# Patient Record
Sex: FEMALE | Race: WHITE | Employment: OTHER | URBAN - METROPOLITAN AREA
[De-identification: names, ages, dates, MRNs, and addresses within clinical notes are randomized per-mention and may not be internally consistent; named-entity substitution may affect disease eponyms.]

---

## 2019-02-25 ENCOUNTER — TRANSFERRED RECORDS (OUTPATIENT)
Dept: HEALTH INFORMATION MANAGEMENT | Facility: CLINIC | Age: 84
End: 2019-02-25

## 2019-03-06 ENCOUNTER — TRANSFERRED RECORDS (OUTPATIENT)
Dept: HEALTH INFORMATION MANAGEMENT | Facility: CLINIC | Age: 84
End: 2019-03-06

## 2019-08-23 ENCOUNTER — OFFICE VISIT (OUTPATIENT)
Dept: FAMILY MEDICINE | Facility: CLINIC | Age: 84
End: 2019-08-23
Payer: COMMERCIAL

## 2019-08-23 VITALS
TEMPERATURE: 98 F | DIASTOLIC BLOOD PRESSURE: 68 MMHG | RESPIRATION RATE: 16 BRPM | SYSTOLIC BLOOD PRESSURE: 106 MMHG | HEART RATE: 90 BPM | WEIGHT: 96 LBS | BODY MASS INDEX: 17.66 KG/M2 | HEIGHT: 62 IN

## 2019-08-23 DIAGNOSIS — I49.9 IRREGULAR HEART RATE: ICD-10-CM

## 2019-08-23 DIAGNOSIS — R94.31 ABNORMAL ELECTROCARDIOGRAM: ICD-10-CM

## 2019-08-23 DIAGNOSIS — E03.9 HYPOTHYROIDISM, UNSPECIFIED TYPE: Primary | ICD-10-CM

## 2019-08-23 DIAGNOSIS — E55.9 VITAMIN D DEFICIENCY: ICD-10-CM

## 2019-08-23 DIAGNOSIS — E44.0 MODERATE PROTEIN-CALORIE MALNUTRITION (H): ICD-10-CM

## 2019-08-23 PROBLEM — E46 PROTEIN-CALORIE MALNUTRITION (H): Status: ACTIVE | Noted: 2019-08-23

## 2019-08-23 LAB
ALBUMIN SERPL-MCNC: 3.6 G/DL (ref 3.4–5)
ALP SERPL-CCNC: 69 U/L (ref 40–150)
ALT SERPL W P-5'-P-CCNC: 37 U/L (ref 0–50)
ANION GAP SERPL CALCULATED.3IONS-SCNC: 4 MMOL/L (ref 3–14)
AST SERPL W P-5'-P-CCNC: 35 U/L (ref 0–45)
BILIRUB SERPL-MCNC: 0.4 MG/DL (ref 0.2–1.3)
BUN SERPL-MCNC: 22 MG/DL (ref 7–30)
CALCIUM SERPL-MCNC: 9.1 MG/DL (ref 8.5–10.1)
CHLORIDE SERPL-SCNC: 107 MMOL/L (ref 94–109)
CO2 SERPL-SCNC: 33 MMOL/L (ref 20–32)
CREAT SERPL-MCNC: 0.76 MG/DL (ref 0.52–1.04)
ERYTHROCYTE [DISTWIDTH] IN BLOOD BY AUTOMATED COUNT: 13.8 % (ref 10–15)
GFR SERPL CREATININE-BSD FRML MDRD: 72 ML/MIN/{1.73_M2}
GLUCOSE SERPL-MCNC: 86 MG/DL (ref 70–99)
HCT VFR BLD AUTO: 46.6 % (ref 35–47)
HGB BLD-MCNC: 15.7 G/DL (ref 11.7–15.7)
MCH RBC QN AUTO: 33.1 PG (ref 26.5–33)
MCHC RBC AUTO-ENTMCNC: 33.7 G/DL (ref 31.5–36.5)
MCV RBC AUTO: 98 FL (ref 78–100)
PLATELET # BLD AUTO: 162 10E9/L (ref 150–450)
POTASSIUM SERPL-SCNC: 4 MMOL/L (ref 3.4–5.3)
PROT SERPL-MCNC: 6.5 G/DL (ref 6.8–8.8)
RBC # BLD AUTO: 4.75 10E12/L (ref 3.8–5.2)
SODIUM SERPL-SCNC: 144 MMOL/L (ref 133–144)
T4 FREE SERPL-MCNC: 0.5 NG/DL (ref 0.76–1.46)
TSH SERPL DL<=0.005 MIU/L-ACNC: 4.68 MU/L (ref 0.4–4)
WBC # BLD AUTO: 5.5 10E9/L (ref 4–11)

## 2019-08-23 PROCEDURE — 80053 COMPREHEN METABOLIC PANEL: CPT | Performed by: PHYSICIAN ASSISTANT

## 2019-08-23 PROCEDURE — 99204 OFFICE O/P NEW MOD 45 MIN: CPT | Mod: 25 | Performed by: PHYSICIAN ASSISTANT

## 2019-08-23 PROCEDURE — 85027 COMPLETE CBC AUTOMATED: CPT | Performed by: PHYSICIAN ASSISTANT

## 2019-08-23 PROCEDURE — 36415 COLL VENOUS BLD VENIPUNCTURE: CPT | Performed by: PHYSICIAN ASSISTANT

## 2019-08-23 PROCEDURE — 84439 ASSAY OF FREE THYROXINE: CPT | Performed by: PHYSICIAN ASSISTANT

## 2019-08-23 PROCEDURE — 93000 ELECTROCARDIOGRAM COMPLETE: CPT | Performed by: PHYSICIAN ASSISTANT

## 2019-08-23 PROCEDURE — 84443 ASSAY THYROID STIM HORMONE: CPT | Performed by: PHYSICIAN ASSISTANT

## 2019-08-23 RX ORDER — TIMOLOL MALEATE 2.5 MG/ML
1 SOLUTION/ DROPS OPHTHALMIC 2 TIMES DAILY
COMMUNITY
End: 2019-11-28

## 2019-08-23 RX ORDER — LATANOPROST 50 UG/ML
1 SOLUTION/ DROPS OPHTHALMIC AT BEDTIME
COMMUNITY
End: 2021-07-01

## 2019-08-23 SDOH — HEALTH STABILITY: MENTAL HEALTH: HOW OFTEN DO YOU HAVE 6 OR MORE DRINKS ON ONE OCCASION?: NEVER

## 2019-08-23 SDOH — HEALTH STABILITY: MENTAL HEALTH: HOW OFTEN DO YOU HAVE A DRINK CONTAINING ALCOHOL?: 4 OR MORE TIMES A WEEK

## 2019-08-23 SDOH — HEALTH STABILITY: MENTAL HEALTH: HOW MANY STANDARD DRINKS CONTAINING ALCOHOL DO YOU HAVE ON A TYPICAL DAY?: 1 OR 2

## 2019-08-23 ASSESSMENT — ENCOUNTER SYMPTOMS
RESPIRATORY NEGATIVE: 1
NEUROLOGICAL NEGATIVE: 1
PSYCHIATRIC NEGATIVE: 1
ENDOCRINE NEGATIVE: 1
CONSTITUTIONAL NEGATIVE: 1
MUSCULOSKELETAL NEGATIVE: 1
GASTROINTESTINAL NEGATIVE: 1
EYES NEGATIVE: 1
CARDIOVASCULAR NEGATIVE: 1

## 2019-08-23 ASSESSMENT — MIFFLIN-ST. JEOR: SCORE: 838.7

## 2019-08-23 NOTE — PATIENT INSTRUCTIONS
1. We are going to check your thyroid function and decide if you need to take the thyroid medication.  Please follow up and let us know if you have been taking the levothyroxine.     2. I would like you to see the cardiologist for follow up on the irregular heart rate.  I suspect you may have atrial fibrillation, however you were not in atrial fibrillation when we completed the EKG.     3. I will let you know what your lab work shows when it is complete.     4. Come see me in 4 weeks for a recheck and we will check your weight at that visit.

## 2019-08-23 NOTE — LETTER
September 3, 2019      Frances Melvin [address] [address]        Dear ,    We are writing to inform you of your test results.    {results letter list:406109}    Resulted Orders   CBC with platelets   Result Value Ref Range    WBC 5.5 4.0 - 11.0 10e9/L    RBC Count 4.75 3.8 - 5.2 10e12/L    Hemoglobin 15.7 11.7 - 15.7 g/dL    Hematocrit 46.6 35.0 - 47.0 %    MCV 98 78 - 100 fl    MCH 33.1 (H) 26.5 - 33.0 pg    MCHC 33.7 31.5 - 36.5 g/dL    RDW 13.8 10.0 - 15.0 %    Platelet Count 162 150 - 450 10e9/L   Comprehensive metabolic panel (BMP + Alb, Alk Phos, ALT, AST, Total. Bili, TP)   Result Value Ref Range    Sodium 144 133 - 144 mmol/L    Potassium 4.0 3.4 - 5.3 mmol/L    Chloride 107 94 - 109 mmol/L    Carbon Dioxide 33 (H) 20 - 32 mmol/L    Anion Gap 4 3 - 14 mmol/L    Glucose 86 70 - 99 mg/dL    Urea Nitrogen 22 7 - 30 mg/dL    Creatinine 0.76 0.52 - 1.04 mg/dL    GFR Estimate 72 >60 mL/min/[1.73_m2]      Comment:      Non  GFR Calc  Starting 12/18/2018, serum creatinine based estimated GFR (eGFR) will be   calculated using the Chronic Kidney Disease Epidemiology Collaboration   (CKD-EPI) equation.      GFR Estimate If Black 84 >60 mL/min/[1.73_m2]      Comment:       GFR Calc  Starting 12/18/2018, serum creatinine based estimated GFR (eGFR) will be   calculated using the Chronic Kidney Disease Epidemiology Collaboration   (CKD-EPI) equation.      Calcium 9.1 8.5 - 10.1 mg/dL    Bilirubin Total 0.4 0.2 - 1.3 mg/dL    Albumin 3.6 3.4 - 5.0 g/dL    Protein Total 6.5 (L) 6.8 - 8.8 g/dL    Alkaline Phosphatase 69 40 - 150 U/L    ALT 37 0 - 50 U/L    AST 35 0 - 45 U/L   TSH with free T4 reflex   Result Value Ref Range    TSH 4.68 (H) 0.40 - 4.00 mU/L   T4 free   Result Value Ref Range    T4 Free 0.50 (L) 0.76 - 1.46 ng/dL       If you have any questions or concerns, please call the clinic at the number listed above.       Sincerely,        Catherine Urban  GRACIA Kohli

## 2019-08-23 NOTE — PROGRESS NOTES
"Subjective     Frances Alexander is a 84 year old female who presents to clinic today for the following health issues:    HPI   Establish Care      Duration: today    Description (location/character/radiation): needs form filled to enter assisted living    Intensity:  mild    Accompanying signs and symptoms: na    History (similar episodes/previous evaluation): None    Precipitating or alleviating factors: None    Therapies tried and outcome: None         Patient Active Problem List   Diagnosis     Protein-calorie malnutrition (H)     No past surgical history on file.    Social History     Tobacco Use     Smoking status: Never Smoker     Smokeless tobacco: Never Used   Substance Use Topics     Alcohol use: Yes     Alcohol/week: 0.6 oz     Types: 1 Glasses of wine per week     Frequency: 4 or more times a week     Drinks per session: 1 or 2     Binge frequency: Never     No family history on file.      Current Outpatient Medications   Medication Sig Dispense Refill     latanoprost (XALATAN) 0.005 % ophthalmic solution 1 drop daily       timolol maleate (TIMOPTIC) 0.25 % ophthalmic solution 1 drop 2 times daily       UNABLE TO FIND MEDICATION NAME: LThyroxine  0.1 mg daily       vitamin D3 (CHOLECALCIFEROL) 1000 units (25 mcg) tablet Take 1 tablet (1,000 Units) by mouth daily 90 tablet 3     No Known Allergies    Reviewed and updated as needed this visit by Provider         Review of Systems   Constitutional: Negative.    HENT: Negative.    Eyes: Negative.    Respiratory: Negative.    Cardiovascular: Negative.    Gastrointestinal: Negative.    Endocrine: Negative.    Genitourinary: Negative.    Musculoskeletal: Negative.    Skin: Negative.    Neurological: Negative.    Psychiatric/Behavioral: Negative.          Objective    /68 (Cuff Size: Adult Regular)   Pulse 90   Temp 98  F (36.7  C) (Tympanic)   Resp 16   Ht 1.575 m (5' 2\")   Wt 43.5 kg (96 lb)   BMI 17.56 kg/m    Physical Exam   Constitutional: She is " oriented to person, place, and time. She appears well-developed and well-nourished. No distress.   HENT:   Head: Normocephalic.   Right Ear: External ear normal.   Left Ear: External ear normal.   Nose: Nose normal.   Eyes: Conjunctivae and EOM are normal.   Neck: Normal range of motion.   Cardiovascular: S1 normal and S2 normal. An irregularly irregular rhythm present.   Pulmonary/Chest: Effort normal. She has wheezes.   Neurological: She is alert and oriented to person, place, and time.   Skin: She is not diaphoretic.   Psychiatric: She has a normal mood and affect. Judgment normal.       Diagnostic Test Results:  No results found for this or any previous visit (from the past 24 hour(s)).        Assessment & Plan   Problem List Items Addressed This Visit        Digestive    Protein-calorie malnutrition (H)    Relevant Orders    CBC with platelets      Other Visit Diagnoses     Hypothyroidism, unspecified type    -  Primary    Relevant Orders    TSH with free T4 reflex    Vitamin D deficiency        Relevant Medications    vitamin D3 (CHOLECALCIFEROL) 1000 units (25 mcg) tablet    Irregular heart rate        Relevant Orders    EKG 12-lead complete w/read - Clinics (Completed)    Abnormal electrocardiogram             Patient is unsure if she has been taking 0.1 mg levothyroxine daily, which has been prescribed in the past.   Today we will check her TSH reflex T4, and she will find out if the levothyroxine is in her medicine bag with her eye drops.   Weight is a concern - she is moving into assistant living and will have three meals a day there - we will recheck her weight in one month to make sure it is either increased or maintaining.   Atrial fibrillation suspected on physical exam, however was not confirmed with EKG - referral to cardiology for further evaluation.         Patient Instructions           Return in about 4 weeks (around 9/20/2019) for Weight Check, Medication Recheck.    Catherine Kohli,  GRACIA  [facility]

## 2019-08-27 ENCOUNTER — TELEPHONE (OUTPATIENT)
Dept: FAMILY MEDICINE | Facility: CLINIC | Age: 84
End: 2019-08-27

## 2019-08-27 DIAGNOSIS — H40.053 INCREASED PRESSURE IN THE EYE, BILATERAL: Primary | ICD-10-CM

## 2019-08-27 DIAGNOSIS — E03.9 HYPOTHYROIDISM, UNSPECIFIED TYPE: ICD-10-CM

## 2019-08-27 RX ORDER — LEVOTHYROXINE SODIUM 100 UG/1
100 TABLET ORAL DAILY
Qty: 90 TABLET | Refills: 1 | Status: SHIPPED | OUTPATIENT
Start: 2019-08-27 | End: 2019-10-09

## 2019-08-27 RX ORDER — BRIMONIDINE TARTRATE 2 MG/ML
1 SOLUTION/ DROPS OPHTHALMIC
Qty: 1 BOTTLE | Refills: 3 | Status: SHIPPED | OUTPATIENT
Start: 2019-08-27 | End: 2022-01-01

## 2019-08-27 NOTE — TELEPHONE ENCOUNTER
Eye drop ordered and levothyroxine ordered.   Patient should have thyroid recheck in 6-8 weeks.     Martín Kohli PA-C

## 2019-08-27 NOTE — TELEPHONE ENCOUNTER
Daughter in law Lalitha was called with provider's message. Patient has not been taking thyroid medication for 2-3 months for sure. She had a bottle of thyroid medication .1 mg prescribed by Dr. Francis in [place] hidden in [place] decorations. She could not find them so she stopped for a while.     She found another medication that is not on her medlist: brimonidine tartrate 0/2% op soln prescribed by Dr. Martinez, date filled March 21st 2019 and 3 refills until March 3rd 2020. The are working on getting pt an eye doctor. Lalitha thinks pt has glaucoma. Needs a doctor's Rx to use at [facility]. She is requesting an updated medlist with FINA Gonsalves signature on it faxed to: [phone]. Phone number: [phone].

## 2019-08-27 NOTE — TELEPHONE ENCOUNTER
Need to confirm:  1. Has she been taking levothyroxine?   2. What dose has she been taking?    Note: Patient with early signs of dementia, so we need to confirm with her son or daughter-in-law.     Martín Kohli PA-C

## 2019-08-27 NOTE — TELEPHONE ENCOUNTER
Our goal is to have forms completed within 72 hours, however some forms may require a visit or additional information.    What clinic location was the form placed at [facility] or [place]     Who is the form from?   Where did the form come from? Faxed to clinic   The form was placed in the inbox of FINA Roman      Please fax to [phone]  Phone number: [phone] nurse line    Additional comments: [facility] senior [facility] need provider sig on med list     Call take on 8/27/2019 at 2:50 PM by Rosita Harris

## 2019-08-30 DIAGNOSIS — E55.9 VITAMIN D DEFICIENCY: ICD-10-CM

## 2019-08-30 PROBLEM — I49.9 IRREGULAR HEART BEAT: Status: ACTIVE | Noted: 2019-08-30

## 2019-08-30 PROBLEM — E03.9 HYPOTHYROIDISM: Status: ACTIVE | Noted: 2019-08-30

## 2019-09-03 NOTE — PROGRESS NOTES
HPI and Plan:             CURRENT MEDICATIONS:  Current Outpatient Medications   Medication Sig Dispense Refill     brimonidine (ALPHAGAN) 0.2 % ophthalmic solution Place 1 drop into both eyes 2 times daily 1 Bottle 3     latanoprost (XALATAN) 0.005 % ophthalmic solution 1 drop daily       levothyroxine (SYNTHROID/LEVOTHROID) 100 MCG tablet Take 1 tablet (100 mcg) by mouth daily 90 tablet 1     timolol maleate (TIMOPTIC) 0.25 % ophthalmic solution 1 drop 2 times daily       vitamin D3 (CHOLECALCIFEROL) 1000 units (25 mcg) tablet Take 1 tablet (1,000 Units) by mouth daily 90 tablet 3       ALLERGIES   No Known Allergies    PAST MEDICAL HISTORY:  Past Medical History:   Diagnosis Date     Hypothyroidism 8/30/2019     Irregular heart beat 8/30/2019     Vitamin D deficiency 8/30/2019       PAST SURGICAL HISTORY:  History reviewed. No pertinent surgical history.    FAMILY HISTORY:  History reviewed. No pertinent family history.    SOCIAL HISTORY:  Social History     Socioeconomic History     Marital status:      Spouse name: None     Number of children: None     Years of education: None     Highest education level: None   Occupational History     None   Social Needs     Financial resource strain: None     Food insecurity:     Worry: None     Inability: None     Transportation needs:     Medical: None     Non-medical: None   Tobacco Use     Smoking status: Never Smoker     Smokeless tobacco: Never Used   Substance and Sexual Activity     Alcohol use: Yes     Alcohol/week: 0.6 oz     Types: 1 Glasses of wine per week     Frequency: 4 or more times a week     Drinks per session: 1 or 2     Binge frequency: Never     Drug use: Never     Sexual activity: Not Currently   Lifestyle     Physical activity:     Days per week: None     Minutes per session: None     Stress: None   Relationships     Social connections:     Talks on phone: None     Gets together: None     Attends Jew service: None     Active member of  "club or organization: None     Attends meetings of clubs or organizations: None     Relationship status: None     Intimate partner violence:     Fear of current or ex partner: None     Emotionally abused: None     Physically abused: None     Forced sexual activity: None   Other Topics Concern     None   Social History Narrative     None       Review of Systems:  Skin:          Eyes:  Positive for contacts    ENT:  Positive for hearing loss    Respiratory:  Negative       Cardiovascular:  Negative   does not have sx of irregular heart rythym  Gastroenterology: not assessed      Genitourinary:  not assessed      Musculoskeletal:  not assessed      Neurologic:  Negative      Psychiatric:  not assessed      Heme/Lymph/Imm:  Negative      Endocrine:  Positive for thyroid disorder      Physical Exam:  Vitals: /80   Pulse 104   Ht 1.575 m (5' 2\")   Wt 44.5 kg (98 lb)   BMI 17.92 kg/m      Constitutional:  cooperative, alert and oriented, well developed, well nourished, in no acute distress        Skin:  warm and dry to the touch, no apparent skin lesions or masses noted          Head:  normocephalic, no masses or lesions        Eyes:  pupils equal and round, conjunctivae and lids unremarkable, sclera white, no xanthalasma, EOMS intact, no nystagmus        Lymph:      ENT:           Neck:  carotid pulses are full and equal bilaterally, JVP normal, no carotid bruit        Respiratory:  normal breath sounds, clear to auscultation, normal A-P diameter, normal symmetry, normal respiratory excursion, no use of accessory muscles         Cardiac: regular rhythm, normal S1/S2, no S3 or S4, apical impulse not displaced, no murmurs, gallops or rubs                pulses full and equal, no bruits auscultated                                        GI:  abdomen soft, non-tender, BS normoactive, no mass, no HSM, no bruits        Extremities and Muscular Skeletal:  no deformities, clubbing, cyanosis, erythema observed          "     Neurological:           Psych:  Alert and Oriented x 3        CC  Catherine Kohli, GRACIA  [address] [place] [address]

## 2019-09-04 ENCOUNTER — HOSPITAL ENCOUNTER (OUTPATIENT)
Dept: CARDIOLOGY | Facility: CLINIC | Age: 84
Discharge: HOME | End: 2019-09-04
Attending: INTERNAL MEDICINE | Admitting: INTERNAL MEDICINE
Payer: COMMERCIAL

## 2019-09-04 ENCOUNTER — OFFICE VISIT (OUTPATIENT)
Dept: CARDIOLOGY | Facility: CLINIC | Age: 84
End: 2019-09-04
Attending: PHYSICIAN ASSISTANT
Payer: COMMERCIAL

## 2019-09-04 VITALS
WEIGHT: 98 LBS | HEIGHT: 62 IN | HEART RATE: 104 BPM | SYSTOLIC BLOOD PRESSURE: 137 MMHG | BODY MASS INDEX: 18.03 KG/M2 | DIASTOLIC BLOOD PRESSURE: 80 MMHG

## 2019-09-04 DIAGNOSIS — R00.2 PALPITATIONS: ICD-10-CM

## 2019-09-04 DIAGNOSIS — R00.2 PALPITATIONS: Primary | ICD-10-CM

## 2019-09-04 PROCEDURE — 99203 OFFICE O/P NEW LOW 30 MIN: CPT | Mod: 26 | Performed by: INTERNAL MEDICINE

## 2019-09-04 PROCEDURE — 93000 ELECTROCARDIOGRAM COMPLETE: CPT | Mod: 59 | Performed by: INTERNAL MEDICINE

## 2019-09-04 PROCEDURE — 93306 TTE W/DOPPLER COMPLETE: CPT

## 2019-09-04 PROCEDURE — 93306 TTE W/DOPPLER COMPLETE: CPT | Mod: 26 | Performed by: INTERNAL MEDICINE

## 2019-09-04 ASSESSMENT — MIFFLIN-ST. JEOR: SCORE: 847.78

## 2019-09-04 NOTE — LETTER
9/4/2019      Catherine Kohli PA-C  [address]  [facility] [address]      RE: Frances Alexander       Dear Colleague,    I had the pleasure of seeing Frances Alexander in the [facility].    Service Date: 09/04/2019      REASON FOR CONSULTATION:  Irregular heartbeat.        HISTORY OF PRESENT ILLNESS:  I had the pleasure of seeing Ms. Alexander in consultation at the [facility] today.  She is a very pleasant 84-year-old female with no known cardiac history who is referred today for concerns regarding possible atrial fibrillation upon evaluation by her primary care physician in late August of this year.      The patient herself is minimally symptomatic from a cardiac standpoint.  She does mention occasional palpitations and shortness of breath, but these have been longstanding issues that have not worsened recently.  She denies any chest discomfort, syncope or presyncope.  She states that she walks intermittently and has not noted any chest discomfort or shortness of breath while doing so.  She denies any syncope or presyncope.      As part of her evaluation, she underwent an EKG on 08/23/2019 which demonstrated normal sinus rhythm with left atrial enlargement.      Past medical history, family history, social history, allergies and medications are reviewed in my Epic note.      Physical exam is dictated below.      EKG today demonstrated normal sinus rhythm with nonspecific ST-T wave abnormalities.      IMPRESSION:   1.  Irregular heartbeat with concerns for atrial fibrillation.      Ms. Alexander presents for an evaluation regarding possible atrial fibrillation based on an irregular heart rhythm that was documented during physical exam.  Upon evaluation today, she does have occasional premature beats on auscultation but an EKG confirms normal sinus rhythm.      I think it would be reasonable to definitively exclude atrial fibrillation with a 7-day Zio Patch monitor,  as well as to obtain an echocardiogram given that she does complain of very episodic dyspnea.  Once these findings are available, we can decide if any further evaluation is necessary.      It was a pleasure seeing her in consultation today.         SHAHIDA CABRAL MD             D: 2019   T: 2019   MT: JEEVAN      Name:     JESS GRAY   MRN:      [id number]        Account:      [id number]   :      1935           Service Date: 2019      Document: [id number]         Outpatient Encounter Medications as of 2019   Medication Sig Dispense Refill     brimonidine (ALPHAGAN) 0.2 % ophthalmic solution Place 1 drop into both eyes 2 times daily 1 Bottle 3     latanoprost (XALATAN) 0.005 % ophthalmic solution 1 drop daily       levothyroxine (SYNTHROID/LEVOTHROID) 100 MCG tablet Take 1 tablet (100 mcg) by mouth daily 90 tablet 1     timolol maleate (TIMOPTIC) 0.25 % ophthalmic solution 1 drop 2 times daily       vitamin D3 (CHOLECALCIFEROL) 1000 units (25 mcg) tablet Take 1 tablet (1,000 Units) by mouth daily 90 tablet 3     No facility-administered encounter medications on file as of 2019.      Again, thank you for allowing me to participate in the care of your patient.      Sincerely,    Shahida Cabral MD     [facility]

## 2019-09-04 NOTE — PROGRESS NOTES
Service Date: 09/04/2019      REASON FOR CONSULTATION:  Irregular heartbeat.        HISTORY OF PRESENT ILLNESS:  I had the pleasure of seeing Ms. Alexander in consultation at the [facility] today.  She is a very pleasant 84-year-old female with no known cardiac history who is referred today for concerns regarding possible atrial fibrillation upon evaluation by her primary care physician in late August of this year.      The patient herself is minimally symptomatic from a cardiac standpoint.  She does mention occasional palpitations and shortness of breath, but these have been longstanding issues that have not worsened recently.  She denies any chest discomfort, syncope or presyncope.  She states that she walks intermittently and has not noted any chest discomfort or shortness of breath while doing so.  She denies any syncope or presyncope.      As part of her evaluation, she underwent an EKG on 08/23/2019 which demonstrated normal sinus rhythm with left atrial enlargement.      Past medical history, family history, social history, allergies and medications are reviewed in my Epic note.      Physical exam is dictated below.      EKG today demonstrated normal sinus rhythm with nonspecific ST-T wave abnormalities.      IMPRESSION:   1.  Irregular heartbeat with concerns for atrial fibrillation.      Ms. Alexander presents for an evaluation regarding possible atrial fibrillation based on an irregular heart rhythm that was documented during physical exam.  Upon evaluation today, she does have occasional premature beats on auscultation but an EKG confirms normal sinus rhythm.      I think it would be reasonable to definitively exclude atrial fibrillation with a 7-day Zio Patch monitor, as well as to obtain an echocardiogram given that she does complain of very episodic dyspnea.  Once these findings are available, we can decide if any further evaluation is necessary.      It was a pleasure seeing her in  consultation today.         RUFUS CABRAL MD             D: 2019   T: 2019   MT: JEEVAN      Name:     ISAAC   MRN:      [id number]        Account:      [id number]   :      1935           Service Date: 2019      Document: [id number]

## 2019-09-04 NOTE — LETTER
9/4/2019    Catherine Kohli PA-C  [address]  [facility] [address]    RE: Frances Benjamin       Dear Colleague,    I had the pleasure of seeing Frances Benjamin in the [facility].    HPI and Plan:             CURRENT MEDICATIONS:  Current Outpatient Medications   Medication Sig Dispense Refill     brimonidine (ALPHAGAN) 0.2 % ophthalmic solution Place 1 drop into both eyes 2 times daily 1 Bottle 3     latanoprost (XALATAN) 0.005 % ophthalmic solution 1 drop daily       levothyroxine (SYNTHROID/LEVOTHROID) 100 MCG tablet Take 1 tablet (100 mcg) by mouth daily 90 tablet 1     timolol maleate (TIMOPTIC) 0.25 % ophthalmic solution 1 drop 2 times daily       vitamin D3 (CHOLECALCIFEROL) 1000 units (25 mcg) tablet Take 1 tablet (1,000 Units) by mouth daily 90 tablet 3       ALLERGIES   No Known Allergies    PAST MEDICAL HISTORY:  Past Medical History:   Diagnosis Date     Hypothyroidism 8/30/2019     Irregular heart beat 8/30/2019     Vitamin D deficiency 8/30/2019       PAST SURGICAL HISTORY:  History reviewed. No pertinent surgical history.    FAMILY HISTORY:  History reviewed. No pertinent family history.    SOCIAL HISTORY:  Social History     Socioeconomic History     Marital status:      Spouse name: None     Number of children: None     Years of education: None     Highest education level: None   Occupational History     None   Social Needs     Financial resource strain: None     Food insecurity:     Worry: None     Inability: None     Transportation needs:     Medical: None     Non-medical: None   Tobacco Use     Smoking status: Never Smoker     Smokeless tobacco: Never Used   Substance and Sexual Activity     Alcohol use: Yes     Alcohol/week: 0.6 oz     Types: 1 Glasses of wine per week     Frequency: 4 or more times a week     Drinks per session: 1 or 2     Binge frequency: Never     Drug use: Never     Sexual activity: Not Currently   Lifestyle     Physical  "activity:     Days per week: None     Minutes per session: None     Stress: None   Relationships     Social connections:     Talks on phone: None     Gets together: None     Attends Muslim service: None     Active member of club or organization: None     Attends meetings of clubs or organizations: None     Relationship status: None     Intimate partner violence:     Fear of current or ex partner: None     Emotionally abused: None     Physically abused: None     Forced sexual activity: None   Other Topics Concern     None   Social History Narrative     None       Review of Systems:  Skin:          Eyes:  Positive for contacts    ENT:  Positive for hearing loss    Respiratory:  Negative       Cardiovascular:  Negative   does not have sx of irregular heart rythym  Gastroenterology: not assessed      Genitourinary:  not assessed      Musculoskeletal:  not assessed      Neurologic:  Negative      Psychiatric:  not assessed      Heme/Lymph/Imm:  Negative      Endocrine:  Positive for thyroid disorder      Physical Exam:  Vitals: /80   Pulse 104   Ht 1.575 m (5' 2\")   Wt 44.5 kg (98 lb)   BMI 17.92 kg/m       Constitutional:  cooperative, alert and oriented, well developed, well nourished, in no acute distress        Skin:  warm and dry to the touch, no apparent skin lesions or masses noted          Head:  normocephalic, no masses or lesions        Eyes:  pupils equal and round, conjunctivae and lids unremarkable, sclera white, no xanthalasma, EOMS intact, no nystagmus        Lymph:      ENT:           Neck:  carotid pulses are full and equal bilaterally, JVP normal, no carotid bruit        Respiratory:  normal breath sounds, clear to auscultation, normal A-P diameter, normal symmetry, normal respiratory excursion, no use of accessory muscles         Cardiac: regular rhythm, normal S1/S2, no S3 or S4, apical impulse not displaced, no murmurs, gallops or rubs                pulses full and equal, no bruits " auscultated                                        GI:  abdomen soft, non-tender, BS normoactive, no mass, no HSM, no bruits        Extremities and Muscular Skeletal:  no deformities, clubbing, cyanosis, erythema observed              Neurological:           Psych:  Alert and Oriented x 3        CC  Catherine Kohli PA-C  [address] [place] [address]                Thank you for allowing me to participate in the care of your patient.      Sincerely,     Shahida Friedman MD     [facility]    cc:   Catherine Kohli PA-C  [address] [place] [id number]

## 2019-09-05 ENCOUNTER — HOSPITAL ENCOUNTER (OUTPATIENT)
Dept: CARDIOLOGY | Facility: CLINIC | Age: 84
Discharge: HOME | End: 2019-09-05
Attending: INTERNAL MEDICINE | Admitting: INTERNAL MEDICINE
Payer: COMMERCIAL

## 2019-09-05 ENCOUNTER — DOCUMENTATION ONLY (OUTPATIENT)
Dept: CARDIOLOGY | Facility: CLINIC | Age: 84
End: 2019-09-05

## 2019-09-05 DIAGNOSIS — R00.2 PALPITATIONS: ICD-10-CM

## 2019-09-05 PROCEDURE — 0298T ZIO PATCH HOLTER ADULT PEDIATRIC GREATER THAN 48 HRS: CPT | Performed by: INTERNAL MEDICINE

## 2019-09-05 PROCEDURE — 0296T ZIO PATCH HOLTER ADULT PEDIATRIC GREATER THAN 48 HRS: CPT

## 2019-09-05 NOTE — PROGRESS NOTES
Shahida Friedman MD Anderson, Barbara E, RN 1 hour ago (9:41 AM)         No changes at this time thanks.          Quentin pending. Team 2 to call patient with results of echo & zio once all testing completed.

## 2019-09-05 NOTE — PROGRESS NOTES
Echo 9/4/19 noted. Ordered for follow up after NEW patient visit for c/o palpitations and possible atrial fibrillation.   ziopatch started same day - pending  Echo: 1. Normal left ventricular size with mildly reduced systolic function. Biplane LVEF 46%. Mild global hypokinesis of the left ventricle.  2. Septal motion suggests mild mechanical dyssynchrony but there is no apparent conduction abnormality on ECG.  3. Mild right ventricular size and systolic function.  4. Moderate bi-atrial enlargement.  5. Mild to moderate tricuspid regurgitation. The right ventricular systolic pressure is approximated at 25.0 mmHg plus the right atrial pressure.  There is no comparison study available.  /107  Will message Dr. Friedman to review

## 2019-09-09 ENCOUNTER — MYC MEDICAL ADVICE (OUTPATIENT)
Dept: FAMILY MEDICINE | Facility: CLINIC | Age: 84
End: 2019-09-09

## 2019-09-09 DIAGNOSIS — R19.02 ABDOMINAL MASS, LUQ (LEFT UPPER QUADRANT): Primary | ICD-10-CM

## 2019-09-17 ENCOUNTER — TRANSFERRED RECORDS (OUTPATIENT)
Dept: HEALTH INFORMATION MANAGEMENT | Facility: CLINIC | Age: 84
End: 2019-09-17

## 2019-09-19 ENCOUNTER — TELEPHONE (OUTPATIENT)
Dept: FAMILY MEDICINE | Facility: CLINIC | Age: 84
End: 2019-09-19

## 2019-09-20 ENCOUNTER — DOCUMENTATION ONLY (OUTPATIENT)
Dept: CARDIOLOGY | Facility: CLINIC | Age: 84
End: 2019-09-20

## 2019-09-20 ENCOUNTER — TELEPHONE (OUTPATIENT)
Dept: FAMILY MEDICINE | Facility: CLINIC | Age: 84
End: 2019-09-20

## 2019-09-20 DIAGNOSIS — I47.29 NSVT (NONSUSTAINED VENTRICULAR TACHYCARDIA) (H): Primary | ICD-10-CM

## 2019-09-20 DIAGNOSIS — I42.9 SECONDARY CARDIOMYOPATHY (H): ICD-10-CM

## 2019-09-20 NOTE — TELEPHONE ENCOUNTER
Our goal is to have forms completed within 72 hours, however some forms may require a visit or additional information.    What clinic location was the form placed at [facility] or [place].?     Who is the form from?   Where did the form come from? Faxed to clinic   The form was placed in the inbox of FINA Roman      Please fax to [phone]  Phone number: [phone]    Additional comments: karen HERCULES     Call take on 9/20/2019 at 10:18 AM by Rosita Harris

## 2019-09-20 NOTE — PROGRESS NOTES
xenia treviño 9/20/19 noted. Ordered for c/o palpitations.  xenia recorded NSR with 1 5-beat run of Vtach and 813 episodes of SVT up to 174 BPM and longest event at 16.9 sec.  Echo showed EF 46% and mild-mod TR.  Will message Dr. Friedman to review    1500 message from Dr. Friedman:  Given the mild left ventricular dysfunction and NSVT I think its reasonable to exclude ischemia with a stress nuclear study. Would prefer exercise but we can also do lexsican if necessary. Thanks.      Called patient's son, Frank, with POA. Son stage patient can be cooperative with her dementia is also likely to become stubborn and refuse directions. He notes patient has adopted a shuffling gait recently and is concerned about a falls risk if she tries the treadmill. Offered nuclear lexiscan as alternative. Son asks that we contact his wife, Elvira, as she does the appointment transport at this time.  Message to scheduling.

## 2019-09-20 NOTE — TELEPHONE ENCOUNTER
Given the mild left ventricular dysfunction and NSVT I think its reasonable to exclude ischemia with a stress nuclear study. Would prefer exercise but we can also do lexsican if necessary. Thanks.

## 2019-09-25 ENCOUNTER — DOCUMENTATION ONLY (OUTPATIENT)
Dept: OTHER | Facility: CLINIC | Age: 84
End: 2019-09-25

## 2019-09-27 ENCOUNTER — HOSPITAL ENCOUNTER (OUTPATIENT)
Dept: CARDIOLOGY | Facility: CLINIC | Age: 84
Discharge: HOME | End: 2019-09-27
Attending: INTERNAL MEDICINE | Admitting: INTERNAL MEDICINE
Payer: COMMERCIAL

## 2019-09-27 ENCOUNTER — TELEPHONE (OUTPATIENT)
Dept: CARDIOLOGY | Facility: CLINIC | Age: 84
End: 2019-09-27

## 2019-09-27 DIAGNOSIS — I47.29 NSVT (NONSUSTAINED VENTRICULAR TACHYCARDIA) (H): ICD-10-CM

## 2019-09-27 DIAGNOSIS — E03.9 HYPOTHYROIDISM, UNSPECIFIED TYPE: Primary | ICD-10-CM

## 2019-09-27 DIAGNOSIS — I42.9 SECONDARY CARDIOMYOPATHY (H): ICD-10-CM

## 2019-09-27 PROCEDURE — 34300033 ZZH RX 343: Performed by: INTERNAL MEDICINE

## 2019-09-27 PROCEDURE — 78452 HT MUSCLE IMAGE SPECT MULT: CPT

## 2019-09-27 PROCEDURE — A9502 TC99M TETROFOSMIN: HCPCS | Performed by: INTERNAL MEDICINE

## 2019-09-27 PROCEDURE — 78452 HT MUSCLE IMAGE SPECT MULT: CPT | Mod: 26 | Performed by: INTERNAL MEDICINE

## 2019-09-27 PROCEDURE — 25000128 H RX IP 250 OP 636: Performed by: INTERNAL MEDICINE

## 2019-09-27 PROCEDURE — 93018 CV STRESS TEST I&R ONLY: CPT | Performed by: INTERNAL MEDICINE

## 2019-09-27 PROCEDURE — 93016 CV STRESS TEST SUPVJ ONLY: CPT | Performed by: INTERNAL MEDICINE

## 2019-09-27 RX ORDER — AMINOPHYLLINE 25 MG/ML
50-100 INJECTION, SOLUTION INTRAVENOUS
Status: DISCONTINUED | OUTPATIENT
Start: 2019-09-27 | End: 2019-09-28 | Stop reason: HOSPADM

## 2019-09-27 RX ORDER — ALBUTEROL SULFATE 90 UG/1
2 AEROSOL, METERED RESPIRATORY (INHALATION) EVERY 5 MIN PRN
Status: DISCONTINUED | OUTPATIENT
Start: 2019-09-27 | End: 2019-09-28 | Stop reason: HOSPADM

## 2019-09-27 RX ORDER — REGADENOSON 0.08 MG/ML
0.4 INJECTION, SOLUTION INTRAVENOUS ONCE
Status: COMPLETED | OUTPATIENT
Start: 2019-09-27 | End: 2019-09-27

## 2019-09-27 RX ORDER — ACYCLOVIR 200 MG/1
0-1 CAPSULE ORAL
Status: DISCONTINUED | OUTPATIENT
Start: 2019-09-27 | End: 2019-09-28 | Stop reason: HOSPADM

## 2019-09-27 RX ADMIN — TETROFOSMIN 9.4 MCI.: 1.38 INJECTION, POWDER, LYOPHILIZED, FOR SOLUTION INTRAVENOUS at 10:08

## 2019-09-27 RX ADMIN — REGADENOSON 0.4 MG: 0.08 INJECTION, SOLUTION INTRAVENOUS at 10:05

## 2019-09-27 RX ADMIN — TETROFOSMIN 3 MCI.: 1.38 INJECTION, POWDER, LYOPHILIZED, FOR SOLUTION INTRAVENOUS at 08:37

## 2019-09-27 NOTE — TELEPHONE ENCOUNTER
Nuclear stress test done 9-27-19 - 1.  Myocardial perfusion imaging using single isotope technique  demonstrated normal tracer uptake at rest and stress without evidence  for ischemia or infarction.   2.  Gated images demonstrated mild global hypokinesis.  The left  ventricular systolic function is mildly reduced ejection fraction of  46%.    Pe Dr. Friedman's r note 9-20-19 - Given the mild left ventricular dysfunction and NSVT I think its reasonable to exclude ischemia with a stress nuclear study.    ZIO Patch and echo done already.     Will message Dr. Friedman.

## 2019-09-30 ENCOUNTER — TELEPHONE (OUTPATIENT)
Dept: CARDIOLOGY | Facility: CLINIC | Age: 84
End: 2019-09-30

## 2019-09-30 NOTE — TELEPHONE ENCOUNTER
Spoke with assisted living facility with Dr. Friedman's recommendation -   Given that her left ventricular systolic function is mildly reduced, I would recommend she start toprol XL 12.5 daily and get a repeat echo and DAISHA visit in 3  Months. I would just let her know  That this will hopefully suppress her premature beats and also potentially help her cardiac function. Thanks.               Assisted living requested signed order be faxed to [phone].

## 2019-10-07 ENCOUNTER — TELEPHONE (OUTPATIENT)
Dept: FAMILY MEDICINE | Facility: CLINIC | Age: 84
End: 2019-10-07

## 2019-10-07 NOTE — TELEPHONE ENCOUNTER
Our goal is to have forms completed within 72 hours, however some forms may require a visit or additional information.    What clinic location was the form placed at [facility] or [place].?     Who is the form from?   Where did the form come from? Faxed to clinic   The form was placed in the inbox of [employer] [facility]      Please fax to [phone]  Phone number: [phone]    Additional comments: pieterer incident of possible double dose of eye drops in last 7 days     Call take on 10/7/2019 at 10:21 AM by Rosita Harris

## 2019-10-08 DIAGNOSIS — E03.9 HYPOTHYROIDISM, UNSPECIFIED TYPE: ICD-10-CM

## 2019-10-08 PROCEDURE — 36415 COLL VENOUS BLD VENIPUNCTURE: CPT | Performed by: PHYSICIAN ASSISTANT

## 2019-10-08 PROCEDURE — 84439 ASSAY OF FREE THYROXINE: CPT | Performed by: PHYSICIAN ASSISTANT

## 2019-10-08 PROCEDURE — 84443 ASSAY THYROID STIM HORMONE: CPT | Performed by: PHYSICIAN ASSISTANT

## 2019-10-09 ENCOUNTER — MYC REFILL (OUTPATIENT)
Dept: FAMILY MEDICINE | Facility: CLINIC | Age: 84
End: 2019-10-09

## 2019-10-09 DIAGNOSIS — E03.9 HYPOTHYROIDISM, UNSPECIFIED TYPE: ICD-10-CM

## 2019-10-09 LAB
T4 FREE SERPL-MCNC: 1.86 NG/DL (ref 0.76–1.46)
TSH SERPL DL<=0.005 MIU/L-ACNC: 0.05 MU/L (ref 0.4–4)

## 2019-10-09 RX ORDER — LEVOTHYROXINE SODIUM 75 UG/1
75 TABLET ORAL DAILY
Qty: 60 TABLET | Refills: 0 | Status: SHIPPED | OUTPATIENT
Start: 2019-10-09 | End: 2019-11-05

## 2019-10-09 NOTE — RESULT ENCOUNTER NOTE
Frances and Family    Your lab tests are complete and I have reviewed the results.     - Your TSH was LOW and your T4 was HIGH, suggesting your thyroid medication dose may be too HIGH.  - I will send a new prescription to adjust the levothyroxine dose, please start the new dose when you pcik it up and discontinue the previous dose.   - We should recheck the thyroid in 6 weeks.     If you have any questions or concerns, please feel free to call or send a Livestream message.    Sincerely,  Martín Kohli PA-C

## 2019-10-10 NOTE — PROGRESS NOTES
Message sent to family via [employer] to schedule Lab only in 6 weeks.    Princess RENATO Martinez, [facility]

## 2019-10-11 RX ORDER — LEVOTHYROXINE SODIUM 75 UG/1
75 TABLET ORAL DAILY
Qty: 60 TABLET | Refills: 0 | OUTPATIENT
Start: 2019-10-11

## 2019-10-11 NOTE — TELEPHONE ENCOUNTER
"Requested Prescriptions   Pending Prescriptions Disp Refills     levothyroxine (SYNTHROID/LEVOTHROID) 75 MCG tablet  Last Written Prescription Date:  10/9/2019  Last Fill Quantity: 60 tablet,  # refills: 0   Last office visit: 8/23/2019 with prescribing provider:  Seun   Future Office Visit:     60 tablet 0     Sig: Take 1 tablet (75 mcg) by mouth daily       Thyroid Protocol Failed - 10/11/2019  2:58 PM        Failed - Normal TSH on file in past 12 months     Recent Labs   Lab Test 10/08/19  1006   TSH 0.05*              Passed - Patient is 12 years or older        Passed - Recent (12 mo) or future (30 days) visit within the authorizing provider's specialty     Patient has had an office visit with the authorizing provider or a provider within the authorizing providers department within the previous 12 mos or has a future within next 30 days. See \"Patient Info\" tab in inbasket, or \"Choose Columns\" in Meds & Orders section of the refill encounter.              Passed - Medication is active on med list        Passed - No active pregnancy on record     If patient is pregnant or has had a positive pregnancy test, please check TSH.          Passed - No positive pregnancy test in past 12 months     If patient is pregnant or has had a positive pregnancy test, please check TSH.             "

## 2019-10-18 ENCOUNTER — DOCUMENTATION ONLY (OUTPATIENT)
Dept: OTHER | Facility: CLINIC | Age: 84
End: 2019-10-18

## 2019-10-21 ENCOUNTER — TELEPHONE (OUTPATIENT)
Dept: FAMILY MEDICINE | Facility: CLINIC | Age: 84
End: 2019-10-21

## 2019-10-21 NOTE — TELEPHONE ENCOUNTER
Our goal is to have forms completed within 72 hours, however some forms may require a visit or additional information.    What clinic location was the form placed at [facility] or [place].?     Who is the form from?   Where did the form come from? Faxed to clinic   The form was placed in the inbox of [facility] [facility]      Please fax to [phone]  Phone number: [phone]    Additional comments: ebenzer patient attempted to elope the facility     Call take on 10/21/2019 at 3:30 PM by Rosita Harris

## 2019-10-22 NOTE — PROGRESS NOTES
"[facility]  PRIMARY CARE PROVIDER AND CLINIC:  Bing Jarvis, MARTY CNP, [address] [facility] [address] / [place] [address]  Chief Complaint   Patient presents with     [facility] Care     [place] Medical Record Number:  [id number]  Place of Service where encounter took place:  [facility] ([facility]) [[id number]]    Frances Alexander  is a 84 year old  (1935) female with a medical history of hypothyroid and cognitive impairment. She had been living in a house independently in [facility] up until August of this year when she moved to [place] where her son is, as it was noted her cognitive impairment had worsened. She moved to an assisted living in [place] initially and did not do well caring for herself and the facility recommended moving to a memory care, therefore, she moved to [facility]. She was followed by a provider at the [facility] clinic where she was noted to have palpitations and intermittent shortness of breath thus went to cardiology for evaluation. A ziopatch was placed and she was found to have NSVT and was started on metoprolol. History of abdominal mass per prior PCP so a CT scan was completed when she moved to [place] and resulted with large left liver lobe not concerning for malignancy.    Ms. Alexander was visited today while in the community area. We walked to a private area in the dining area to visit. She denies any pain. States that she has been eating but \"just eats slow\". She verbalizes sitting with 4 woman at the breakfast table this morning and that she came to the facility at 9 a.m today. She is bothered that her door remains unlocked during the night hours. States that her son and daughter in law live in [place]. She reports that she lived in [place] for 10 years.     I spoke with son, Frank today from 10:48-11:49 today. He reports that his father was from [place] but June never lived there. His " sister (Francess daughter), however, currently lives there. Frances lived in [place] independently for the past 14 years when family noted that she started loosing weight and had been missing meals. she moved to [place] to be closer to [place]. There are limited records to review from her prior PCP as they don't appear to participate in the care everywhere system. Frank states that Frances has a hx of breast cancer unknown treatment. He has noticed her weight loss but is hopeful she will has gained weight since being served three meals per day. He was unaware of the degree of the cognitive impairment until she moved to [place]. She has eloped two times since moving to the [facility] and per Frank because she does not believe that she needs to be in a secure unit or help with cares. We discussed the POLST form and he would like for Frances to be a DNR. He consents for any immunizations that need to be administered.     CODE STATUS/ADVANCE DIRECTIVES DISCUSSION:   DNR   Patient's living condition: lives in an assisted living facility  ALLERGIES: No known allergies  PAST MEDICAL HISTORY:  has a past medical history of Hypothyroidism (8/30/2019), Irregular heart beat (8/30/2019), and Vitamin D deficiency (8/30/2019).  PAST SURGICAL HISTORY:   has a past surgical history that includes Mastectomy (Right).  FAMILY HISTORY: family history is not on file.  SOCIAL HISTORY:   reports that she has never smoked. She has never used smokeless tobacco. She reports current alcohol use of about 1.0 standard drinks of alcohol per week. She reports that she does not use drugs.    Post Discharge Medication Reconciliation Status: discharge medications reconciled, continue medications without change    Current Outpatient Medications   Medication Sig Dispense Refill     brimonidine (ALPHAGAN) 0.2 % ophthalmic solution Place 1 drop into both eyes 2 times daily 1 Bottle 3     latanoprost (XALATAN) 0.005 % ophthalmic solution 1 drop daily        "levothyroxine (SYNTHROID/LEVOTHROID) 75 MCG tablet Take 1 tablet (75 mcg) by mouth daily 60 tablet 0     metoprolol succinate ER (TOPROL-XL) 25 MG 24 hr tablet Take 12.5 mg by mouth daily       timolol maleate (TIMOPTIC) 0.25 % ophthalmic solution 1 drop 2 times daily       vitamin D3 (CHOLECALCIFEROL) 1000 units (25 mcg) tablet Take 1 tablet (1,000 Units) by mouth daily 90 tablet 3     ROS:  Limited secondary to cognitive impairment but today pt reports no pain or shortness of breath    Vitals:  BP (!) 141/96   Pulse 68   Temp 97.2  F (36.2  C)   Resp 18   Ht 1.575 m (5' 2\")   Wt 43.5 kg (96 lb)   BMI 17.56 kg/m    Exam:  GENERAL APPEARANCE:  Alert, in no distress, pleasant, cooperative  EYES: no discharge or mattering on lids or lashes noted  ENT:  moist mucous membranes, hearing acuity intact  NECK: supple, symmetrical  RESP: no respiratory distress, Lung sounds clear, patient is on room air  CV:  rate and rhythm regular, no murmur. Edema none in bilateral lower extremities. VASCULAR: warm extremities without open areas.  ABDOMEN: normal bowel sounds, soft, nontender.  M/S:   Gait and station: ambulates independently without the use of an aid, no tenderness or swelling of the joints; able to move all extremities   SKIN:  Inspection and palpation of skin and subcutaneous tissue: skin warm, dry and intact without rashes  NEURO: no facial asymmetry, no speech deficits and able to follow directions, moves all extremities symmetrically  PSYCH:  insight, judgement, and memory impaired affect and mood normal    Lab/Diagnostic data:  CBC RESULTS:   Recent Labs   Lab Test 08/23/19  1532   WBC 5.5   RBC 4.75   HGB 15.7   HCT 46.6   MCV 98   MCH 33.1*   MCHC 33.7   RDW 13.8          Last Basic Metabolic Panel:  Recent Labs   Lab Test 08/23/19  1527      POTASSIUM 4.0   CHLORIDE 107   RAEANN 9.1   CO2 33*   BUN 22   CR 0.76   GLC 86       Liver Function Studies -   Recent Labs   Lab Test 08/23/19  1527 "   PROTTOTAL 6.5*   ALBUMIN 3.6   BILITOTAL 0.4   ALKPHOS 69   AST 35   ALT 37       TSH   Date Value Ref Range Status   10/08/2019 0.05 (L) 0.40 - 4.00 mU/L Final   08/23/2019 4.68 (H) 0.40 - 4.00 mU/L Final     9/27/19 lexiscan  Impression  1.  Myocardial perfusion imaging using single isotope technique  demonstrated normal tracer uptake at rest and stress without evidence  for ischemia or infarction.   2.  Gated images demonstrated mild global hypokinesis.  The left  ventricular systolic function is mildly reduced ejection fraction of  46%.    ASSESSMENT/PLAN:  Systolic heart failure  Nonsustained ventricular tachycardia  Recent lexiscan and echo with EF of 46%. No longer having palpitations or episodes of shortness of breath since starting on the metoprolol. HR today was 84. euvolemic on exam.  --continue with metoprolol XL 12.5 mg daily  --monitor BP and HR and adjust as needed.   --follow up with cardiology in December with repeat echo.     Protein calorie malnutrition  Weight loss  Assisted living admission weight was 96 lbs and weight today of 101 lb. Will likely see a further increase of weight as she is getting three scheduled meals in the assisted living.   --start magic cup daily  --monitor weights monthly    Dementia  [facility] 18/30. As noted above, had weight loss while living independently. Sleeping well and able to complete cares independently. She has eloped twice but she is redirectable, calm and pleasant today so there are no medications that are indicated at this time.   --continue with memory care assisted living for assistance with cares, meals and medications.   --redirect as needed.     Hypothyroid  TSH 0.05 on 10/8/19  --continue with levothyroxine 75 mcg per day  --recheck TSH in 6 weeks    Glaucoma  --continue with brimonidine 1 gtt both eyes BID  --latanoprost 1 gtt daily  --timolol 1 gtt BID  --establish care with opthamologist if needed    Enlarged lobe of liver  Recent CT scan showed  enlarge left liver lobe, not concerning for malignancy so no further follow up was recommended at this time.     Hx of breast cancer  Per son report. Treatment of this is unkown    Total time spent with patient visit at the skilled nursing facility was 85 minutes including patient visit, review of past records and phone call to patient contact. Greater than 50% of total time spent with counseling and coordinating care due to NVST, heart failure, dementia, malnutrition    Electronically signed by:  MARTY Oviedo CNP

## 2019-10-23 ENCOUNTER — ASSISTED LIVING VISIT (OUTPATIENT)
Dept: GERIATRICS | Facility: CLINIC | Age: 84
End: 2019-10-23
Payer: COMMERCIAL

## 2019-10-23 VITALS
DIASTOLIC BLOOD PRESSURE: 96 MMHG | RESPIRATION RATE: 18 BRPM | BODY MASS INDEX: 17.66 KG/M2 | TEMPERATURE: 97.2 F | WEIGHT: 96 LBS | HEIGHT: 62 IN | SYSTOLIC BLOOD PRESSURE: 141 MMHG | HEART RATE: 68 BPM

## 2019-10-23 DIAGNOSIS — Z76.89 ENCOUNTER TO ESTABLISH CARE: Primary | ICD-10-CM

## 2019-10-23 DIAGNOSIS — Z71.89 ADVANCED CARE PLANNING/COUNSELING DISCUSSION: ICD-10-CM

## 2019-10-23 DIAGNOSIS — I47.29 NONSUSTAINED VENTRICULAR TACHYCARDIA (H): ICD-10-CM

## 2019-10-23 DIAGNOSIS — I50.22 CHRONIC SYSTOLIC HEART FAILURE (H): ICD-10-CM

## 2019-10-23 DIAGNOSIS — H40.003 GLAUCOMA SUSPECT, BILATERAL: ICD-10-CM

## 2019-10-23 DIAGNOSIS — E03.9 HYPOTHYROIDISM, UNSPECIFIED TYPE: ICD-10-CM

## 2019-10-23 DIAGNOSIS — E44.0 MODERATE PROTEIN-CALORIE MALNUTRITION (H): ICD-10-CM

## 2019-10-23 DIAGNOSIS — R63.4 WEIGHT LOSS: ICD-10-CM

## 2019-10-23 DIAGNOSIS — Z85.3 PERSONAL HISTORY OF MALIGNANT NEOPLASM OF BREAST: ICD-10-CM

## 2019-10-23 DIAGNOSIS — F03.90 DEMENTIA WITHOUT BEHAVIORAL DISTURBANCE, UNSPECIFIED DEMENTIA TYPE: ICD-10-CM

## 2019-10-23 PROCEDURE — 99358 PROLONG SERVICE W/O CONTACT: CPT | Performed by: NURSE PRACTITIONER

## 2019-10-23 RX ORDER — METOPROLOL SUCCINATE 25 MG/1
12.5 TABLET, EXTENDED RELEASE ORAL DAILY
COMMUNITY
End: 2019-11-05

## 2019-10-23 ASSESSMENT — MIFFLIN-ST. JEOR: SCORE: 838.7

## 2019-10-23 NOTE — LETTER
"    10/23/2019        RE: Frances Alexander  [facility]  [address]  [place] [address]        [place] [facility]  PRIMARY CARE PROVIDER AND CLINIC:  MARTY Oviedo CNP, [address] [place] [address] / [place] [address]  Chief Complaint   Patient presents with     [facility] Care     [place] Medical Record Number:  [id number]  Place of Service where encounter took place:  [facility] ([facility]) [[id number]]    Frances Alexander  is a 84 year old  (1935) female with a medical history of hypothyroid and cognitive impairment. She had been living in a house independently in [facility] up until August of this year when she moved to [place] where her son is, as it was noted her cognitive impairment had worsened. She moved to an assisted living in [place] initially and did not do well caring for herself and the facility recommended moving to a memory care, therefore, she moved to [facility] memory care. She was followed by a provider at the [facility] clinic where she was noted to have palpitations and intermittent shortness of breath thus went to cardiology for evaluation. A ziopatch was placed and she was found to have NSVT and was started on metoprolol. History of abdominal mass per prior PCP so a CT scan was completed when she moved to [place] and resulted with large left liver lobe not concerning for malignancy.    Ms. Alexander was visited today while in the community area. We walked to a private area in the dining area to visit. She denies any pain. States that she has been eating but \"just eats slow\". She verbalizes sitting with 4 woman at the breakfast table this morning and that she came to the facility at 9 a.m today. She is bothered that her door remains unlocked during the night hours. States that her son and daughter in law live in [place]. She reports that she lived in [place] for 10 years.     I spoke with son, Frank " today from 10:48-11:49 today. He reports that his father was from [place] but Frances never lived there. His sister (Francess daughter), however, currently lives there. Frances lived in [place] independently for the past 14 years when family noted that she started loosing weight and had been missing meals. she moved to [place] to be closer to Frank. There are limited records to review from her prior PCP as they don't appear to participate in the care everywhere system. Frank states that Frances has a hx of breast cancer unknown treatment. He has noticed her weight loss but is hopeful she will has gained weight since being served three meals per day. He was unaware of the degree of the cognitive impairment until she moved to [place]. She has eloped two times since moving to the [facility] and per Frank because she does not believe that she needs to be in a secure unit or help with cares. We discussed the POLST form and he would like for Frances to be a DNR. He consents for any immunizations that need to be administered.     CODE STATUS/ADVANCE DIRECTIVES DISCUSSION:   DNR   Patient's living condition: lives in an assisted living facility  ALLERGIES: No known allergies  PAST MEDICAL HISTORY:  has a past medical history of Hypothyroidism (8/30/2019), Irregular heart beat (8/30/2019), and Vitamin D deficiency (8/30/2019).  PAST SURGICAL HISTORY:   has a past surgical history that includes Mastectomy (Right).  FAMILY HISTORY: family history is not on file.  SOCIAL HISTORY:   reports that she has never smoked. She has never used smokeless tobacco. She reports current alcohol use of about 1.0 standard drinks of alcohol per week. She reports that she does not use drugs.    Post Discharge Medication Reconciliation Status: discharge medications reconciled, continue medications without change    Current Outpatient Medications   Medication Sig Dispense Refill     brimonidine (ALPHAGAN) 0.2 % ophthalmic solution Place 1 drop into both  "eyes 2 times daily 1 Bottle 3     latanoprost (XALATAN) 0.005 % ophthalmic solution 1 drop daily       levothyroxine (SYNTHROID/LEVOTHROID) 75 MCG tablet Take 1 tablet (75 mcg) by mouth daily 60 tablet 0     metoprolol succinate ER (TOPROL-XL) 25 MG 24 hr tablet Take 12.5 mg by mouth daily       timolol maleate (TIMOPTIC) 0.25 % ophthalmic solution 1 drop 2 times daily       vitamin D3 (CHOLECALCIFEROL) 1000 units (25 mcg) tablet Take 1 tablet (1,000 Units) by mouth daily 90 tablet 3     ROS:  Limited secondary to cognitive impairment but today pt reports no pain or shortness of breath    Vitals:  BP (!) 141/96   Pulse 68   Temp 97.2  F (36.2  C)   Resp 18   Ht 1.575 m (5' 2\")   Wt 43.5 kg (96 lb)   BMI 17.56 kg/m     Exam:  GENERAL APPEARANCE:  Alert, in no distress, pleasant, cooperative  EYES: no discharge or mattering on lids or lashes noted  ENT:  moist mucous membranes, hearing acuity intact  NECK: supple, symmetrical  RESP: no respiratory distress, Lung sounds clear, patient is on room air  CV:  rate and rhythm regular, no murmur. Edema none in bilateral lower extremities. VASCULAR: warm extremities without open areas.  ABDOMEN: normal bowel sounds, soft, nontender.  M/S:   Gait and station: ambulates independently without the use of an aid, no tenderness or swelling of the joints; able to move all extremities   SKIN:  Inspection and palpation of skin and subcutaneous tissue: skin warm, dry and intact without rashes  NEURO: no facial asymmetry, no speech deficits and able to follow directions, moves all extremities symmetrically  PSYCH:  insight, judgement, and memory impaired affect and mood normal    Lab/Diagnostic data:  CBC RESULTS:   Recent Labs   Lab Test 08/23/19  1532   WBC 5.5   RBC 4.75   HGB 15.7   HCT 46.6   MCV 98   MCH 33.1*   MCHC 33.7   RDW 13.8          Last Basic Metabolic Panel:  Recent Labs   Lab Test 08/23/19  1527      POTASSIUM 4.0   CHLORIDE 107   RAEANN 9.1   CO2 33* "   BUN 22   CR 0.76   GLC 86       Liver Function Studies -   Recent Labs   Lab Test 08/23/19  1527   PROTTOTAL 6.5*   ALBUMIN 3.6   BILITOTAL 0.4   ALKPHOS 69   AST 35   ALT 37       TSH   Date Value Ref Range Status   10/08/2019 0.05 (L) 0.40 - 4.00 mU/L Final   08/23/2019 4.68 (H) 0.40 - 4.00 mU/L Final     9/27/19 lexiscan  Impression  1.  Myocardial perfusion imaging using single isotope technique  demonstrated normal tracer uptake at rest and stress without evidence  for ischemia or infarction.   2.  Gated images demonstrated mild global hypokinesis.  The left  ventricular systolic function is mildly reduced ejection fraction of  46%.    ASSESSMENT/PLAN:  Systolic heart failure  Nonsustained ventricular tachycardia  Recent lexiscan and echo with EF of 46%. No longer having palpitations or episodes of shortness of breath since starting on the metoprolol. HR today was 84. euvolemic on exam.  --continue with metoprolol XL 12.5 mg daily  --monitor BP and HR and adjust as needed.   --follow up with cardiology in December with repeat echo.     Protein calorie malnutrition  Weight loss  Assisted living admission weight was 96 lbs and weight today of 101 lb. Will likely see a further increase of weight as she is getting three scheduled meals in the assisted living.   --start magic cup daily  --monitor weights monthly    Dementia  [facility] 18/30. As noted above, had weight loss while living independently. Sleeping well and able to complete cares independently. She has eloped twice but she is redirectable, calm and pleasant today so there are no medications that are indicated at this time.   --continue with memory care assisted living for assistance with cares, meals and medications.   --redirect as needed.     Hypothyroid  TSH 0.05 on 10/8/19  --continue with levothyroxine 75 mcg per day  --recheck TSH in 6 weeks    Glaucoma  --continue with brimonidine 1 gtt both eyes BID  --latanoprost 1 gtt daily  --timolol 1 gtt  BID  --establish care with opthamologist if needed    Enlarged lobe of liver  Recent CT scan showed enlarge left liver lobe, not concerning for malignancy so no further follow up was recommended at this time.     Hx of breast cancer  Per son report. Treatment of this is unkown    Total time spent with patient visit at the [facility] nursing [facility] was 85 minutes including patient visit, review of past records and phone call to patient contact. Greater than 50% of total time spent with counseling and coordinating care due to NVST, heart failure, dementia, malnutrition    Electronically signed by:  MARTY Oviedo CNP                       Sincerely,        MARTY Oviedo CNP

## 2019-11-04 ENCOUNTER — TELEPHONE (OUTPATIENT)
Dept: CARDIOLOGY | Facility: CLINIC | Age: 84
End: 2019-11-04

## 2019-11-04 NOTE — TELEPHONE ENCOUNTER
Call from patient's son, Frank, wondering if his appointment on 11/19/19 could be a virtual visit/phone call.     Returned Frank's call, reviewed that unfortunately the clinic does not offer virtual/phone visits. Reviewed reason for appointment is to follow up on testing and see how patient is doing after medication change. Frank verbalized understanding, will keep appointment for 11/19/19.

## 2019-11-05 DIAGNOSIS — I47.29 NONSUSTAINED VENTRICULAR TACHYCARDIA (H): Primary | ICD-10-CM

## 2019-11-05 DIAGNOSIS — E03.9 HYPOTHYROIDISM, UNSPECIFIED TYPE: ICD-10-CM

## 2019-11-07 RX ORDER — LEVOTHYROXINE SODIUM 75 UG/1
75 TABLET ORAL DAILY
Qty: 31 TABLET | Refills: 3 | Status: SHIPPED | OUTPATIENT
Start: 2019-11-07 | End: 2020-11-11

## 2019-11-07 RX ORDER — METOPROLOL SUCCINATE 25 MG/1
12.5 TABLET, EXTENDED RELEASE ORAL DAILY
Qty: 16 TABLET | Refills: 11 | Status: SHIPPED | OUTPATIENT
Start: 2019-11-07 | End: 2019-11-19

## 2019-11-19 ENCOUNTER — OFFICE VISIT (OUTPATIENT)
Dept: CARDIOLOGY | Facility: CLINIC | Age: 84
End: 2019-11-19
Payer: COMMERCIAL

## 2019-11-19 VITALS
BODY MASS INDEX: 18.58 KG/M2 | WEIGHT: 101 LBS | HEART RATE: 88 BPM | SYSTOLIC BLOOD PRESSURE: 138 MMHG | HEIGHT: 62 IN | DIASTOLIC BLOOD PRESSURE: 88 MMHG

## 2019-11-19 DIAGNOSIS — I47.29 NONSUSTAINED VENTRICULAR TACHYCARDIA (H): ICD-10-CM

## 2019-11-19 DIAGNOSIS — I47.10 PAROXYSMAL SUPRAVENTRICULAR TACHYCARDIA (H): Primary | ICD-10-CM

## 2019-11-19 DIAGNOSIS — I42.8 OTHER CARDIOMYOPATHY (H): ICD-10-CM

## 2019-11-19 PROCEDURE — 99214 OFFICE O/P EST MOD 30 MIN: CPT | Performed by: PHYSICIAN ASSISTANT

## 2019-11-19 RX ORDER — METOPROLOL SUCCINATE 25 MG/1
25 TABLET, EXTENDED RELEASE ORAL DAILY
Qty: 16 TABLET | Refills: 11 | COMMUNITY
Start: 2019-11-19 | End: 2019-11-30

## 2019-11-19 ASSESSMENT — MIFFLIN-ST. JEOR: SCORE: 861.38

## 2019-11-19 NOTE — LETTER
11/19/2019    Bing Jarvis, APRN CNP  [address]  [facility] [address]    RE: Frances Alexander       Dear Colleague,    I had the pleasure of seeing Frances Alexander in the [facility].      Cardiology Clinic Progress Note    Frances Alexander MRN# [id number]   YOB: 1935 Age: 84 year old   Primary cardiologist: Dr. Friedman         Assessment and Plan:     In summary, Frances Alexander presents today for reassessment after recent testing.     1. Mild cardiomyopathy of unclear etiology, LVEF 45%   - Lexiscan negative for ischemia   - Appears euvolemic, denies symptoms of CHF  2. SVT and rare NSVT, asymptomatic, now on low-dose Toprol   - Still having short runs on exam  3. Mild-mod TR, mild PHTN  4. Hypertension - mildly uncontrolled in clinic  5. Hyperthyroid - Synthroid currently being adjusted by Bing Jarvis, possibly contributing to arrhythmias    Plan:  - Increase Toprol to 25 mg daily.   - RN to call facility for update on HR/BP in two weeks.  - Repeat ECHO, f/u with Dr. Friedman in 6 months.         History of Presenting Illness:      Frances Alexander is a pleasant 84 year old patient who presents today for reassessment after recent testing.     Frances was referred to Dr. Friedman due to concerns over irregular heartbeat noted by PCP. A 7day zio monitor was performed and showed 813 runs of SVT, up to 17 seconds duration, at 120-170 bpm, as well as one 5-beat run of NSVT. ECHO showed a mildly reduced LVEF ~45%. The IVC appeared normal. A Lexiscan stress test was performed and negative for ischemia. Low-dose Toprol therapy was initiated.   Of note, her TSH was quite low recently and her synthroid is being adjusted by her facility provider.     Today, Frances presents with her son. The patient herself is minimally symptomatic from a cardiac standpoint.  She does mention occasional palpitations and shortness of breath, but these have been longstanding issues that have not worsened recently.  She denies  "any chest discomfort, syncope or presyncope.  She states that she walks intermittently and has not noted any chest discomfort or shortness of breath while doing so.  She denies any syncope or presyncope. She states she's tolerating the low dose Toprol without fatigue or lightheadedness. Her medications are administered by her facility RN's. She's highly frustrated by this and spends the majority of the visit discussing this.         Review of Systems:     12-pt ROS is negative except for as noted in the HPI.          Physical Exam:     Vitals: /88   Pulse 88   Ht 1.575 m (5' 2\")   Wt 45.8 kg (101 lb)   BMI 18.47 kg/m     Wt Readings from Last 10 Encounters:   11/19/19 45.8 kg (101 lb)   10/23/19 43.5 kg (96 lb)   09/04/19 44.5 kg (98 lb)   08/23/19 43.5 kg (96 lb)       Constitutional:  Patient is pleasant, alert, cooperative, and in NAD.  HEENT:  NCAT. PERRLA. EOM's intact.   Neck:  CVP appears normal. No carotid bruits.   Pulmonary: Normal respiratory effort. CTAB.   Cardiac: RRR, with intermittent runs of rapid regular beats, normal S1/S2, no S3/S4, no murmur or rub.   Abdomen:  Non-tender abdomen, no hepatosplenomegaly appreciated.   Vascular: Pulses in the upper and lower extremities are 2+ and equal bilaterally.  Extremities: No edema, erythema, cyanosis or tenderness appreciated.  Skin:  No rashes or lesions appreciated.   Neurological:  No gross motor or sensory deficits.   Psych: Appropriate affect.        Data:   Labs reviewed:  Recent Labs   Lab Test 10/08/19  1006 08/23/19  1527   TSH 0.05* 4.68*       Lab Results   Component Value Date    WBC 5.5 08/23/2019    RBC 4.75 08/23/2019    HGB 15.7 08/23/2019    HCT 46.6 08/23/2019    MCV 98 08/23/2019    MCH 33.1 (H) 08/23/2019    MCHC 33.7 08/23/2019    RDW 13.8 08/23/2019     08/23/2019       Lab Results   Component Value Date     08/23/2019    POTASSIUM 4.0 08/23/2019    CHLORIDE 107 08/23/2019    CO2 33 (H) 08/23/2019    ANIONGAP 4 " 08/23/2019    GLC 86 08/23/2019    BUN 22 08/23/2019    CR 0.76 08/23/2019    GFRESTIMATED 72 08/23/2019    GFRESTBLACK 84 08/23/2019    RAEANN 9.1 08/23/2019      Lab Results   Component Value Date    AST 35 08/23/2019    ALT 37 08/23/2019       No results found for: A1C    No results found for: INR        Problem List:     Patient Active Problem List   Diagnosis     Protein-calorie malnutrition (H)     Vitamin D deficiency     Hypothyroidism     Irregular heart beat     NSVT (nonsustained ventricular tachycardia) (H)     Cardiomyopathy (H)           Medications:     Current Outpatient Medications   Medication Sig Dispense Refill     brimonidine (ALPHAGAN) 0.2 % ophthalmic solution Place 1 drop into both eyes 2 times daily 1 Bottle 3     dorzolamide-timolol PF (COSOPT) 22.3-6.8 MG/ML opthalmic solution 1 drop 2 times daily       latanoprost (XALATAN) 0.005 % ophthalmic solution 1 drop daily       levothyroxine (SYNTHROID/LEVOTHROID) 75 MCG tablet Take 1 tablet (75 mcg) by mouth daily 31 tablet 3     metoprolol succinate ER (TOPROL-XL) 25 MG 24 hr tablet Take 0.5 tablets (12.5 mg) by mouth daily 16 tablet 11     Nutritional Supplements (NUTRITIONAL SUPPLEMENT PO) Take by mouth daily       polyethylene glycol-propylene glycol (SYSTANE ULTRA) 0.4-0.3 % SOLN ophthalmic solution Place 1 drop into both eyes 2 times daily as needed for dry eyes       vitamin D3 (CHOLECALCIFEROL) 1000 units (25 mcg) tablet Take 1 tablet (1,000 Units) by mouth daily 90 tablet 3     timolol maleate (TIMOPTIC) 0.25 % ophthalmic solution 1 drop 2 times daily             Past Medical History:     Past Medical History:   Diagnosis Date     Cardiomyopathy (H)     mild, EF 46%      Hypothyroidism 8/30/2019     Irregular heart beat 8/30/2019     NSVT (nonsustained ventricular tachycardia) (H)      Vitamin D deficiency 8/30/2019     Past Surgical History:   Procedure Laterality Date     MASTECTOMY Right      History reviewed. No pertinent family  history.  Social History     Socioeconomic History     Marital status:      Spouse name: Not on file     Number of children: Not on file     Years of education: Not on file     Highest education level: Not on file   Occupational History     Not on file   Social Needs     Financial resource strain: Not on file     Food insecurity:     Worry: Not on file     Inability: Not on file     Transportation needs:     Medical: Not on file     Non-medical: Not on file   Tobacco Use     Smoking status: Never Smoker     Smokeless tobacco: Never Used   Substance and Sexual Activity     Alcohol use: Yes     Alcohol/week: 1.0 standard drinks     Types: 1 Glasses of wine per week     Frequency: 4 or more times a week     Drinks per session: 1 or 2     Binge frequency: Never     Drug use: Never     Sexual activity: Not Currently   Lifestyle     Physical activity:     Days per week: Not on file     Minutes per session: Not on file     Stress: Not on file   Relationships     Social connections:     Talks on phone: Not on file     Gets together: Not on file     Attends Latter-day service: Not on file     Active member of club or organization: Not on file     Attends meetings of clubs or organizations: Not on file     Relationship status: Not on file     Intimate partner violence:     Fear of current or ex partner: Not on file     Emotionally abused: Not on file     Physically abused: Not on file     Forced sexual activity: Not on file   Other Topics Concern     Parent/sibling w/ CABG, MI or angioplasty before 65F 55M? Not Asked   Social History Narrative     Not on file           Allergies:   No known allergies      Charity Acosta PA-C, PA-C  [facility]  Pager: [phone]    Thank you for allowing me to participate in the care of your patient.    Sincerely,     Charity Acosta PA-C     [facility]

## 2019-11-19 NOTE — PROGRESS NOTES
Cardiology Clinic Progress Note    Frances Alexander MRN# [id number]   YOB: 1935 Age: 84 year old   Primary cardiologist: Dr. Friedman         Assessment and Plan:     In summary, Frances Alexander presents today for reassessment after recent testing.     1. Mild cardiomyopathy of unclear etiology, LVEF 45%   - Lexiscan negative for ischemia   - Appears euvolemic, denies symptoms of CHF  2. SVT and rare NSVT, asymptomatic, now on low-dose Toprol   - Still having short runs on exam  3. Mild-mod TR, mild PHTN  4. Hypertension - mildly uncontrolled in clinic  5. Hyperthyroid - Synthroid currently being adjusted by Bing Jarvis, possibly contributing to arrhythmias    Plan:  - Increase Toprol to 25 mg daily.   - RN to call facility for update on HR/BP in two weeks.  - Repeat ECHO, f/u with Dr. Friedman in 6 months.         History of Presenting Illness:      Frances Alexander is a pleasant 84 year old patient who presents today for reassessment after recent testing.     Frances was referred to Dr. Friedman due to concerns over irregular heartbeat noted by PCP. A 7day zio monitor was performed and showed 813 runs of SVT, up to 17 seconds duration, at 120-170 bpm, as well as one 5-beat run of NSVT. ECHO showed a mildly reduced LVEF ~45%. The IVC appeared normal. A Lexiscan stress test was performed and negative for ischemia. Low-dose Toprol therapy was initiated.   Of note, her TSH was quite low recently and her synthroid is being adjusted by her facility provider.     Today, Frances presents with her son. The patient herself is minimally symptomatic from a cardiac standpoint.  She does mention occasional palpitations and shortness of breath, but these have been longstanding issues that have not worsened recently.  She denies any chest discomfort, syncope or presyncope.  She states that she walks intermittently and has not noted any chest discomfort or shortness of breath while doing so.  She denies any syncope or presyncope. She states  "she's tolerating the low dose Toprol without fatigue or lightheadedness. Her medications are administered by her facility RN's. She's highly frustrated by this and spends the majority of the visit discussing this.         Review of Systems:     12-pt ROS is negative except for as noted in the HPI.          Physical Exam:     Vitals: /88   Pulse 88   Ht 1.575 m (5' 2\")   Wt 45.8 kg (101 lb)   BMI 18.47 kg/m    Wt Readings from Last 10 Encounters:   11/19/19 45.8 kg (101 lb)   10/23/19 43.5 kg (96 lb)   09/04/19 44.5 kg (98 lb)   08/23/19 43.5 kg (96 lb)       Constitutional:  Patient is pleasant, alert, cooperative, and in NAD.  HEENT:  NCAT. PERRLA. EOM's intact.   Neck:  CVP appears normal. No carotid bruits.   Pulmonary: Normal respiratory effort. CTAB.   Cardiac: RRR, with intermittent runs of rapid regular beats, normal S1/S2, no S3/S4, no murmur or rub.   Abdomen:  Non-tender abdomen, no hepatosplenomegaly appreciated.   Vascular: Pulses in the upper and lower extremities are 2+ and equal bilaterally.  Extremities: No edema, erythema, cyanosis or tenderness appreciated.  Skin:  No rashes or lesions appreciated.   Neurological:  No gross motor or sensory deficits.   Psych: Appropriate affect.        Data:   Labs reviewed:  Recent Labs   Lab Test 10/08/19  1006 08/23/19  1527   TSH 0.05* 4.68*       Lab Results   Component Value Date    WBC 5.5 08/23/2019    RBC 4.75 08/23/2019    HGB 15.7 08/23/2019    HCT 46.6 08/23/2019    MCV 98 08/23/2019    MCH 33.1 (H) 08/23/2019    MCHC 33.7 08/23/2019    RDW 13.8 08/23/2019     08/23/2019       Lab Results   Component Value Date     08/23/2019    POTASSIUM 4.0 08/23/2019    CHLORIDE 107 08/23/2019    CO2 33 (H) 08/23/2019    ANIONGAP 4 08/23/2019    GLC 86 08/23/2019    BUN 22 08/23/2019    CR 0.76 08/23/2019    GFRESTIMATED 72 08/23/2019    GFRESTBLACK 84 08/23/2019    RAEANN 9.1 08/23/2019      Lab Results   Component Value Date    AST 35 08/23/2019 "    ALT 37 08/23/2019       No results found for: A1C    No results found for: INR        Problem List:     Patient Active Problem List   Diagnosis     Protein-calorie malnutrition (H)     Vitamin D deficiency     Hypothyroidism     Irregular heart beat     NSVT (nonsustained ventricular tachycardia) (H)     Cardiomyopathy (H)           Medications:     Current Outpatient Medications   Medication Sig Dispense Refill     brimonidine (ALPHAGAN) 0.2 % ophthalmic solution Place 1 drop into both eyes 2 times daily 1 Bottle 3     dorzolamide-timolol PF (COSOPT) 22.3-6.8 MG/ML opthalmic solution 1 drop 2 times daily       latanoprost (XALATAN) 0.005 % ophthalmic solution 1 drop daily       levothyroxine (SYNTHROID/LEVOTHROID) 75 MCG tablet Take 1 tablet (75 mcg) by mouth daily 31 tablet 3     metoprolol succinate ER (TOPROL-XL) 25 MG 24 hr tablet Take 0.5 tablets (12.5 mg) by mouth daily 16 tablet 11     Nutritional Supplements (NUTRITIONAL SUPPLEMENT PO) Take by mouth daily       polyethylene glycol-propylene glycol (SYSTANE ULTRA) 0.4-0.3 % SOLN ophthalmic solution Place 1 drop into both eyes 2 times daily as needed for dry eyes       vitamin D3 (CHOLECALCIFEROL) 1000 units (25 mcg) tablet Take 1 tablet (1,000 Units) by mouth daily 90 tablet 3     timolol maleate (TIMOPTIC) 0.25 % ophthalmic solution 1 drop 2 times daily             Past Medical History:     Past Medical History:   Diagnosis Date     Cardiomyopathy (H)     mild, EF 46%      Hypothyroidism 8/30/2019     Irregular heart beat 8/30/2019     NSVT (nonsustained ventricular tachycardia) (H)      Vitamin D deficiency 8/30/2019     Past Surgical History:   Procedure Laterality Date     MASTECTOMY Right      History reviewed. No pertinent family history.  Social History     Socioeconomic History     Marital status:      Spouse name: Not on file     Number of children: Not on file     Years of education: Not on file     Highest education level: Not on file    Occupational History     Not on file   Social Needs     Financial resource strain: Not on file     Food insecurity:     Worry: Not on file     Inability: Not on file     Transportation needs:     Medical: Not on file     Non-medical: Not on file   Tobacco Use     Smoking status: Never Smoker     Smokeless tobacco: Never Used   Substance and Sexual Activity     Alcohol use: Yes     Alcohol/week: 1.0 standard drinks     Types: 1 Glasses of wine per week     Frequency: 4 or more times a week     Drinks per session: 1 or 2     Binge frequency: Never     Drug use: Never     Sexual activity: Not Currently   Lifestyle     Physical activity:     Days per week: Not on file     Minutes per session: Not on file     Stress: Not on file   Relationships     Social connections:     Talks on phone: Not on file     Gets together: Not on file     Attends Yarsani service: Not on file     Active member of club or organization: Not on file     Attends meetings of clubs or organizations: Not on file     Relationship status: Not on file     Intimate partner violence:     Fear of current or ex partner: Not on file     Emotionally abused: Not on file     Physically abused: Not on file     Forced sexual activity: Not on file   Other Topics Concern     Parent/sibling w/ CABG, MI or angioplasty before 65F 55M? Not Asked   Social History Narrative     Not on file           Allergies:   No known allergies      Charity Acosta PA-C, GRACIA  [facility]  Pager: [phone]

## 2019-11-19 NOTE — PATIENT INSTRUCTIONS
Today's Plan:   - Please increase the metoprolol to 25 mg daily.   - In one week, facility RN's to check heart rate and blood pressure daily x 1 week. My RN will touch base in two weeks to see how they're running.   - Return in 6 months to see Dr. Friedman with a repeat echocardiogram prior.     If you have questions or concerns please call my nurse team at [phone].  Scheduling phone number: [phone]  Reminder: Please bring in all current medications, over the counter supplements and vitamin bottles to your next appointment.    It was a pleasure seeing you today!     Charity Acosta PA-C, PA-C

## 2019-11-20 ENCOUNTER — TELEPHONE (OUTPATIENT)
Dept: CARDIOLOGY | Facility: CLINIC | Age: 84
End: 2019-11-20

## 2019-11-20 DIAGNOSIS — I10 BENIGN ESSENTIAL HYPERTENSION: Primary | ICD-10-CM

## 2019-11-20 NOTE — TELEPHONE ENCOUNTER
Reminder sent to call [facility] on 12/3/19 for BP/HR update.    ISAC Oseguera 2:06 PM 11/20/2019

## 2019-11-20 NOTE — TELEPHONE ENCOUNTER
----- Message from Charity Acosta PA-C sent at 11/19/2019  4:10 PM CST -----  Hi, I saw June today and went up on her Toprol. She's at a facility so I wrote orders to have RN's check BP/HR x 1 week starting next week. Can you call and see how they're running in two weeks? Thanks!

## 2019-11-21 ENCOUNTER — TELEPHONE (OUTPATIENT)
Dept: FAMILY MEDICINE | Facility: CLINIC | Age: 84
End: 2019-11-21

## 2019-11-21 NOTE — TELEPHONE ENCOUNTER
Our goal is to have forms completed within 72 hours, however some forms may require a visit or additional information.    What clinic location was the form placed at [facility] or [place].?     Who is the form from?   Where did the form come from? Faxed to clinic   The form was placed in the inbox of [facility]      Please fax to [phone]  Phone number: [phone]    Additional comments: kang resident attempted to elope facility needs sig     Call take on 11/21/2019 at 4:54 PM by Rosita Harris

## 2019-11-22 ENCOUNTER — MEDICAL CORRESPONDENCE (OUTPATIENT)
Dept: HEALTH INFORMATION MANAGEMENT | Facility: CLINIC | Age: 84
End: 2019-11-22

## 2019-11-25 VITALS
RESPIRATION RATE: 18 BRPM | TEMPERATURE: 98 F | WEIGHT: 101 LBS | SYSTOLIC BLOOD PRESSURE: 165 MMHG | HEART RATE: 76 BPM | BODY MASS INDEX: 18.47 KG/M2 | DIASTOLIC BLOOD PRESSURE: 115 MMHG

## 2019-11-25 NOTE — PROGRESS NOTES
[facility]  [place] Medical Record Number:  [id number]  Place of Service where encounter took place[facility][id number]]  Chief Complaint   Patient presents with     RECHECK       HPI:    Frances Alexander  is a 84 year old (1935), who is being seen today for an episodic care visit.  HPI information obtained from: facility chart records, facility staff, patient report, [facility] chart review and Care Everywhere [facility] chart review.     Today's concern is:  1. Dementia without behavioral disturbance, unspecified dementia type (H)    2. Nonsustained ventricular tachycardia (H)    3. Cardiomyopathy, unspecified type (H)    4. Long toenail    5. Bilateral hearing loss, unspecified hearing loss type      Ms. Alexander was visited today while listening to the [place]. She appears to need things repeated several times before interpreting what is being asked. Son states that Frances has been complaining of left foot pain which she denies today. She feels that she has been sleeping okay. Staff does not have concerns at this time.     Past Medical and Surgical History reviewed in Epic today.    MEDICATIONS:  Current Outpatient Medications   Medication Sig Dispense Refill     brimonidine (ALPHAGAN) 0.2 % ophthalmic solution Place 1 drop into both eyes 2 times daily 1 Bottle 3     dorzolamide-timolol PF (COSOPT) 22.3-6.8 MG/ML opthalmic solution 1 drop 2 times daily       latanoprost (XALATAN) 0.005 % ophthalmic solution 1 drop daily       levothyroxine (SYNTHROID/LEVOTHROID) 75 MCG tablet Take 1 tablet (75 mcg) by mouth daily 31 tablet 3     metoprolol succinate ER (TOPROL-XL) 25 MG 24 hr tablet Take 1 tablet (25 mg) by mouth daily 16 tablet 11     Nutritional Supplements (NUTRITIONAL SUPPLEMENT PO) Take by mouth daily       polyethylene glycol-propylene glycol (SYSTANE ULTRA) 0.4-0.3 % SOLN ophthalmic solution Place 1 drop into both eyes 2 times daily as  needed for dry eyes       vitamin D3 (CHOLECALCIFEROL) 1000 units (25 mcg) tablet Take 1 tablet (1,000 Units) by mouth daily 90 tablet 3       REVIEW OF SYSTEMS:  Limited secondary to cognitive impairment but today pt reports no pain    Objective:  BP (!) 165/115   Pulse 76   Temp 98  F (36.7  C)   Resp 18   Wt 45.8 kg (101 lb)   BMI 18.47 kg/m    Exam:  GENERAL APPEARANCE:  Alert, in no distress, pleasant, cooperative  RESP: no respiratory distress, Lung sounds clear, patient is on room air  CV:  rate and rhythm regular, no murmur. Edema none in bilateral lower extremities. VASCULAR: warm extremities without open areas.  ABDOMEN: normal bowel sounds, soft, nontender.  M/S:   Gait and station: ambulates independently without the use of an aid, no tenderness or swelling of the joints; able to move all extremities   SKIN:  Inspection and palpation of skin and subcutaneous tissue: skin warm, dry and intact without rashes. Long toenails on all toes with small left toenail hanging over tip of toe.   NEURO: no facial asymmetry, no speech deficits and able to follow directions, moves all extremities symmetrically  PSYCH:  insight, judgement, and memory impaired affect and mood normal    Labs:   CBC RESULTS:   Recent Labs   Lab Test 08/23/19  1532   WBC 5.5   RBC 4.75   HGB 15.7   HCT 46.6   MCV 98   MCH 33.1*   MCHC 33.7   RDW 13.8          Last Basic Metabolic Panel:  Recent Labs   Lab Test 08/23/19  1527      POTASSIUM 4.0   CHLORIDE 107   RAEANN 9.1   CO2 33*   BUN 22   CR 0.76   GLC 86       Liver Function Studies -   Recent Labs   Lab Test 08/23/19  1527   PROTTOTAL 6.5*   ALBUMIN 3.6   BILITOTAL 0.4   ALKPHOS 69   AST 35   ALT 37       TSH   Date Value Ref Range Status   10/08/2019 0.05 (L) 0.40 - 4.00 mU/L Final   08/23/2019 4.68 (H) 0.40 - 4.00 mU/L Final       ASSESSMENT/PLAN:  (F03.90) Dementia without behavioral disturbance, unspecified dementia type (H)  (primary encounter diagnosis)  Comment:  Slums 18/30. Appears to be settling in well.   Plan: continue with memory care assisted living for assistance with cares, meals and medications.     (I47.2) Nonsustained ventricular tachycardia (H)  (I42.9) Cardiomyopathy, unspecified type (H)  Comment: regular rhythm today. HR 76-86 with recent increase of metoprolol by cardiology. Is hypertensive to 160's/100s but will hold off on adjusting meds today as staff will be taking BP/HR x 1 week and working with cardiology to adjust meds as needed.   Plan: continue with metoprolol 25 mg daily.     (L60.2) Long toenail  Comment: was complaining of pain to the left foot but cannot appreciate that today. She does however, have long toenails that are digging into the skin so that may be causing pain.   Plan: staff to trim toenails.   --she should see podiatry when they are in house.     (H91.93) hard of hearing  Plan: will see in house audiologist. If they do not come to the facility then she should be referred to outpatient clinic for assessment.     Electronically signed by:  MARTY Oviedo CNP

## 2019-11-26 ENCOUNTER — ASSISTED LIVING VISIT (OUTPATIENT)
Dept: GERIATRICS | Facility: CLINIC | Age: 84
End: 2019-11-26
Payer: COMMERCIAL

## 2019-11-26 DIAGNOSIS — L60.2 LONG TOENAIL: ICD-10-CM

## 2019-11-26 DIAGNOSIS — I42.9 CARDIOMYOPATHY, UNSPECIFIED TYPE (H): ICD-10-CM

## 2019-11-26 DIAGNOSIS — I47.29 NONSUSTAINED VENTRICULAR TACHYCARDIA (H): ICD-10-CM

## 2019-11-26 DIAGNOSIS — F03.90 DEMENTIA WITHOUT BEHAVIORAL DISTURBANCE, UNSPECIFIED DEMENTIA TYPE: Primary | ICD-10-CM

## 2019-11-26 DIAGNOSIS — H91.93 BILATERAL HEARING LOSS, UNSPECIFIED HEARING LOSS TYPE: ICD-10-CM

## 2019-11-26 NOTE — LETTER
11/26/2019        RE: Frances Alexander  [facility]  [address]  [facility] [address]        [facility]  [facility] Medical Record Number:  [id number]  Place of Service where encounter took place:  [facility]  [facility]IV[facility] - [facility] ([facility]) [[id number]]  Chief Complaint   Patient presents with     RECHECK       HPI:    Frances Alexander  is a 84 year old (1935), who is being seen today for an episodic care visit.  HPI information obtained from: facility chart records, facility staff, patient report, [facility] chart review and Care Everywhere [facility] chart review.     Today's concern is:  1. Dementia without behavioral disturbance, unspecified dementia type (H)    2. Nonsustained ventricular tachycardia (H)    3. Cardiomyopathy, unspecified type (H)    4. Long toenail    5. Bilateral hearing loss, unspecified hearing loss type      Ms. Alexander was visited today while listening to the [employer] music. She appears to need things repeated several times before interpreting what is being asked. Son states that Frances has been complaining of left foot pain which she denies today. She feels that she has been sleeping okay. Staff does not have concerns at this time.     Past Medical and Surgical History reviewed in Epic today.    MEDICATIONS:  Current Outpatient Medications   Medication Sig Dispense Refill     brimonidine (ALPHAGAN) 0.2 % ophthalmic solution Place 1 drop into both eyes 2 times daily 1 Bottle 3     dorzolamide-timolol PF (COSOPT) 22.3-6.8 MG/ML opthalmic solution 1 drop 2 times daily       latanoprost (XALATAN) 0.005 % ophthalmic solution 1 drop daily       levothyroxine (SYNTHROID/LEVOTHROID) 75 MCG tablet Take 1 tablet (75 mcg) by mouth daily 31 tablet 3     metoprolol succinate ER (TOPROL-XL) 25 MG 24 hr tablet Take 1 tablet (25 mg) by mouth daily 16 tablet 11     Nutritional Supplements (NUTRITIONAL SUPPLEMENT PO) Take by mouth daily       polyethylene  glycol-propylene glycol (SYSTANE ULTRA) 0.4-0.3 % SOLN ophthalmic solution Place 1 drop into both eyes 2 times daily as needed for dry eyes       vitamin D3 (CHOLECALCIFEROL) 1000 units (25 mcg) tablet Take 1 tablet (1,000 Units) by mouth daily 90 tablet 3       REVIEW OF SYSTEMS:  Limited secondary to cognitive impairment but today pt reports no pain    Objective:  BP (!) 165/115   Pulse 76   Temp 98  F (36.7  C)   Resp 18   Wt 45.8 kg (101 lb)   BMI 18.47 kg/m     Exam:  GENERAL APPEARANCE:  Alert, in no distress, pleasant, cooperative  RESP: no respiratory distress, Lung sounds clear, patient is on room air  CV:  rate and rhythm regular, no murmur. Edema none in bilateral lower extremities. VASCULAR: warm extremities without open areas.  ABDOMEN: normal bowel sounds, soft, nontender.  M/S:   Gait and station: ambulates independently without the use of an aid, no tenderness or swelling of the joints; able to move all extremities   SKIN:  Inspection and palpation of skin and subcutaneous tissue: skin warm, dry and intact without rashes. Long toenails on all toes with small left toenail hanging over tip of toe.   NEURO: no facial asymmetry, no speech deficits and able to follow directions, moves all extremities symmetrically  PSYCH:  insight, judgement, and memory impaired affect and mood normal    Labs:   CBC RESULTS:   Recent Labs   Lab Test 08/23/19  1532   WBC 5.5   RBC 4.75   HGB 15.7   HCT 46.6   MCV 98   MCH 33.1*   MCHC 33.7   RDW 13.8          Last Basic Metabolic Panel:  Recent Labs   Lab Test 08/23/19  1527      POTASSIUM 4.0   CHLORIDE 107   RAEANN 9.1   CO2 33*   BUN 22   CR 0.76   GLC 86       Liver Function Studies -   Recent Labs   Lab Test 08/23/19  1527   PROTTOTAL 6.5*   ALBUMIN 3.6   BILITOTAL 0.4   ALKPHOS 69   AST 35   ALT 37       TSH   Date Value Ref Range Status   10/08/2019 0.05 (L) 0.40 - 4.00 mU/L Final   08/23/2019 4.68 (H) 0.40 - 4.00 mU/L Final        ASSESSMENT/PLAN:  (F03.90) Dementia without behavioral disturbance, unspecified dementia type (H)  (primary encounter diagnosis)  Comment: Slums 18/30. Appears to be settling in well.   Plan: continue with memory care assisted living for assistance with cares, meals and medications.     (I47.2) Nonsustained ventricular tachycardia (H)  (I42.9) Cardiomyopathy, unspecified type (H)  Comment: regular rhythm today. HR 76-86 with recent increase of metoprolol by cardiology. Is hypertensive to 160's/100s but will hold off on adjusting meds today as staff will be taking BP/HR x 1 week and working with cardiology to adjust meds as needed.   Plan: continue with metoprolol 25 mg daily.     (L60.2) Long toenail  Comment: was complaining of pain to the left foot but cannot appreciate that today. She does however, have long toenails that are digging into the skin so that may be causing pain.   Plan: staff to trim toenails.   --she should see podiatry when they are in house.     (H91.93) hard of hearing  Plan: will see in house audiologist. If they do not come to the facility then she should be referred to outpatient clinic for assessment.     Electronically signed by:  MARTY Oviedo CNP               Sincerely,        MARTY Oviedo CNP

## 2019-12-03 ENCOUNTER — TRANSFERRED RECORDS (OUTPATIENT)
Dept: HEALTH INFORMATION MANAGEMENT | Facility: CLINIC | Age: 84
End: 2019-12-03

## 2019-12-03 ENCOUNTER — RECORDS - HEALTHEAST (OUTPATIENT)
Dept: LAB | Facility: CLINIC | Age: 84
End: 2019-12-03

## 2019-12-03 LAB
TSH SERPL DL<=0.005 MIU/L-ACNC: 1.15 UIU/ML (ref 0.3–5)
TSH SERPL-ACNC: 1.15 UIU/ML (ref 0.3–5)

## 2019-12-05 NOTE — TELEPHONE ENCOUNTER
Received faxed update from [facility] with update on pt's BP and HR sine 11/21/19 when Toprol dose was increased to 25mg daily.         Routed to GRACIA Mohan for review.    ISAC Oseguera 2:55 PM 12/5/2019

## 2019-12-06 RX ORDER — LISINOPRIL 5 MG/1
5 TABLET ORAL DAILY
Qty: 90 TABLET | Refills: 3 | Status: SHIPPED | OUTPATIENT
Start: 2019-12-06 | End: 2020-01-29

## 2019-12-06 NOTE — TELEPHONE ENCOUNTER
Faxed order to [facility] to:  -start lisinopril 5mg daily  -draw BMP on 12/13/19  -fax BP/HR update along with BMP results on 12/13/19    Also called [facility] and updated pt's nurse regarding orders faxed.    Also called pt's son, Frank, and LVM with update on pt's BP/HR since metoprolol dose increase on 11/19/19 and new orders from [facility]AC[facility] Mohan today.  Left callback number for questions/concerns.     ISAC Oseguera 1:20 PM 12/6/2019

## 2019-12-10 ENCOUNTER — ASSISTED LIVING VISIT (OUTPATIENT)
Dept: GERIATRICS | Facility: CLINIC | Age: 84
End: 2019-12-10
Payer: COMMERCIAL

## 2019-12-10 DIAGNOSIS — Z23 ENCOUNTER FOR IMMUNIZATION: Primary | ICD-10-CM

## 2019-12-16 ENCOUNTER — RECORDS - HEALTHEAST (OUTPATIENT)
Dept: LAB | Facility: CLINIC | Age: 84
End: 2019-12-16

## 2019-12-16 ENCOUNTER — TRANSFERRED RECORDS (OUTPATIENT)
Dept: HEALTH INFORMATION MANAGEMENT | Facility: CLINIC | Age: 84
End: 2019-12-16

## 2019-12-16 LAB
ANION GAP SERPL CALCULATED.3IONS-SCNC: 8 MMOL/L (ref 5–18)
ANION GAP SERPL CALCULATED.3IONS-SCNC: 8 MMOL/L (ref 5–18)
BUN SERPL-MCNC: 14 MG/DL (ref 8–28)
BUN SERPL-MCNC: 14 MG/DL (ref 8–28)
CALCIUM SERPL-MCNC: 9.6 MG/DL (ref 8.5–10.5)
CALCIUM SERPL-MCNC: 9.6 MG/DL (ref 8.5–10.5)
CHLORIDE BLD-SCNC: 102 MMOL/L (ref 98–107)
CHLORIDE SERPLBLD-SCNC: 102 MMOL/L (ref 98–107)
CO2 SERPL-SCNC: 32 MMOL/L (ref 22–31)
CO2 SERPL-SCNC: 32 MMOL/L (ref 22–31)
CREAT SERPL-MCNC: 0.82 MG/DL (ref 0.6–1.1)
CREAT SERPL-MCNC: 0.82 MG/DL (ref 0.6–1.1)
GFR SERPL CREATININE-BSD FRML MDRD: >60 ML/MIN/1.73M2
GFR SERPL CREATININE-BSD FRML MDRD: >60 ML/MIN/1.73M2
GLUCOSE BLD-MCNC: 103 MG/DL (ref 70–125)
GLUCOSE SERPL-MCNC: 103 MG/DL (ref 70–125)
POTASSIUM BLD-SCNC: 4.3 MMOL/L (ref 3.5–5)
POTASSIUM SERPL-SCNC: 4.3 MMOL/L (ref 3.5–5)
SODIUM SERPL-SCNC: 142 MMOL/L (ref 136–145)
SODIUM SERPL-SCNC: 142 MMOL/L (ref 136–145)

## 2019-12-16 NOTE — TELEPHONE ENCOUNTER
Received fax from [facility] on 12/13/19 with BP update:      Only one BP reading included since pt started lisinopril 5mg daily on 12/6/19.  Called [facility], no answer, LVM requesting to know if anymore BP readings available since 12/6/19.  Also requested BMP results that were supposed to be drawn on 12/13/19 be faxed.    ISAC Oseguera 9:03 AM 12/16/2019      _______________________________________________    Received return call from pt's nurse, Aysha, at [facility] who reports that their current orders are to check pt's vital signs once a week on Thursdays. Discussed with Aysha that will update her provider with this information and send new orders if she would like BP checked more frequently.      Aysha also reports that BMP that was ordered for 12/13/19 will be done today, 12/16/19, and they will fax results once received.    ISAC Oseguera 9:51 AM 12/16/2019

## 2019-12-16 NOTE — TELEPHONE ENCOUNTER
Faxed order to [facility] nursing to check BP 2 hours after AM meds everyday for 2 weeks and requested BP update on 1/2/20.    Updated Aysha, nurse, at [facility] regarding order as well.    ISAC Oseguera 2:43 PM 12/16/2019

## 2019-12-18 ENCOUNTER — CARE COORDINATION (OUTPATIENT)
Dept: CARDIOLOGY | Facility: CLINIC | Age: 84
End: 2019-12-18

## 2019-12-18 NOTE — PROGRESS NOTES
Received fax from [facility] with BMP results from 12/16/19.    Results entered in Epic and routed to GRACIA Mohan for review, as lab was ordered after pt started lisinopril on 12/6/19.     Component      Latest Ref Rng & Units 12/16/2019   Sodium      136 - 145 mmol/L 142   Potassium      3.5 - 5.0 mmol/L 4.3   Chloride      98 - 107 mmol/L 102   Carbon Dioxide      22 - 31 mmol/L 32 (A)   Anion Gap      5 - 18 mmol/L 8   Glucose      70 - 125 mg/dL 103 (A)   Urea Nitrogen      8 - 28 mg/dL 14   Creatinine      0.60 - 1.10 mg/dL 0.82   Calcium      8.5 - 10.5 mg/dL 9.6   GFR Estimate      >60 ml/min/1.73m2 >60   GFR Estimate If Black      >60 ml/min/1.73m2 >60       MLISAC ascencio 1:42 PM 12/18/2019

## 2019-12-20 NOTE — TELEPHONE ENCOUNTER
Received fax from [facility] stating pt's BP will be checked an hour after medication administration instead of 2 hours after.      ISAC Oseguera 3:03 PM 12/20/2019

## 2019-12-31 ENCOUNTER — MEDICAL CORRESPONDENCE (OUTPATIENT)
Dept: HEALTH INFORMATION MANAGEMENT | Facility: CLINIC | Age: 84
End: 2019-12-31

## 2020-01-06 ENCOUNTER — TELEPHONE (OUTPATIENT)
Dept: CARDIOLOGY | Facility: CLINIC | Age: 85
End: 2020-01-06

## 2020-01-06 NOTE — TELEPHONE ENCOUNTER
Highly variable pressures... generally appear to be well-controlled to low after meds in the AM but become uncontrolled in the PM. Let's increase the lisinopril to 5 mg qAM and 2.5 mg qPM and ask them to check an AM and PM BP + BMP in two weeks. Thanks!

## 2020-01-06 NOTE — TELEPHONE ENCOUNTER
Received fax from [facility] with pt's BPs from 12/18/19-12/31/19.         GRACIA Mohan increased Toprol to 25mg daily on 11/19/19 and lisinopril 5mg daily started on 12/5/19.     Routed to GRACIA Mohan for review.     ISAC Oseguera 2:45 PM 1/6/2020

## 2020-01-06 NOTE — TELEPHONE ENCOUNTER
Fax received from [facility] with VS from 12-1-19 to 12-31-19.  BP ranging from 100-145/70-90. HR 70-s to 80's.    Patient increased  Toprol 25 mg daily at [facility] . 11-19-19 by Charity KNOX   Also in Lisinopril 5 mg. Daily   Next Dr. Friedman OV due with echo May 2020.     BP updated sent to scan.

## 2020-01-07 NOTE — TELEPHONE ENCOUNTER
[facility]:  -Increase lisinopril to 5mg qAM and 2.5mg qPM  -BMP on 1/21/20  -Please check BP q AM and qPM at least 1 hour after medications    Called [facility] and LVM with above orders and that they have been faxed and will plan to call for BP update on 1/21/20.    ISAC Oseguera 3:19 PM 1/7/2020

## 2020-01-15 ENCOUNTER — RECORDS - HEALTHEAST (OUTPATIENT)
Dept: LAB | Facility: CLINIC | Age: 85
End: 2020-01-15

## 2020-01-15 LAB — TSH SERPL DL<=0.005 MIU/L-ACNC: 2.31 UIU/ML (ref 0.3–5)

## 2020-01-21 ENCOUNTER — CARE COORDINATION (OUTPATIENT)
Dept: CARDIOLOGY | Facility: CLINIC | Age: 85
End: 2020-01-21

## 2020-01-21 ENCOUNTER — RECORDS - HEALTHEAST (OUTPATIENT)
Dept: LAB | Facility: CLINIC | Age: 85
End: 2020-01-21

## 2020-01-21 ENCOUNTER — TRANSFERRED RECORDS (OUTPATIENT)
Dept: HEALTH INFORMATION MANAGEMENT | Facility: CLINIC | Age: 85
End: 2020-01-21

## 2020-01-21 LAB
ANION GAP SERPL CALCULATED.3IONS-SCNC: 6 MMOL/L (ref 5–18)
ANION GAP SERPL CALCULATED.3IONS-SCNC: 6 MMOL/L (ref 5–18)
BUN SERPL-MCNC: 19 MG/DL (ref 8–28)
BUN SERPL-MCNC: 19 MG/DL (ref 8–28)
CALCIUM SERPL-MCNC: 9.5 MG/DL (ref 8.5–10.5)
CALCIUM SERPL-MCNC: 9.5 MG/DL (ref 8.5–10.5)
CHLORIDE BLD-SCNC: 103 MMOL/L (ref 98–107)
CHLORIDE SERPLBLD-SCNC: 103 MMOL/L (ref 98–107)
CO2 SERPL-SCNC: 33 MMOL/L (ref 22–31)
CO2 SERPL-SCNC: 33 MMOL/L (ref 22–31)
CREAT SERPL-MCNC: 0.84 MG/DL (ref 0.6–1.1)
CREAT SERPL-MCNC: 0.84 MG/DL (ref 0.6–1.1)
GFR SERPL CREATININE-BSD FRML MDRD: >60 ML/MIN/1.73M2
GFR SERPL CREATININE-BSD FRML MDRD: >60 ML/MIN/1.73M2
GLUCOSE BLD-MCNC: 76 MG/DL (ref 70–125)
GLUCOSE SERPL-MCNC: 76 MG/DL (ref 70–125)
POTASSIUM BLD-SCNC: 3.9 MMOL/L (ref 3.5–5)
POTASSIUM SERPL-SCNC: 3.9 MMOL/L (ref 3.5–5)
SODIUM SERPL-SCNC: 142 MMOL/L (ref 136–145)
SODIUM SERPL-SCNC: 142 MMOL/L (ref 136–145)

## 2020-01-21 NOTE — PROGRESS NOTES
Called [facility], no answer, LVM requesting BP update since 1/7/20 and BMP results from today be faxed.    ISAC Oseguera 4:10 PM 1/21/2020

## 2020-01-29 DIAGNOSIS — I10 BENIGN ESSENTIAL HYPERTENSION: ICD-10-CM

## 2020-01-29 RX ORDER — LISINOPRIL 2.5 MG/1
TABLET ORAL
Qty: 30 TABLET | Refills: 11 | Status: SHIPPED | OUTPATIENT
Start: 2020-01-29 | End: 2020-01-29

## 2020-01-29 RX ORDER — LISINOPRIL 5 MG/1
TABLET ORAL
Qty: 30 TABLET | Refills: 11 | Status: SHIPPED | OUTPATIENT
Start: 2020-01-29 | End: 2020-01-29

## 2020-01-31 NOTE — PROGRESS NOTES
Received fax from [facility] with BMP results from 1/21/20.  Results entered in Epic and below for review:  Component      Latest Ref Rng & Units 12/16/2019 1/21/2020   Sodium      136 - 145 mmol/L 142 142   Potassium      3.5 - 5.0 mmol/L 4.3 3.9   Chloride      98 - 107 mmol/L 102 103   Carbon Dioxide      22 - 31 mmol/L 32 (A) 33 (A)   Anion Gap      5 - 18 mmol/L 8 6   Glucose      70 - 125 mg/dL 103 (A) 76   Urea Nitrogen      8 - 28 mg/dL 14 19   Creatinine      0.60 - 1.10 mg/dL 0.82 0.84   Calcium      8.5 - 10.5 mg/dL 9.6 9.5   GFR Estimate      >60 ml/min/1.73m2 >60 >60   GFR Estimate If Black      >60 ml/min/1.73m2 >60 >60     Have not received BP update that was requested.  Will plan to call again on 2/3/20 for BP update.     ISAC Oseguera 5:20 PM 1/31/2020

## 2020-02-02 RX ORDER — LISINOPRIL 5 MG/1
5 TABLET ORAL EVERY MORNING
Qty: 30 TABLET | Refills: 98 | Status: SHIPPED | OUTPATIENT
Start: 2020-02-02 | End: 2020-11-11

## 2020-02-02 RX ORDER — LISINOPRIL 2.5 MG/1
TABLET ORAL
Qty: 30 TABLET | Refills: 98 | Status: SHIPPED | OUTPATIENT
Start: 2020-02-02 | End: 2021-01-19

## 2020-02-12 NOTE — PROGRESS NOTES
Received VM from pt's son, Frank, who reports that pt's BID BPs are costing $225/month and is wondering if BID BPs are still necessary.     Per chart review, have not received BP update since pt increased lisinopril dose on 1/7/20 and BID BPs were ordered.  Called [facility], no answer, LVM requesting BP update since 1/7/20 be faxed for review.     Called and spoke with Frank and informed him of above information and will plan to call him back once [facility] Mohan has reviewed BPs.  He agrees with this plan.    ISAC Oseguera 11:57 AM 2/12/2020

## 2020-02-12 NOTE — PROGRESS NOTES
Received fax from [facility] with pt's BP and pulse since early January.     Will route to GRACIA Mohan for review and will see if BID BPs should be continued or not. Will plan to update pt's son, Frank, with GRACIA Mohan's recommendation.     ISAC Oseguera 5:22 PM 2/12/2020     no

## 2020-02-13 NOTE — PROGRESS NOTES
RN called patient's son Frank and reviewed with him [facility] Charity Acosta recommendations. Patient's son verbalized understanding and advised this RN to call patient's nursing home to advise them that BID BP checks are no longer needed, just routine.       RN called [facility] and spoke with patient's RN and advised her that [facility] Charity Acosta no longer needs twice daily BP checks. Patient's RN requested we send signed physicians copy cancelling BID BP checks and they will routinely check it once a week.    RN faxed signed order to discontinue BP checks to Fax: [phone]

## 2020-02-13 NOTE — PROGRESS NOTES
Quite variable, but overall appear fairly well-controlled. Would make no changes for now, and they do not need to continue obtaining routine BP's. Thanks!

## 2020-02-17 VITALS
RESPIRATION RATE: 16 BRPM | WEIGHT: 104 LBS | SYSTOLIC BLOOD PRESSURE: 86 MMHG | HEART RATE: 71 BPM | DIASTOLIC BLOOD PRESSURE: 70 MMHG | BODY MASS INDEX: 19.02 KG/M2 | TEMPERATURE: 98.4 F

## 2020-02-18 ENCOUNTER — ASSISTED LIVING VISIT (OUTPATIENT)
Dept: GERIATRICS | Facility: CLINIC | Age: 85
End: 2020-02-18
Payer: COMMERCIAL

## 2020-02-18 DIAGNOSIS — I47.10 SVT (SUPRAVENTRICULAR TACHYCARDIA) (H): ICD-10-CM

## 2020-02-18 DIAGNOSIS — E44.0 MODERATE PROTEIN-CALORIE MALNUTRITION (H): ICD-10-CM

## 2020-02-18 DIAGNOSIS — E03.9 HYPOTHYROIDISM, UNSPECIFIED TYPE: ICD-10-CM

## 2020-02-18 DIAGNOSIS — G30.1 LATE ONSET ALZHEIMER'S DISEASE WITHOUT BEHAVIORAL DISTURBANCE (H): ICD-10-CM

## 2020-02-18 DIAGNOSIS — I10 BENIGN ESSENTIAL HYPERTENSION: Primary | ICD-10-CM

## 2020-02-18 DIAGNOSIS — F02.80 LATE ONSET ALZHEIMER'S DISEASE WITHOUT BEHAVIORAL DISTURBANCE (H): ICD-10-CM

## 2020-02-18 DIAGNOSIS — I42.9 CARDIOMYOPATHY, UNSPECIFIED TYPE (H): ICD-10-CM

## 2020-02-18 NOTE — LETTER
"    2/18/2020        RE: Frances Alexander  [facility]  [address]  [facility] [address]        Frances Alexander is a 84 year old female seen February 24, 2020 at [facility] Memory Care unit where she has resided for 4 months (admit 10/2019) seen to follow up dementia, HTN and hypothyroidism.   Pt is seen in her apartment, pleasant and welcoming but confused    States \"my 89 year old mother is still down there in the dining room.\"  She has some difficulty hearing and understanding questions.  Says no to pain, dyspnea or any current CV symptoms.        By chart review, patient was living independently in [place], but family noticed cognitive decline with weight loss and moved her to [place] in August 2019, to an AL in [place].   She did not do well there, and moved to Memory Care for additional support.   However, has had a couple of elopement attempts since admission and continues to have concerns about staff coming into her apartment for med administration and doesn't like the frequency of her meds.  She has improved overall in AL, with weight gain up to 8#     Pt was seen by Cardiology for palpitations, with Ziopatch revealing >800 short runs of SVT and one 5 beat run of VT.    She was started on metoprolol.  She was also noted to have mild cardiomyopathy of unclear etiology, EF 46%, mild -mod TR and mild pulmonary HTN    Past Medical History:   Diagnosis Date     Cardiomyopathy (H)     mild, EF 46%      Essential hypertension      Hypothyroidism 8/30/2019     Irregular heart beat 8/30/2019     NSVT (nonsustained ventricular tachycardia) (H)      Vitamin D deficiency 8/30/2019   Breast cancer, s/p Right mastectomy    Social History     Tobacco Use     Smoking status: Never Smoker     Smokeless tobacco: Never Used   Substance Use Topics     Alcohol use: Yes     Alcohol/week: 1.0 standard drinks     Types: 1 Glasses of wine per week     Frequency: 4 or more times a week     Drinks per " session: 1 or 2     Binge frequency: Never      SH:  , lives [place] Memory Care unit.  Moved to [place] from [place]   She has a son Frank, daughter-in-law Lisa and 2 grandchildren that live in [place].   Her daughter Sherrell and a granddaughter live in [place]    She also has a sister Kajal.     ROS:  SLUMS 18/30     Hearing loss  Wt Readings from Last 5 Encounters:   02/15/20 47.2 kg (104 lb)   11/21/19 45.8 kg (101 lb)   11/19/19 45.8 kg (101 lb)   10/23/19 43.5 kg (96 lb)   09/04/19 44.5 kg (98 lb)      EXAM:  NAD  BP (!) 86/70   Pulse 71   Temp 98.4  F (36.9  C)   Resp 16   Wt 47.2 kg (104 lb)   BMI 19.02 kg/m      Recheck VS today bp 118/62  HR 70  O2sat 97%   Neck supple without adenopathy  Lungs clear bilaterally with fair air movement  Heart RRR s1s2  Abd soft, NT, no distention, +BS  Ext without edema  Neuro: ambulatory without device, disoriented, +STML, confabulatory  Psych: affect a little anxious    Labs reviewed  9/27/19 Lexiscan     Impression:  1.  Myocardial perfusion imaging using single isotope technique  demonstrated normal tracer uptake at rest and stress without evidence for ischemia or infarction.   2.  Gated images demonstrated mild global hypokinesis.  The left  ventricular systolic function is mildly reduced ejection fraction of 46%.    IMP/PLAN:   (I10) Benign essential hypertension   Comment: GFR >60     BP Readings from Last 3 Encounters:   02/15/20 (!) 86/70   11/21/19 (!) 165/115   11/19/19 138/88      Pulse Readings from Last 4 Encounters:   02/15/20 71   11/21/19 76   11/19/19 88   10/23/19 68      Plan: lisinopril 7.5 mg and metoprolol 25 mg/day   Will move to  dosing, check bps in AM    (E44.0) Moderate protein-calorie malnutrition (H)  Comment: weight 96>>104#     Plan: preferences, assist to meals, Magic Cup supplement bid.        (I42.9) Cardiomyopathy, unspecified type (H)  Comment: unclear etiology, does not appear to be symptomatic     Plan: she is on an ACEI and  beta blocker.   Monitor for symptoms.        (E03.9) Hypothyroidism, unspecified type  Comment:   TSH   Date Value Ref Range Status   12/03/2019 1.15 0.30 - 5.00 uIU/mL Final      Plan: dose adjusted down.  Morning dosing of levothyroxine.     Follow TSH with goal 3-6       (I47.1) SVT (supraventricular tachycardia) (H)  Comment: >800 runs on Ziopatch reading   Plan: metoprolol ER 25 mg/day       (G30.1,  F02.80) Late onset Alzheimer's disease without behavioral disturbance (H)  Comment: confusion and disorientation, low functional status with preserved language and mobility  Plan: AL Memory Care unit for assist with meds, meals, activity; secure unit for safety     Advanced directive: pt is DNR/DNI per signed POLST Alyssa Sandra MD       Sincerely,        Alyssa Sandra MD

## 2020-02-24 NOTE — PROGRESS NOTES
"Frances Alexander is a 84 year old female seen February 24, 2020 at [facility] Memory Care unit where she has resided for 4 months (admit 10/2019) seen to follow up dementia, HTN and hypothyroidism.   Pt is seen in her apartment, pleasant and welcoming but confused    States \"my 89 year old mother is still down there in the dining room.\"  She has some difficulty hearing and understanding questions.  Says no to pain, dyspnea or any current CV symptoms.        By chart review, patient was living independently in [place], but family noticed cognitive decline with weight loss and moved [place] to [place] in August 2019, to an AL in [place].   She did not do well there, and moved to Memory Care for additional support.   However, has had a couple of elopement attempts since admission and continues to have concerns about staff coming into her apartment for med administration and doesn't like the frequency of her meds.  She has improved overall in AL, with weight gain up to 8#     Pt was seen by Cardiology for palpitations, with Ziopatch revealing >800 short runs of SVT and one 5 beat run of VT.    She was started on metoprolol.  She was also noted to have mild cardiomyopathy of unclear etiology, EF 46%, mild -mod TR and mild pulmonary HTN    Past Medical History:   Diagnosis Date     Cardiomyopathy (H)     mild, EF 46%      Essential hypertension      Hypothyroidism 8/30/2019     Irregular heart beat 8/30/2019     NSVT (nonsustained ventricular tachycardia) (H)      Vitamin D deficiency 8/30/2019   Breast cancer, s/p Right mastectomy    Social History     Tobacco Use     Smoking status: Never Smoker     Smokeless tobacco: Never Used   Substance Use Topics     Alcohol use: Yes     Alcohol/week: 1.0 standard drinks     Types: 1 Glasses of wine per week     Frequency: 4 or more times a week     Drinks per session: 1 or 2     Binge frequency: Never      SH:  , lives [place] Memory Care unit.  [place]oved to [place] from " [place]   She has a son Frank, daughter-in-law Lisa and 2 grandchildren that live in [place].   Her daughter Sherrell and a granddaughter live in [place]    She also has a sister Kajal.     ROS:  SLUMS 18/30     Hearing loss  Wt Readings from Last 5 Encounters:   02/15/20 47.2 kg (104 lb)   11/21/19 45.8 kg (101 lb)   11/19/19 45.8 kg (101 lb)   10/23/19 43.5 kg (96 lb)   09/04/19 44.5 kg (98 lb)      EXAM:  NAD  BP (!) 86/70   Pulse 71   Temp 98.4  F (36.9  C)   Resp 16   Wt 47.2 kg (104 lb)   BMI 19.02 kg/m     Recheck VS today bp 118/62  HR 70  O2sat 97%   Neck supple without adenopathy  Lungs clear bilaterally with fair air movement  Heart RRR s1s2  Abd soft, NT, no distention, +BS  Ext without edema  Neuro: ambulatory without device, disoriented, +STML, confabulatory  Psych: affect a little anxious    Labs reviewed  9/27/19 Lexiscan     Impression:  1.  Myocardial perfusion imaging using single isotope technique  demonstrated normal tracer uptake at rest and stress without evidence for ischemia or infarction.   2.  Gated images demonstrated mild global hypokinesis.  The left  ventricular systolic function is mildly reduced ejection fraction of 46%.    IMP/PLAN:   (I10) Benign essential hypertension   Comment: GFR >60     BP Readings from Last 3 Encounters:   02/15/20 (!) 86/70   11/21/19 (!) 165/115   11/19/19 138/88      Pulse Readings from Last 4 Encounters:   02/15/20 71   11/21/19 76   11/19/19 88   10/23/19 68      Plan: lisinopril 7.5 mg and metoprolol 25 mg/day   Will move to HS dosing, check bps in AM    (E44.0) Moderate protein-calorie malnutrition (H)  Comment: weight 96>>104#     Plan: preferences, assist to meals, Magic Cup supplement bid.        (I42.9) Cardiomyopathy, unspecified type (H)  Comment: unclear etiology, does not appear to be symptomatic     Plan: she is on an ACEI and beta blocker.   Monitor for symptoms.        (E03.9) Hypothyroidism, unspecified type  Comment:   TSH   Date  Value Ref Range Status   12/03/2019 1.15 0.30 - 5.00 uIU/mL Final      Plan: dose adjusted down.  Morning dosing of levothyroxine.     Follow TSH with goal 3-6       (I47.1) SVT (supraventricular tachycardia) (H)  Comment: >800 runs on Ziopatch reading   Plan: metoprolol ER 25 mg/day       (G30.1,  F02.80) Late onset Alzheimer's disease without behavioral disturbance (H)  Comment: confusion and disorientation, low functional status with preserved language and mobility  Plan: AL Memory Care unit for assist with meds, meals, activity; secure unit for safety     Advanced directive: pt is DNR/DNI per signed DELISA Sandra MD

## 2020-03-11 ENCOUNTER — HEALTH MAINTENANCE LETTER (OUTPATIENT)
Age: 85
End: 2020-03-11

## 2020-03-12 ENCOUNTER — RECORDS - HEALTHEAST (OUTPATIENT)
Dept: LAB | Facility: CLINIC | Age: 85
End: 2020-03-12

## 2020-03-12 LAB — TSH SERPL DL<=0.005 MIU/L-ACNC: 6.66 UIU/ML (ref 0.3–5)

## 2020-04-22 ENCOUNTER — DOCUMENTATION ONLY (OUTPATIENT)
Dept: CARDIOLOGY | Facility: CLINIC | Age: 85
End: 2020-04-22

## 2020-04-22 NOTE — PROGRESS NOTES
Message from patient's son, he is reluctant to take his mother out of her dementia unit and wonders if they can stop cardiology follow up.    Spoke with patient's son, patient is in a dementia care facility, she is not to leave unless it is an emergency. He would like to postpone the May echo and OV for a bit.  Reviewed with son to check with the staff for signs of heart failure such as dyspnea, peripheral edema or weight gain. He will call if there are any symptom changes, otherwise will call in early June and see if the COVID-19 restrictions are open.  Extended the date for follow up for 3 weeks.

## 2020-04-28 DIAGNOSIS — E03.9 HYPOTHYROIDISM, UNSPECIFIED TYPE: ICD-10-CM

## 2020-04-28 RX ORDER — LEVOTHYROXINE SODIUM 125 UG/1
TABLET ORAL
Qty: 14 TABLET | Refills: 97 | Status: SHIPPED | OUTPATIENT
Start: 2020-04-28 | End: 2021-11-08

## 2020-05-07 ASSESSMENT — MIFFLIN-ST. JEOR: SCORE: 856.85

## 2020-05-14 ENCOUNTER — VIRTUAL VISIT (OUTPATIENT)
Dept: GERIATRICS | Facility: CLINIC | Age: 85
End: 2020-05-14
Payer: COMMERCIAL

## 2020-05-14 VITALS
WEIGHT: 100 LBS | HEART RATE: 88 BPM | DIASTOLIC BLOOD PRESSURE: 76 MMHG | RESPIRATION RATE: 18 BRPM | BODY MASS INDEX: 18.4 KG/M2 | SYSTOLIC BLOOD PRESSURE: 124 MMHG | OXYGEN SATURATION: 96 % | HEIGHT: 62 IN

## 2020-05-14 DIAGNOSIS — G30.1 LATE ONSET ALZHEIMER'S DISEASE WITHOUT BEHAVIORAL DISTURBANCE (H): ICD-10-CM

## 2020-05-14 DIAGNOSIS — I47.10 SVT (SUPRAVENTRICULAR TACHYCARDIA) (H): Primary | ICD-10-CM

## 2020-05-14 DIAGNOSIS — I42.9 CARDIOMYOPATHY, UNSPECIFIED TYPE (H): ICD-10-CM

## 2020-05-14 DIAGNOSIS — F02.80 LATE ONSET ALZHEIMER'S DISEASE WITHOUT BEHAVIORAL DISTURBANCE (H): ICD-10-CM

## 2020-05-14 NOTE — LETTER
"    5/14/2020        RE: Frances Alexander  [facility]  [address]  [facility] [address]        [facility]   Frances Alexander is being evaluated via a billable video visit due to the restrictions of the Covid-19 pandemic.   The patient has been notified of following:  \"This video visit will be conducted via a call between you and your provider. We have found that certain health care needs can be provided without the need for an in-person physical exam.  This service lets us provide the care you need with a video conversation. If during the course of the call the provider feels a video visit is not appropriate, you will not be charged for this service.\"   The provider has received verbal consent for a Video Visit from the patient or first contact? Yes  Video Start Time: 3:12    [facility] Medical Record Number:  [id number]  Place of Location at the time of visit: [facility]   Chief Complaint   Patient presents with     RECHECK     HPI:  Frances Alexander  is a 84 year old (1935), who is being seen today for a visit.  HPI information obtained from: facility chart records, facility staff, patient report and [facility] chart review.     Ms. Alexander was visited virtually today with the assistance of facility staff. History is limited by progressive dementia but she denies pain. She is unable to follow other questions or communication in part due to decreased ability to hear.    Staff does not have concerns at this time. RN reports that she has been active on the unit and eating in the dining room. There have not been reports of insomnia or agitation.     Past Medical and Surgical History reviewed in Epic today.  MEDICATIONS:    Current Outpatient Medications   Medication Sig Dispense Refill     brimonidine (ALPHAGAN) 0.2 % ophthalmic solution Place 1 drop into both eyes 2 times daily 1 Bottle 3     dorzolamide-timolol PF (COSOPT) 22.3-6.8 MG/ML opthalmic solution 1 " "drop 2 times daily       latanoprost (XALATAN) 0.005 % ophthalmic solution 1 drop daily       levothyroxine (SYNTHROID/LEVOTHROID) 125 MCG tablet TAKE ONE-HALF TABLET (62.5MCG) BY MOUTH ONCE DAILY 14 tablet 97     levothyroxine (SYNTHROID/LEVOTHROID) 50 MCG tablet TAKE 1 TABLET BY MOUTH EVERY MORNING 30 tablet PRN     levothyroxine (SYNTHROID/LEVOTHROID) 75 MCG tablet Take 1 tablet (75 mcg) by mouth daily 31 tablet 3     lisinopril (PRINIVIL/ZESTRIL) 2.5 MG tablet TAKE 1 TABLET BY MOUTH EVERY EVENING 30 tablet 98     lisinopril (PRINIVIL/ZESTRIL) 5 MG tablet Take 1 tablet (5 mg) by mouth every morning 30 tablet 98     metoprolol succinate ER (TOPROL-XL) 25 MG 24 hr tablet Take 1 tablet (25 mg) by mouth daily 16 tablet 11     Nutritional Supplements (NUTRITIONAL SUPPLEMENT PO) Take by mouth daily       polyethylene glycol-propylene glycol (SYSTANE ULTRA) 0.4-0.3 % SOLN ophthalmic solution Place 1 drop into both eyes 2 times daily as needed for dry eyes       vitamin D3 (CHOLECALCIFEROL) 1000 units (25 mcg) tablet Take 1 tablet (1,000 Units) by mouth daily 90 tablet 3     REVIEW OF SYSTEMS: Unobtainable secondary to cognitive impairment.   Objective: /76   Pulse 88   Resp 18   Ht 1.575 m (5' 2\")   Wt 45.4 kg (100 lb)   SpO2 96%   BMI 18.29 kg/m    Limited visit exam done given COVID-19 precautions.   GENERAL APPEARANCE:  Alert, in no distress, pleasant, cooperative  RESP: no respiratory distress, patient is on room air  M/S:   Gait and station: ambulates without an assistive device, able to move all extremities   SKIN:  Inspection and palpation of skin and subcutaneous tissue: skin warm, dry and intact without rashes but exam is limited  NEURO: no facial asymmetry, no speech deficits, moves all extremities symmetrically  PSYCH:  insight, judgement, and memory impaired, affect and mood normal    Labs:   CBC RESULTS:   Recent Labs   Lab Test 08/23/19  1532   WBC 5.5   RBC 4.75   HGB 15.7   HCT 46.6   MCV 98 "   MCH 33.1*   MCHC 33.7   RDW 13.8          Last Basic Metabolic Panel:  Recent Labs   Lab Test 01/21/20 12/16/19    142   POTASSIUM 3.9 4.3   CHLORIDE 103 102   RAEANN 9.5 9.6   CO2 33* 32*   BUN 19 14   CR 0.84 0.82   GLC 76 103*       Liver Function Studies -   Recent Labs   Lab Test 08/23/19  1527   PROTTOTAL 6.5*   ALBUMIN 3.6   BILITOTAL 0.4   ALKPHOS 69   AST 35   ALT 37       TSH   Date Value Ref Range Status   12/03/2019 1.15 0.30 - 5.00 uIU/mL Final   10/08/2019 0.05 (L) 0.40 - 4.00 mU/L Final       ASSESSMENT/PLAN:  (F03.90) Dementia without behavioral disturbance, unspecified dementia type (H)  (primary encounter diagnosis)  Comment: Slums 18/30 which was completed 9/2019 but she appears to have significant memory impairment so expect the slums to be lower when completed again. She did have weight loss prior to movig to [facility] care but that has now increased and maintaining around 103 lbs up from 96 pounds.   Plan: continue with memory care assisted living for assistance with cares, meals and medications.   --continue to encourage all meals and supplements  --monitor monthly weights     (I47.2) Nonsustained ventricular tachycardia (H)  (I42.9) Cardiomyopathy, unspecified type (H)  Comment: HR have ranged 60-90 but most consistently in the 70s which is well controlled. -140s.  Plan: continue with metoprolol 25 mg daily.   --Continue to monitor blood pressure and heart rate, adjust medications as needed.    Electronically signed by:  MARTY Oviedo CNP     Video-Visit Details  Type of service:  Video Visit  Video End Time (time video stopped): 3:17  Distant Location (provider location):  [facility]             Sincerely,        MARTY Oviedo CNP

## 2020-05-14 NOTE — PROGRESS NOTES
"[facility]   Frances Alexander is being evaluated via a billable video visit due to the restrictions of the Covid-19 pandemic.   The patient has been notified of following:  \"This video visit will be conducted via a call between you and your provider. We have found that certain health care needs can be provided without the need for an in-person physical exam.  This service lets us provide the care you need with a video conversation. If during the course of the call the provider feels a video visit is not appropriate, you will not be charged for this service.\"   The provider has received verbal consent for a Video Visit from the patient or first contact? Yes  Video Start Time: 3:12    [facility] Record Number:  [id number]  Place of Location at the time of visit: [facility]   Chief Complaint   Patient presents with     RECHECK     HPI:  Frances Alexander  is a 84 year old (1935), who is being seen today for a visit.  HPI information obtained from: facility chart records, facility staff, patient report and [facility] chart review.     Ms. Alexander was visited virtually today with the assistance of facility staff. History is limited by progressive dementia but she denies pain. She is unable to follow other questions or communication in part due to decreased ability to hear.    Staff does not have concerns at this time. RN reports that she has been active on the unit and eating in the dining room. There have not been reports of insomnia or agitation.     Past Medical and Surgical History reviewed in Epic today.  MEDICATIONS:    Current Outpatient Medications   Medication Sig Dispense Refill     brimonidine (ALPHAGAN) 0.2 % ophthalmic solution Place 1 drop into both eyes 2 times daily 1 Bottle 3     dorzolamide-timolol PF (COSOPT) 22.3-6.8 MG/ML opthalmic solution 1 drop 2 times daily       latanoprost (XALATAN) 0.005 % ophthalmic solution 1 drop daily       " "levothyroxine (SYNTHROID/LEVOTHROID) 125 MCG tablet TAKE ONE-HALF TABLET (62.5MCG) BY MOUTH ONCE DAILY 14 tablet 97     levothyroxine (SYNTHROID/LEVOTHROID) 50 MCG tablet TAKE 1 TABLET BY MOUTH EVERY MORNING 30 tablet PRN     levothyroxine (SYNTHROID/LEVOTHROID) 75 MCG tablet Take 1 tablet (75 mcg) by mouth daily 31 tablet 3     lisinopril (PRINIVIL/ZESTRIL) 2.5 MG tablet TAKE 1 TABLET BY MOUTH EVERY EVENING 30 tablet 98     lisinopril (PRINIVIL/ZESTRIL) 5 MG tablet Take 1 tablet (5 mg) by mouth every morning 30 tablet 98     metoprolol succinate ER (TOPROL-XL) 25 MG 24 hr tablet Take 1 tablet (25 mg) by mouth daily 16 tablet 11     Nutritional Supplements (NUTRITIONAL SUPPLEMENT PO) Take by mouth daily       polyethylene glycol-propylene glycol (SYSTANE ULTRA) 0.4-0.3 % SOLN ophthalmic solution Place 1 drop into both eyes 2 times daily as needed for dry eyes       vitamin D3 (CHOLECALCIFEROL) 1000 units (25 mcg) tablet Take 1 tablet (1,000 Units) by mouth daily 90 tablet 3     REVIEW OF SYSTEMS: Unobtainable secondary to cognitive impairment.   Objective: /76   Pulse 88   Resp 18   Ht 1.575 m (5' 2\")   Wt 45.4 kg (100 lb)   SpO2 96%   BMI 18.29 kg/m    Limited visit exam done given COVID-19 precautions.   GENERAL APPEARANCE:  Alert, in no distress, pleasant, cooperative  RESP: no respiratory distress, patient is on room air  M/S:   Gait and station: ambulates without an assistive device, able to move all extremities   SKIN:  Inspection and palpation of skin and subcutaneous tissue: skin warm, dry and intact without rashes but exam is limited  NEURO: no facial asymmetry, no speech deficits, moves all extremities symmetrically  PSYCH:  insight, judgement, and memory impaired, affect and mood normal    Labs:   CBC RESULTS:   Recent Labs   Lab Test 08/23/19  1532   WBC 5.5   RBC 4.75   HGB 15.7   HCT 46.6   MCV 98   MCH 33.1*   MCHC 33.7   RDW 13.8          Last Basic Metabolic Panel:  Recent Labs "   Lab Test 01/21/20 12/16/19    142   POTASSIUM 3.9 4.3   CHLORIDE 103 102   RAEANN 9.5 9.6   CO2 33* 32*   BUN 19 14   CR 0.84 0.82   GLC 76 103*       Liver Function Studies -   Recent Labs   Lab Test 08/23/19  1527   PROTTOTAL 6.5*   ALBUMIN 3.6   BILITOTAL 0.4   ALKPHOS 69   AST 35   ALT 37       TSH   Date Value Ref Range Status   12/03/2019 1.15 0.30 - 5.00 uIU/mL Final   10/08/2019 0.05 (L) 0.40 - 4.00 mU/L Final       ASSESSMENT/PLAN:  (F03.90) Dementia without behavioral disturbance, unspecified dementia type (H)  (primary encounter diagnosis)  Comment: Slums 18/30 which was completed 9/2019 but she appears to have significant memory impairment so expect the slums to be lower when completed again. She did have weight loss prior to movig to [facility] care but that has now increased and maintaining around 103 lbs up from 96 pounds.   Plan: continue with [facility] care assisted living for assistance with cares, meals and medications.   --continue to encourage all meals and supplements  --monitor monthly weights     (I47.2) Nonsustained ventricular tachycardia (H)  (I42.9) Cardiomyopathy, unspecified type (H)  Comment: HR have ranged 60-90 but most consistently in the 70s which is well controlled. -140s.  Plan: continue with metoprolol 25 mg daily.   --Continue to monitor blood pressure and heart rate, adjust medications as needed.    Electronically signed by:  MARTY Oviedo CNP     Video-Visit Details  Type of service:  Video Visit  Video End Time (time video stopped): 3:17  Distant Location (provider location):  [facility]

## 2020-05-22 ENCOUNTER — TELEPHONE (OUTPATIENT)
Dept: GERIATRICS | Facility: CLINIC | Age: 85
End: 2020-05-22

## 2020-05-22 NOTE — TELEPHONE ENCOUNTER
Reason for Call:  Form, our goal is to have forms completed with 72 hours, however, some forms may require a visit or additional information.    Type of letter, form or note:  medical    Who is the form from?: PHarm (if other please explain)    Where did the form come from: form was faxed in    What clinic location was the form placed at?: [facility]    Where the form was placed: Given to physician    What number is listed as a contact on the form?: [phone] (P) [phone]       Additional comments: [facility]: refill authorization signature needed    Call taken on 5/22/2020 at 2:32 PM by Deneen Deng

## 2020-06-03 ENCOUNTER — MEDICAL CORRESPONDENCE (OUTPATIENT)
Dept: HEALTH INFORMATION MANAGEMENT | Facility: CLINIC | Age: 85
End: 2020-06-03

## 2020-06-26 ENCOUNTER — VIRTUAL VISIT (OUTPATIENT)
Dept: CARDIOLOGY | Facility: CLINIC | Age: 85
End: 2020-06-26
Payer: COMMERCIAL

## 2020-06-26 VITALS
BODY MASS INDEX: 18.58 KG/M2 | SYSTOLIC BLOOD PRESSURE: 171 MMHG | DIASTOLIC BLOOD PRESSURE: 96 MMHG | OXYGEN SATURATION: 99 % | WEIGHT: 101.6 LBS | HEART RATE: 74 BPM | RESPIRATION RATE: 18 BRPM | TEMPERATURE: 97.6 F

## 2020-06-26 DIAGNOSIS — I10 ESSENTIAL HYPERTENSION: Primary | ICD-10-CM

## 2020-06-26 PROCEDURE — 99213 OFFICE O/P EST LOW 20 MIN: CPT | Mod: 95 | Performed by: PHYSICIAN ASSISTANT

## 2020-06-26 ASSESSMENT — MIFFLIN-ST. JEOR: SCORE: 859.1

## 2020-06-26 NOTE — LETTER
6/26/2020    Bing VEROBharat Jarvis, APRN CNP  [address]  [facility] [address]    RE: Frances Alexander       Dear Colleague,    I had the pleasure of seeing Frances Alexander in the [facility].      Frances Alexander is a 85 year old female who is being evaluated via a billable telephone visit.        I have reviewed and updated the patient's Past Medical History, Social History, Family History and Medication List.    PROBLEM LIST  Patient Active Problem List   Diagnosis     Protein-calorie malnutrition (H)     Vitamin D deficiency     Hypothyroidism     Irregular heart beat     NSVT (nonsustained ventricular tachycardia) (H)     Cardiomyopathy (H)     Essential hypertension       ALLERGIES  No known allergies    MEDICATIONS  Current Outpatient Medications   Medication Sig Dispense Refill     dorzolamide-timolol PF (COSOPT) 22.3-6.8 MG/ML opthalmic solution 1 drop 2 times daily       latanoprost (XALATAN) 0.005 % ophthalmic solution 1 drop daily       levothyroxine (SYNTHROID/LEVOTHROID) 125 MCG tablet TAKE ONE-HALF TABLET (62.5MCG) BY MOUTH ONCE DAILY 14 tablet 97     lisinopril (PRINIVIL/ZESTRIL) 2.5 MG tablet TAKE 1 TABLET BY MOUTH EVERY EVENING 30 tablet 98     lisinopril (PRINIVIL/ZESTRIL) 5 MG tablet Take 1 tablet (5 mg) by mouth every morning 30 tablet 98     metoprolol succinate ER (TOPROL-XL) 25 MG 24 hr tablet Take 1 tablet (25 mg) by mouth daily 16 tablet 11     Nutritional Supplements (NUTRITIONAL SUPPLEMENT PO) Take by mouth daily       vitamin D3 (CHOLECALCIFEROL) 1000 units (25 mcg) tablet Take 1 tablet (1,000 Units) by mouth daily 90 tablet 3     brimonidine (ALPHAGAN) 0.2 % ophthalmic solution Place 1 drop into both eyes 2 times daily 1 Bottle 3     levothyroxine (SYNTHROID/LEVOTHROID) 50 MCG tablet TAKE 1 TABLET BY MOUTH EVERY MORNING (Patient not taking: Reported on 6/26/2020) 30 tablet PRN     levothyroxine (SYNTHROID/LEVOTHROID) 75 MCG tablet Take 1 tablet (75 mcg) by mouth daily  (Patient not taking: Reported on 6/26/2020) 31 tablet 3     polyethylene glycol-propylene glycol (SYSTANE ULTRA) 0.4-0.3 % SOLN ophthalmic solution Place 1 drop into both eyes 2 times daily as needed for dry eyes         Frances Alexander presents for reassessment.    HPI:  The patient has a pertinent cardiac history of the following -   # PSVT, NSVT on zio monitor   # Mildly reduced LVEF  # HTN  # Hypothyroid    In brief, Frances was referred to Dr. Friedman in 9/2019 due to concerns over irregular heartbeat noted by PCP. A 7day zio monitor was performed and showed 813 runs of SVT, up to 17 seconds duration, at 120-170 bpm, as well as one 5-beat run of NSVT. ECHO showed a mildly reduced LVEF ~45%. The IVC appeared normal. A Lexiscan stress test was performed and negative for ischemia. She was additionally hypertensive. Toprol and Lisinopril were initiated.   Of note, her TSH was quite low recently and her synthroid is being adjusted by her facility provider.     Today, I am conducting a visit largely with her RN as she lives in a memory care facility. She tells me that most day her BP is 120's/70's and that this morning's BP is an outlier. She tells me there has been no clinical evidence of heart failure and that her weight has been very consistent ~ 100 lbs. States she doesn't eat much. The patient denies any symptoms to me, although she is very confused. She is looking forward to getting her hair cut today.  Most recent BMP in our system is from January of this year, and stable.  Component      Latest Ref Rng & Units 1/21/2020   Sodium      136 - 145 mmol/L 142   Potassium      3.5 - 5.0 mmol/L 3.9   Chloride      98 - 107 mmol/L 103   Carbon Dioxide      22 - 31 mmol/L 33 (A)   Anion Gap      5 - 18 mmol/L 6   Glucose      70 - 125 mg/dL 76   Urea Nitrogen      8 - 28 mg/dL 19   Creatinine      0.60 - 1.10 mg/dL 0.84   Calcium      8.5 - 10.5 mg/dL 9.5   GFR Estimate      >60 ml/min/1.73m2 >60   GFR Estimate If Black      >60  ml/min/1.73m2 >60       ROS:  12-pt ROS is negative except for as noted above.    Assessment/Plan:  1. Mild CMP - EF ~45%. Suspect hypertensive. Pt/RN deny clinical signs/sxs of decompensated heart failure. Managing conservatively. Continue Toprol 25 and lisinopril 7.5.  2. HTN - controlled per [facility] care facility report.   3. PSVT, NSVT - asymptomatic.  4. Progressive dementia    Follow-up: 1 year with Dr. Friedman, BMP prior.     Charity Acosta PA-C  [facility]  Pager: [phone]  Text Page  (7:30am - 4pm M-F)     Call time: 11 minutes      Thank you for allowing me to participate in the care of your patient.      Sincerely,     Charity Acosta PA-C     [facility]

## 2020-06-26 NOTE — PROGRESS NOTES
"  Frances Alexander is a 85 year old female who is being evaluated via a billable telephone visit.      The patient has been notified of following:     \"This telephone visit will be conducted via a call between you and your physician/provider. We have found that certain health care needs can be provided without the need for a physical exam.  This service lets us provide the care you need with a short phone conversation.  If a prescription is necessary we can send it directly to your pharmacy.  If lab work is needed we can place an order for that and you can then stop by our lab to have the test done at a later time.    Telephone visits are billed at different rates depending on your insurance coverage. During this emergency period, for some insurers they may be billed the same as an in-person visit.  Please reach out to your insurance provider with any questions.    If during the course of the call the physician/provider feels a telephone visit is not appropriate, you will not be charged for this service.\"    Patient has given verbal consent for Telephone visit?  Yes    What phone number would you like to be contacted at? [place] [phone]  [phone]  [phone]    How would you like to obtain your AVS? MyChart    Vital signs reported by patients nurse 6/25/20  BP. 171/96  P. 74  Wt. 101.9 taken 6/26/20  O2.99%  Review Of Systems  Skin: NEGATIVE  Eyes:Ears/Nose/Throat: NEGATIVE  Respiratory: NEGATIVE  Cardiovascular:NEGATIVE  Gastrointestinal: NEGATIVE  Genitourinary:NEGATIVE   Musculoskeletal: NEGATIVE  Neurologic: NEGATIVE  Psychiatric: NEGATIVE  Hematologic/Lymphatic/Immunologic: NEGATIVE  Endocrine:  NEGATIVE  Telephone number of patient: [phone]  Sherrell Lozano Lpn    I have reviewed and updated the patient's Past Medical History, Social History, Family History and Medication List.    PROBLEM LIST  Patient Active Problem List   Diagnosis     Protein-calorie malnutrition (H)     Vitamin D deficiency     " Hypothyroidism     Irregular heart beat     NSVT (nonsustained ventricular tachycardia) (H)     Cardiomyopathy (H)     Essential hypertension       ALLERGIES  No known allergies    MEDICATIONS  Current Outpatient Medications   Medication Sig Dispense Refill     dorzolamide-timolol PF (COSOPT) 22.3-6.8 MG/ML opthalmic solution 1 drop 2 times daily       latanoprost (XALATAN) 0.005 % ophthalmic solution 1 drop daily       levothyroxine (SYNTHROID/LEVOTHROID) 125 MCG tablet TAKE ONE-HALF TABLET (62.5MCG) BY MOUTH ONCE DAILY 14 tablet 97     lisinopril (PRINIVIL/ZESTRIL) 2.5 MG tablet TAKE 1 TABLET BY MOUTH EVERY EVENING 30 tablet 98     lisinopril (PRINIVIL/ZESTRIL) 5 MG tablet Take 1 tablet (5 mg) by mouth every morning 30 tablet 98     metoprolol succinate ER (TOPROL-XL) 25 MG 24 hr tablet Take 1 tablet (25 mg) by mouth daily 16 tablet 11     Nutritional Supplements (NUTRITIONAL SUPPLEMENT PO) Take by mouth daily       vitamin D3 (CHOLECALCIFEROL) 1000 units (25 mcg) tablet Take 1 tablet (1,000 Units) by mouth daily 90 tablet 3     brimonidine (ALPHAGAN) 0.2 % ophthalmic solution Place 1 drop into both eyes 2 times daily 1 Bottle 3     levothyroxine (SYNTHROID/LEVOTHROID) 50 MCG tablet TAKE 1 TABLET BY MOUTH EVERY MORNING (Patient not taking: Reported on 6/26/2020) 30 tablet PRN     levothyroxine (SYNTHROID/LEVOTHROID) 75 MCG tablet Take 1 tablet (75 mcg) by mouth daily (Patient not taking: Reported on 6/26/2020) 31 tablet 3     polyethylene glycol-propylene glycol (SYSTANE ULTRA) 0.4-0.3 % SOLN ophthalmic solution Place 1 drop into both eyes 2 times daily as needed for dry eyes         Frances Alexander presents for reassessment.    HPI:  The patient has a pertinent cardiac history of the following -   # PSVT, NSVT on zio monitor   # Mildly reduced LVEF  # HTN  # Hypothyroid    In brief, Frances was referred to Dr. Friedman in 9/2019 due to concerns over irregular heartbeat noted by PCP. A 7day zio monitor was performed and  showed 813 runs of SVT, up to 17 seconds duration, at 120-170 bpm, as well as one 5-beat run of NSVT. ECHO showed a mildly reduced LVEF ~45%. The IVC appeared normal. A Lexiscan stress test was performed and negative for ischemia. She was additionally hypertensive. Toprol and Lisinopril were initiated.   Of note, her TSH was quite low recently and her synthroid is being adjusted by her facility provider.     Today, I am conducting a visit largely with her RN as she lives in a memory care facility. She tells me that most day her BP is 120's/70's and that this morning's BP is an outlier. She tells me there has been no clinical evidence of heart failure and that her weight has been very consistent ~ 100 lbs. States she doesn't eat much. The patient denies any symptoms to me, although she is very confused. She is looking forward to getting her hair cut today.  Most recent BMP in our system is from January of this year, and stable.  Component      Latest Ref Rng & Units 1/21/2020   Sodium      136 - 145 mmol/L 142   Potassium      3.5 - 5.0 mmol/L 3.9   Chloride      98 - 107 mmol/L 103   Carbon Dioxide      22 - 31 mmol/L 33 (A)   Anion Gap      5 - 18 mmol/L 6   Glucose      70 - 125 mg/dL 76   Urea Nitrogen      8 - 28 mg/dL 19   Creatinine      0.60 - 1.10 mg/dL 0.84   Calcium      8.5 - 10.5 mg/dL 9.5   GFR Estimate      >60 ml/min/1.73m2 >60   GFR Estimate If Black      >60 ml/min/1.73m2 >60       ROS:  12-pt ROS is negative except for as noted above.    Assessment/Plan:  1. Mild CMP - EF ~45%. Suspect hypertensive. Pt/RN deny clinical signs/sxs of decompensated heart failure. Managing conservatively. Continue Toprol 25 and lisinopril 7.5.  2. HTN - controlled per memory care facility report.   3. PSVT, NSVT - asymptomatic.  4. Progressive dementia    Follow-up: 1 year with Dr. Friedman, BMP prior.     Charity Acosta PA-C  [facility] - [facility]  Pager: [phone]  Text Page  (7:30am - 4pm M-F)     Call  time: 11 minutes

## 2020-07-09 ENCOUNTER — RECORDS - HEALTHEAST (OUTPATIENT)
Dept: LAB | Facility: CLINIC | Age: 85
End: 2020-07-09

## 2020-07-17 LAB
SARS-COV-2 BY NAA - HISTORICAL: NOT DETECTED
SARS-COV-2 SOURCE - HISTORICAL: NORMAL

## 2020-07-20 ENCOUNTER — RECORDS - HEALTHEAST (OUTPATIENT)
Dept: LAB | Facility: CLINIC | Age: 85
End: 2020-07-20

## 2020-07-21 ENCOUNTER — TRANSFERRED RECORDS (OUTPATIENT)
Dept: HEALTH INFORMATION MANAGEMENT | Facility: CLINIC | Age: 85
End: 2020-07-21

## 2020-07-21 LAB
ANION GAP SERPL CALCULATED.3IONS-SCNC: 10 MMOL/L (ref 5–18)
ANION GAP SERPL CALCULATED.3IONS-SCNC: 10 MMOL/L (ref 5–18)
BUN SERPL-MCNC: 16 MG/DL (ref 8–28)
BUN SERPL-MCNC: 16 MG/DL (ref 8–28)
CALCIUM SERPL-MCNC: 9.7 MG/DL (ref 8.5–10.5)
CALCIUM SERPL-MCNC: 9.7 MG/DL (ref 8.5–10.5)
CHLORIDE BLD-SCNC: 101 MMOL/L (ref 98–107)
CHLORIDE SERPLBLD-SCNC: 101 MMOL/L (ref 98–107)
CO2 SERPL-SCNC: 30 MMOL/L (ref 22–31)
CO2 SERPL-SCNC: 30 MMOL/L (ref 22–31)
CREAT SERPL-MCNC: 0.84 MG/DL (ref 0.6–1.1)
CREAT SERPL-MCNC: 0.84 MG/DL (ref 0.6–1.1)
ERYTHROCYTE [DISTWIDTH] IN BLOOD BY AUTOMATED COUNT: 13.5 % (ref 11–14.5)
ERYTHROCYTE [DISTWIDTH] IN BLOOD BY AUTOMATED COUNT: 13.5 % (ref 11–14.5)
GFR SERPL CREATININE-BSD FRML MDRD: >60 ML/MIN/1.73M2
GFR SERPL CREATININE-BSD FRML MDRD: >60 ML/MIN/1.73M2
GLUCOSE BLD-MCNC: 67 MG/DL (ref 70–125)
GLUCOSE SERPL-MCNC: 67 MG/DL (ref 70–125)
HCT VFR BLD AUTO: 48.4 % (ref 35–47)
HCT VFR BLD AUTO: 48.4 % (ref 35–47)
HEMOGLOBIN: 15.1 G/DL (ref 12–16)
HGB BLD-MCNC: 15.1 G/DL (ref 12–16)
MCH RBC QN AUTO: 31.4 PG (ref 27–34)
MCH RBC QN AUTO: 31.4 PG (ref 27–34)
MCHC RBC AUTO-ENTMCNC: 31.2 G/DL (ref 32–36)
MCHC RBC AUTO-ENTMCNC: 31.2 G/DL (ref 32–36)
MCV RBC AUTO: 101 FL (ref 80–100)
MCV RBC AUTO: 101 FL (ref 80–100)
PLATELET # BLD AUTO: 201 THOU/UL (ref 140–440)
PLATELET # BLD AUTO: 201 THOU/UL (ref 140–440)
PMV BLD AUTO: 11.2 FL (ref 8.5–12.5)
POTASSIUM BLD-SCNC: 3.6 MMOL/L (ref 3.5–5)
POTASSIUM SERPL-SCNC: 3.6 MMOL/L (ref 3.5–5)
RBC # BLD AUTO: 4.81 MILL/UL (ref 3.8–5.4)
RBC # BLD AUTO: 4.81 MILL/UL (ref 3.8–5.4)
SODIUM SERPL-SCNC: 141 MMOL/L (ref 136–145)
SODIUM SERPL-SCNC: 141 MMOL/L (ref 136–145)
TSH SERPL DL<=0.005 MIU/L-ACNC: 1.82 UIU/ML (ref 0.3–5)
TSH SERPL-ACNC: 1.82 UIU/ML (ref 0.3–5)
WBC # BLD AUTO: 5.7 THOU/UL (ref 4–11)
WBC: 5.7 THOU/UL (ref 4–11)

## 2020-07-24 ENCOUNTER — ASSISTED LIVING VISIT (OUTPATIENT)
Dept: GERIATRICS | Facility: CLINIC | Age: 85
End: 2020-07-24
Payer: COMMERCIAL

## 2020-07-24 VITALS — BODY MASS INDEX: 18.69 KG/M2 | WEIGHT: 101.6 LBS | OXYGEN SATURATION: 92 % | TEMPERATURE: 97.8 F | HEIGHT: 62 IN

## 2020-07-24 DIAGNOSIS — H40.002 GLAUCOMA SUSPECT, LEFT: ICD-10-CM

## 2020-07-24 DIAGNOSIS — Z01.818 PRE-OP EXAM: Primary | ICD-10-CM

## 2020-07-24 NOTE — PROGRESS NOTES
[facility]  [place] Medical Record Number:  [id number]  Place of Service where encounter took place:  [facility] ([facility]) [[id number]]  Chief Complaint   Patient presents with     RECHECK       HPI:    Frances Alexander  is a 85 year old (1935), who is being seen today for an episodic care visit.  HPI information obtained from: {FGS HPI:852663}. Today's concern is:  {FGS DX:189366}    Past Medical and Surgical History reviewed in Epic today.    MEDICATIONS:  {Providers Please double check the med list (in the plan section >> meds & orders tab) and Discontinue any of the meds flagged by the TC to be discontinued}  Current Outpatient Medications   Medication Sig Dispense Refill     brimonidine (ALPHAGAN) 0.2 % ophthalmic solution Place 1 drop into both eyes 2 times daily 1 Bottle 3     dorzolamide-timolol PF (COSOPT) 22.3-6.8 MG/ML opthalmic solution 1 drop 2 times daily       latanoprost (XALATAN) 0.005 % ophthalmic solution 1 drop daily       levothyroxine (SYNTHROID/LEVOTHROID) 125 MCG tablet TAKE ONE-HALF TABLET (62.5MCG) BY MOUTH ONCE DAILY 14 tablet 97     levothyroxine (SYNTHROID/LEVOTHROID) 50 MCG tablet TAKE 1 TABLET BY MOUTH EVERY MORNING (Patient not taking: Reported on 6/26/2020) 30 tablet PRN     levothyroxine (SYNTHROID/LEVOTHROID) 75 MCG tablet Take 1 tablet (75 mcg) by mouth daily (Patient not taking: Reported on 6/26/2020) 31 tablet 3     lisinopril (PRINIVIL/ZESTRIL) 2.5 MG tablet TAKE 1 TABLET BY MOUTH EVERY EVENING 30 tablet 98     lisinopril (PRINIVIL/ZESTRIL) 5 MG tablet Take 1 tablet (5 mg) by mouth every morning 30 tablet 98     metoprolol succinate ER (TOPROL-XL) 25 MG 24 hr tablet Take 1 tablet (25 mg) by mouth daily 16 tablet 11     Nutritional Supplements (NUTRITIONAL SUPPLEMENT PO) Take by mouth daily       polyethylene glycol-propylene glycol (SYSTANE ULTRA) 0.4-0.3 % SOLN ophthalmic solution Place 1 drop into both eyes 2 times daily  as needed for dry eyes       vitamin D3 (CHOLECALCIFEROL) 1000 units (25 mcg) tablet Take 1 tablet (1,000 Units) by mouth daily 90 tablet 3     ***    REVIEW OF SYSTEMS:  {ROS FGS:864327}    Objective:  There were no vitals taken for this visit.  Exam:  {FDC physical exam :863428}    Labs:   {fgslab:797811}    ASSESSMENT/PLAN:  {FGS DX:561881}    {fgsorders:136727}  ***    {fgstime1:964570}  Electronically signed by:  Kristy Yan MA ***  {Providers Please double check the med list (in the plan section >> meds & orders tab) and Discontinue any of the meds flagged by the TC to be discontinued}

## 2020-07-24 NOTE — PROGRESS NOTES
PRE-OP EVALUATION:    [place] Medical Record Number:  [id number]  Place of Service where encounter took place:  [facility] ([facility]) [[id number]]  Today's date: 2020    [facility]  [address]  [facility] [address]  [place] [address]  [phone]  Dept: [phone]    PRE-OP EVALUATION:  Today's date: 2020    Frances Alexander (: 1935) presents for pre-operative evaluation assessment as requested by Dr. Arango.  She requires evaluation and anesthesia risk assessment prior to undergoing surgery/procedure for treatment of glaucoma .    Proposed Surgery/ Procedure: Trabeculectomy of L eye  Date of Surgery/ Procedure: 2020  Time of Surgery/ Procedure: 1315  Hospital/Surgical Facility: [facility]  Fax number for surgical facility: [phone]  Primary Physician: Bing Jarvis  Type of Anesthesia Anticipated: conscious sedation    Patient has a Health Care Directive or Living Will:  NO    Patient is unable to answer questions due to advanced dementia and son Frank is unavailable so questions were answered based upon care that is provided at assisted living and what is seen on exam, and chart review.   No - Have you ever had a heart attack or stroke?  No - Have you ever had surgery on your heart or blood vessels, such as a stent, coronary (heart) bypass, or surgery on an artery in the head, neck, heart, or legs?  No - Do you have chest pain when you are physically active?  YES - Do you have a history of heart failure? Mild systolic heart failure  No - Do you currently have a cold, bronchitis, or symptoms of other respiratory (head and chest) infections?  No - Do you have a cough, shortness of breath, or wheezing?  Unknown - Do you or anyone in your family have a history of blood clots?  Unknown - Do you or anyone in your family have a serious bleeding problem, such as long-lasting bleeding after surgeries or cuts?  No -  Have you ever had anemia or been told to take iron pills?  Not noted by staff - Have you had any abnormal blood loss such as black, tarry or bloody stools, or abnormal vaginal bleeding?  Unknown - Have you ever had a blood transfusion?  Yes - Are you willing to have a blood transfusion if it is medically needed before, during, or after your surgery?  Unknown - Have you or anyone in your family ever had problems with anesthesia (sedation for surgery)?  No - Do you have sleep apnea, excessive snoring, or daytime drowsiness?   No - Do you have any artifical heart valves or other implanted medical devices, such as a pacemaker, defibrillator, or continuous glucose monitor?  No - Do you have any artifical joints?  No - Are you allergic to latex?  No - Is there any chance that you may be pregnant?      HPI:     HPI related to upcoming procedure: Patient is currently followed by opthamologist and recent went for an appointment where the pressure in the eye was elevated so she will undergo a procedure to relieve that pressure. Unable to obtain history from patient due to advanced dementia. She was visited today in her memory care. She denies pain or discomfort and is dressed appropriately. Staff does not have concerns at this time.     HYPERTENSION - Patient has longstanding history of HTN. She appears comfortable without noteable chest pain, dyspnea, orthopnea, or peripheral edema. Blood pressure readings have been in normal range. Current medication regimen is as listed below.     HYPOTHYROIDISM - Patient has a longstanding history of chronic Hypothyroidism. Patient has been doing well, noting no tremor, insomnia, hair loss or changes in skin texture. Continues to take medications as directed, without adverse reactions or side effects. Last TSH   Lab Results   Component Value Date    TSH 1.82 07/21/2020   .     MEDICAL HISTORY:     Patient Active Problem List    Diagnosis Date Noted     Essential hypertension       Priority: Medium     NSVT (nonsustained ventricular tachycardia) (H)      Priority: Medium     Cardiomyopathy (H)      Priority: Medium     mild, EF 46%        Vitamin D deficiency 08/30/2019     Priority: Medium     Hypothyroidism 08/30/2019     Priority: Medium     Irregular heart beat 08/30/2019     Priority: Medium     Protein-calorie malnutrition (H) 08/23/2019     Priority: Medium      Past Medical History:   Diagnosis Date     Abnormal electrocardiogram 08/28/2019     Abnormal weight loss 10/24/2019     Breast cancer (H) 10/27/2019     Cardiomyopathy (H)     mild, EF 46%      Chronic systolic heart failure (H) 10/27/2019     Dementia (H) 10/27/2019     Essential hypertension 08/28/2019     Glaucoma 08/28/2019     Hypothyroidism 8/30/2019     Irregular heart beat 8/30/2019     Left upper quadrant abdominal swelling, mass and lump 08/28/2019     Moderate protein-calorie malnutrition (H) 08/28/2019     NSVT (nonsustained ventricular tachycardia) (H) 10/27/2019     Other specified diseases of liver 10/27/2019     Vitamin D deficiency 8/30/2019     Past Surgical History:   Procedure Laterality Date     MASTECTOMY Right      Current Outpatient Medications   Medication Sig Dispense Refill     brimonidine (ALPHAGAN) 0.2 % ophthalmic solution Place 1 drop into both eyes 2 times daily 1 Bottle 3     dorzolamide-timolol PF (COSOPT) 22.3-6.8 MG/ML opthalmic solution 1 drop 2 times daily       latanoprost (XALATAN) 0.005 % ophthalmic solution 1 drop daily       levothyroxine (SYNTHROID/LEVOTHROID) 125 MCG tablet TAKE ONE-HALF TABLET (62.5MCG) BY MOUTH ONCE DAILY 14 tablet 97     levothyroxine (SYNTHROID/LEVOTHROID) 50 MCG tablet TAKE 1 TABLET BY MOUTH EVERY MORNING (Patient not taking: Reported on 6/26/2020) 30 tablet PRN     levothyroxine (SYNTHROID/LEVOTHROID) 75 MCG tablet Take 1 tablet (75 mcg) by mouth daily (Patient not taking: Reported on 6/26/2020) 31 tablet 3     lisinopril (PRINIVIL/ZESTRIL) 2.5 MG tablet TAKE 1  "TABLET BY MOUTH EVERY EVENING 30 tablet 98     lisinopril (PRINIVIL/ZESTRIL) 5 MG tablet Take 1 tablet (5 mg) by mouth every morning 30 tablet 98     metoprolol succinate ER (TOPROL-XL) 25 MG 24 hr tablet Take 1 tablet (25 mg) by mouth daily 16 tablet 11     Nutritional Supplements (NUTRITIONAL SUPPLEMENT PO) Take by mouth daily       polyethylene glycol-propylene glycol (SYSTANE ULTRA) 0.4-0.3 % SOLN ophthalmic solution Place 1 drop into both eyes 2 times daily as needed for dry eyes       vitamin D3 (CHOLECALCIFEROL) 1000 units (25 mcg) tablet Take 1 tablet (1,000 Units) by mouth daily 90 tablet 3     OTC products: None, except as noted above      Allergies   Allergen Reactions     No Known Allergies       Latex Allergy: NO    Social History     Tobacco Use     Smoking status: Never Smoker     Smokeless tobacco: Never Used   Substance Use Topics     Alcohol use: Not Currently     Alcohol/week: 1.0 standard drinks     Types: 1 Glasses of wine per week     Frequency: 4 or more times a week     Drinks per session: 1 or 2     Binge frequency: Never     History   Drug Use Unknown       REVIEW OF SYSTEMS:   Unobtainable secondary to cognitive impairment.     EXAM:   Temp 97.8  F (36.6  C)   Ht 1.575 m (5' 2\")   Wt 46.1 kg (101 lb 9.6 oz)   SpO2 92%   BMI 18.58 kg/m    GENERAL APPEARANCE:  Alert, in no distress, pleasant, cooperative  RESP: no respiratory distress, patient is on room air  CV: . Edema none in bilateral lower extremities.  M/S:   Gait and station: ambulates without the use of an, able to move all extremities   SKIN:  Inspection and palpation of skin and subcutaneous tissue: skin warm, dry and intact without rashes but exam is limited  NEURO: no facial asymmetry, no speech deficits, moves all extremities symmetrically  PSYCH:  insight, judgement, and memory impaired, affect and mood normal    DIAGNOSTICS:   No labs or EKG required for low risk surgery (cataract, skin procedure, breast biopsy, " etc)    Recent Labs   Lab Test 07/21/20 01/21/20 08/23/19  1532   HGB 15.1  --   --  15.7     --   --  162    142   < >  --    POTASSIUM 3.6 3.9   < >  --    CR 0.84 0.84   < >  --     < > = values in this interval not displayed.        IMPRESSION:   Reason for surgery/procedure: to relieve the pressure caused by glaucoma  Diagnosis/reason for consult: glaucoma    The proposed surgical procedure is considered LOW risk.    REVISED CARDIAC RISK INDEX  The patient has the following serious cardiovascular risks for perioperative complications such as (MI, PE, VFib and 3  AV Block):  No serious cardiac risks  INTERPRETATION: 0 risks: Class I (very low risk - 0.4% complication rate)    The patient has the following additional risks for perioperative complications:  Delirium or Dementia      ICD-10-CM    1. Pre-op exam  Z01.818    2. Glaucoma suspect, left  H40.002        ORDERS:     --Patient is to take all scheduled medications on the day of surgery EXCEPT for modifications listed below.    APPROVAL GIVEN to proceed with proposed procedure, without further diagnostic evaluation       Signed Electronically by: MARTY Oviedo CNP    Copy of this evaluation report is provided to requesting physician.

## 2020-07-24 NOTE — LETTER
2020        RE: Frances Alexander  [facility]  [address]  [facility] [address]        PRE-OP EVALUATION:    [place] Medical Record Number:  [id number]  Place of Service where encounter took place:  [facility] ([facility]) [[id number]]  Today's date: 2020    [facility]  [address]    [place] [address]  [phone]  Dept: [phone]    PRE-OP EVALUATION:  Today's date: 2020    Frances Alexander (: 1935) presents for pre-operative evaluation assessment as requested by Dr. Arango.  She requires evaluation and anesthesia risk assessment prior to undergoing surgery/procedure for treatment of glaucoma .    Proposed Surgery/ Procedure: Trabeculectomy of L eye  Date of Surgery/ Procedure: 2020  Time of Surgery/ Procedure: 1315  Hospital/Surgical Facility: [facility]  Fax number for surgical facility: [phone]  Primary Physician: Bing Jarvis  Type of Anesthesia Anticipated: conscious sedation    Patient has a Health Care Directive or Living Will:  NO    Patient is unable to answer questions due to advanced dementia and son Frank is unavailable so questions were answered based upon care that is provided at assisted living and what is seen on exam, and chart review.   No - Have you ever had a heart attack or stroke?  No - Have you ever had surgery on your heart or blood vessels, such as a stent, coronary (heart) bypass, or surgery on an artery in the head, neck, heart, or legs?  No - Do you have chest pain when you are physically active?  YES - Do you have a history of heart failure? Mild systolic heart failure  No - Do you currently have a cold, bronchitis, or symptoms of other respiratory (head and chest) infections?  No - Do you have a cough, shortness of breath, or wheezing?  Unknown - Do you or anyone in your family have a history of blood clots?  Unknown - Do you or anyone in  your family have a serious bleeding problem, such as long-lasting bleeding after surgeries or cuts?  No - Have you ever had anemia or been told to take iron pills?  Not noted by staff - Have you had any abnormal blood loss such as black, tarry or bloody stools, or abnormal vaginal bleeding?  Unknown - Have you ever had a blood transfusion?  Yes - Are you willing to have a blood transfusion if it is medically needed before, during, or after your surgery?  Unknown - Have you or anyone in your family ever had problems with anesthesia (sedation for surgery)?  No - Do you have sleep apnea, excessive snoring, or daytime drowsiness?   No - Do you have any artifical heart valves or other implanted medical devices, such as a pacemaker, defibrillator, or continuous glucose monitor?  No - Do you have any artifical joints?  No - Are you allergic to latex?  No - Is there any chance that you may be pregnant?      HPI:     HPI related to upcoming procedure: Patient is currently followed by opthamologist and recent went for an appointment where the pressure in the eye was elevated so she will undergo a procedure to relieve that pressure. Unable to obtain history from patient due to advanced dementia. She was visited today in her memory care. She denies pain or discomfort and is dressed appropriately. Staff does not have concerns at this time.     HYPERTENSION - Patient has longstanding history of HTN. She appears comfortable without noteable chest pain, dyspnea, orthopnea, or peripheral edema. Blood pressure readings have been in normal range. Current medication regimen is as listed below.     HYPOTHYROIDISM - Patient has a longstanding history of chronic Hypothyroidism. Patient has been doing well, noting no tremor, insomnia, hair loss or changes in skin texture. Continues to take medications as directed, without adverse reactions or side effects. Last TSH   Lab Results   Component Value Date    TSH 1.82 07/21/2020   .     MEDICAL  HISTORY:     Patient Active Problem List    Diagnosis Date Noted     Essential hypertension      Priority: Medium     NSVT (nonsustained ventricular tachycardia) (H)      Priority: Medium     Cardiomyopathy (H)      Priority: Medium     mild, EF 46%        Vitamin D deficiency 08/30/2019     Priority: Medium     Hypothyroidism 08/30/2019     Priority: Medium     Irregular heart beat 08/30/2019     Priority: Medium     Protein-calorie malnutrition (H) 08/23/2019     Priority: Medium      Past Medical History:   Diagnosis Date     Abnormal electrocardiogram 08/28/2019     Abnormal weight loss 10/24/2019     Breast cancer (H) 10/27/2019     Cardiomyopathy (H)     mild, EF 46%      Chronic systolic heart failure (H) 10/27/2019     Dementia (H) 10/27/2019     Essential hypertension 08/28/2019     Glaucoma 08/28/2019     Hypothyroidism 8/30/2019     Irregular heart beat 8/30/2019     Left upper quadrant abdominal swelling, mass and lump 08/28/2019     Moderate protein-calorie malnutrition (H) 08/28/2019     NSVT (nonsustained ventricular tachycardia) (H) 10/27/2019     Other specified diseases of liver 10/27/2019     Vitamin D deficiency 8/30/2019     Past Surgical History:   Procedure Laterality Date     MASTECTOMY Right      Current Outpatient Medications   Medication Sig Dispense Refill     brimonidine (ALPHAGAN) 0.2 % ophthalmic solution Place 1 drop into both eyes 2 times daily 1 Bottle 3     dorzolamide-timolol PF (COSOPT) 22.3-6.8 MG/ML opthalmic solution 1 drop 2 times daily       latanoprost (XALATAN) 0.005 % ophthalmic solution 1 drop daily       levothyroxine (SYNTHROID/LEVOTHROID) 125 MCG tablet TAKE ONE-HALF TABLET (62.5MCG) BY MOUTH ONCE DAILY 14 tablet 97     levothyroxine (SYNTHROID/LEVOTHROID) 50 MCG tablet TAKE 1 TABLET BY MOUTH EVERY MORNING (Patient not taking: Reported on 6/26/2020) 30 tablet PRN     levothyroxine (SYNTHROID/LEVOTHROID) 75 MCG tablet Take 1 tablet (75 mcg) by mouth daily (Patient not  "taking: Reported on 6/26/2020) 31 tablet 3     lisinopril (PRINIVIL/ZESTRIL) 2.5 MG tablet TAKE 1 TABLET BY MOUTH EVERY EVENING 30 tablet 98     lisinopril (PRINIVIL/ZESTRIL) 5 MG tablet Take 1 tablet (5 mg) by mouth every morning 30 tablet 98     metoprolol succinate ER (TOPROL-XL) 25 MG 24 hr tablet Take 1 tablet (25 mg) by mouth daily 16 tablet 11     Nutritional Supplements (NUTRITIONAL SUPPLEMENT PO) Take by mouth daily       polyethylene glycol-propylene glycol (SYSTANE ULTRA) 0.4-0.3 % SOLN ophthalmic solution Place 1 drop into both eyes 2 times daily as needed for dry eyes       vitamin D3 (CHOLECALCIFEROL) 1000 units (25 mcg) tablet Take 1 tablet (1,000 Units) by mouth daily 90 tablet 3     OTC products: None, except as noted above      Allergies   Allergen Reactions     No Known Allergies       Latex Allergy: NO    Social History     Tobacco Use     Smoking status: Never Smoker     Smokeless tobacco: Never Used   Substance Use Topics     Alcohol use: Not Currently     Alcohol/week: 1.0 standard drinks     Types: 1 Glasses of wine per week     Frequency: 4 or more times a week     Drinks per session: 1 or 2     Binge frequency: Never     History   Drug Use Unknown       REVIEW OF SYSTEMS:   Unobtainable secondary to cognitive impairment.     EXAM:   Temp 97.8  F (36.6  C)   Ht 1.575 m (5' 2\")   Wt 46.1 kg (101 lb 9.6 oz)   SpO2 92%   BMI 18.58 kg/m    GENERAL APPEARANCE:  Alert, in no distress, pleasant, cooperative  RESP: no respiratory distress, patient is on room air  CV: . Edema none in bilateral lower extremities.  M/S:   Gait and station: ambulates without the use of an, able to move all extremities   SKIN:  Inspection and palpation of skin and subcutaneous tissue: skin warm, dry and intact without rashes but exam is limited  NEURO: no facial asymmetry, no speech deficits, moves all extremities symmetrically  PSYCH:  insight, judgement, and memory impaired, affect and mood normal    DIAGNOSTICS: "   No labs or EKG required for low risk surgery (cataract, skin procedure, breast biopsy, etc)    Recent Labs   Lab Test 07/21/20 01/21/20 08/23/19  1532   HGB 15.1  --   --  15.7     --   --  162    142   < >  --    POTASSIUM 3.6 3.9   < >  --    CR 0.84 0.84   < >  --     < > = values in this interval not displayed.        IMPRESSION:   Reason for surgery/procedure: to relieve the pressure caused by glaucoma  Diagnosis/reason for consult: glaucoma    The proposed surgical procedure is considered LOW risk.    REVISED CARDIAC RISK INDEX  The patient has the following serious cardiovascular risks for perioperative complications such as (MI, PE, VFib and 3  AV Block):  No serious cardiac risks  INTERPRETATION: 0 risks: Class I (very low risk - 0.4% complication rate)    The patient has the following additional risks for perioperative complications:  Delirium or Dementia      ICD-10-CM    1. Pre-op exam  Z01.818    2. Glaucoma suspect, left  H40.002        ORDERS:     --Patient is to take all scheduled medications on the day of surgery EXCEPT for modifications listed below.    APPROVAL GIVEN to proceed with proposed procedure, without further diagnostic evaluation       Signed Electronically by: MARTY Oviedo CNP    Copy of this evaluation report is provided to requesting physician.            Sincerely,        MARTY Oviedo CNP     Detail Level: Detailed

## 2020-07-25 LAB
SARS-COV-2 BY NAA - HISTORICAL: NOT DETECTED
SARS-COV-2 SOURCE - HISTORICAL: NORMAL

## 2020-08-04 ENCOUNTER — RECORDS - HEALTHEAST (OUTPATIENT)
Dept: LAB | Facility: CLINIC | Age: 85
End: 2020-08-04

## 2020-08-07 LAB
SARS-COV-2 BY NAA - HISTORICAL: NOT DETECTED
SARS-COV-2 SOURCE - HISTORICAL: NORMAL

## 2020-08-17 DIAGNOSIS — H40.002 GLAUCOMA SUSPECT, LEFT: Primary | ICD-10-CM

## 2020-08-17 RX ORDER — POLYETHYLENE GLYCOL, PROPYLENE GLYCOL .4; .3 G/100ML; G/100ML
LIQUID OPHTHALMIC
Qty: 15 ML | Refills: 97 | Status: SHIPPED | OUTPATIENT
Start: 2020-08-17

## 2020-08-31 ENCOUNTER — TELEPHONE (OUTPATIENT)
Dept: FAMILY MEDICINE | Facility: CLINIC | Age: 85
End: 2020-08-31

## 2020-08-31 ENCOUNTER — TELEPHONE (OUTPATIENT)
Dept: GERIATRICS | Facility: CLINIC | Age: 85
End: 2020-08-31

## 2020-08-31 NOTE — TELEPHONE ENCOUNTER
Reason for Call:  Form, our goal is to have forms completed with 72 hours, however, some forms may require a visit or additional information.    Type of letter, form or note:  medical    Who is the form from?: Pharm (if other please explain)    Where did the form come from: form was faxed in    What clinic location was the form placed at?: [facility]    Where the form was placed: DOF    What number is listed as a contact on the form?: [phone] (P) [phone]       Additional comments: [facility]: Vitamin D3    Call taken on 8/31/2020 at 9:23 AM by Deneen Deng

## 2020-10-09 NOTE — TELEPHONE ENCOUNTER
Discussed with Lalitha -     Abdominal CT shows a large left liver lobe, but does not show anything concerning for malignancy.     No further imaging required at this time.     Martín Kohli PA-C    
Dry/Warm

## 2020-10-24 ASSESSMENT — MIFFLIN-ST. JEOR: SCORE: 856.38

## 2020-11-10 ENCOUNTER — ASSISTED LIVING VISIT (OUTPATIENT)
Dept: GERIATRICS | Facility: CLINIC | Age: 85
End: 2020-11-10
Payer: COMMERCIAL

## 2020-11-10 VITALS
DIASTOLIC BLOOD PRESSURE: 76 MMHG | HEART RATE: 74 BPM | BODY MASS INDEX: 18.58 KG/M2 | HEIGHT: 62 IN | WEIGHT: 101 LBS | TEMPERATURE: 97.9 F | RESPIRATION RATE: 18 BRPM | SYSTOLIC BLOOD PRESSURE: 134 MMHG | OXYGEN SATURATION: 96 %

## 2020-11-10 DIAGNOSIS — F02.80 LATE ONSET ALZHEIMER'S DISEASE WITHOUT BEHAVIORAL DISTURBANCE (H): ICD-10-CM

## 2020-11-10 DIAGNOSIS — G30.1 LATE ONSET ALZHEIMER'S DISEASE WITHOUT BEHAVIORAL DISTURBANCE (H): ICD-10-CM

## 2020-11-10 DIAGNOSIS — I42.9 CARDIOMYOPATHY, UNSPECIFIED TYPE (H): ICD-10-CM

## 2020-11-10 DIAGNOSIS — E03.9 HYPOTHYROIDISM, UNSPECIFIED TYPE: ICD-10-CM

## 2020-11-10 DIAGNOSIS — I10 BENIGN ESSENTIAL HYPERTENSION: ICD-10-CM

## 2020-11-10 DIAGNOSIS — I47.10 SVT (SUPRAVENTRICULAR TACHYCARDIA) (H): Primary | ICD-10-CM

## 2020-11-10 NOTE — PROGRESS NOTES
[place] [facility]  [place] Medical Record Number:  [id number]  Place of Service where encounter took place:  [facility] ([facility] [[id number]]  Chief Complaint   Patient presents with     RECHECK       HPI:    Frances Alexander  is a 84 year old (1935), who is being seen today for an episodic care visit.  HPI information obtained from: facility chart records, facility staff, patient report, [facility] chart review and Care Everywhere [facility] chart review.     Ms. Alexander was visited today while in the community area. Dementia has advanced so it is difficult to have conversation. She speaks well but it is nonsensical. Denies pain. Staff does not have concerns at this time.     Past Medical and Surgical History reviewed in Epic today.    MEDICATIONS:  Current Outpatient Medications   Medication Sig Dispense Refill     lisinopril (ZESTRIL) 5 MG tablet Take 1 tablet (5 mg) by mouth every evening 30 tablet 98     brimonidine (ALPHAGAN) 0.2 % ophthalmic solution Place 1 drop into both eyes 2 times daily 1 Bottle 3     dorzolamide-timolol PF (COSOPT) 22.3-6.8 MG/ML opthalmic solution 1 drop 2 times daily       latanoprost (XALATAN) 0.005 % ophthalmic solution 1 drop daily       levothyroxine (SYNTHROID/LEVOTHROID) 125 MCG tablet TAKE ONE-HALF TABLET (62.5MCG) BY MOUTH ONCE DAILY 14 tablet 97     lisinopril (PRINIVIL/ZESTRIL) 2.5 MG tablet TAKE 1 TABLET BY MOUTH EVERY EVENING 30 tablet 98     LUBRICATING EYE DROPS 0.4-0.3 % SOLN ophthalmic solution PLACE DROP(S) INTO AFFECTED EYE(S) 2 TO 4 TIMES DAILY AS NEEDED FOR DRYNESS 15 mL 97     metoprolol succinate ER (TOPROL-XL) 25 MG 24 hr tablet Take 1 tablet (25 mg) by mouth daily 16 tablet 11     Nutritional Supplements (NUTRITIONAL SUPPLEMENT PO) Take by mouth daily       vitamin D3 (CHOLECALCIFEROL) 1000 units (25 mcg) tablet Take 1 tablet (1,000 Units) by mouth daily 90 tablet 3       REVIEW OF SYSTEMS:  Limited secondary to  "cognitive impairment but today pt reports no pain    Objective:  /76   Pulse 74   Temp 97.9  F (36.6  C)   Resp 18   Ht 1.575 m (5' 2\")   Wt 45.8 kg (101 lb)   SpO2 96%   BMI 18.47 kg/m    Exam:  GENERAL APPEARANCE:  Alert, in no distress, pleasant, cooperative  RESP: no respiratory distress, Lung sounds clear, patient is on room air  CV:  rate and rhythm regular. Edema trace in bilateral lower extremities. VASCULAR: warm extremities without open areas.  ABDOMEN: normal bowel sounds, soft, nontender.  M/S:   Gait and station: ambulates independently without the use of an aid, no tenderness or swelling of the joints; able to move all extremities   SKIN:  Inspection and palpation of skin and subcutaneous tissue: skin warm, dry and intact without rashes.   NEURO: no facial asymmetry, no speech deficits and able to follow directions, moves all extremities symmetrically  PSYCH:  insight, judgement, and memory impaired affect and mood normal    Labs:   CBC RESULTS:   Recent Labs   Lab Test 07/21/20 08/23/19  1532   WBC 5.7 5.5   RBC 4.81 4.75   HGB 15.1 15.7   HCT 48.4* 46.6   * 98   MCH 31.4 33.1*   MCHC 31.2* 33.7   RDW 13.5 13.8    162       Last Basic Metabolic Panel:  Recent Labs   Lab Test 07/21/20 01/21/20    142   POTASSIUM 3.6 3.9   CHLORIDE 101 103   RAEANN 9.7 9.5   CO2 30 33*   BUN 16 19   CR 0.84 0.84   GLC 67* 76       Liver Function Studies -   Recent Labs   Lab Test 08/23/19  1527   PROTTOTAL 6.5*   ALBUMIN 3.6   BILITOTAL 0.4   ALKPHOS 69   AST 35   ALT 37       TSH   Date Value Ref Range Status   07/21/2020 1.82 0.30 - 5.00 uIU/mL Final   12/03/2019 1.15 0.30 - 5.00 uIU/mL Final       ASSESSMENT/PLAN:  (F03.90) Dementia without behavioral disturbance, unspecified dementia type (H)  (primary encounter diagnosis)  Comment: Slums 18/30 upon admission to AL and now 5/30 in 9/2020.   Plan: continue with memory care assisted living for assistance with cares, meals and medications. "     (I47.2) Nonsustained ventricular tachycardia (H)  (I42.9) Cardiomyopathy, unspecified type (H)  (I10) Hypertension  Comment: regular rhythm today. HR 76 today, BP controlled under goal of 150/90.  Plan: continue with metoprolol 25 mg daily.   --lisinopril 7.5 mg every evening  --Continue to monitor blood pressure and heart rate, adjust medications as needed.    (E03.9) Hypothyroidism, unspecified type  Comment: last TSH was 1.82  Plan: continue with levothyroxine 62.5 mcg daily  --recheck TSH annually    Electronically signed by:  MARTY Oviedo CNP

## 2020-11-10 NOTE — LETTER
11/10/2020        RE: Frances Alexander  [facility]  [address]  [facility][1] [id number]        [place] [facility]  [place] Medical Record Number:  [id number]  Place of Service where encounter took place:  [facility] ([facility] [[id number]]  Chief Complaint   Patient presents with     RECHECK       HPI:    Frances Alexander  is a 84 year old (1935), who is being seen today for an episodic care visit.  HPI information obtained from: facility chart records, facility staff, patient report, [facility] chart review and Care Everywhere [facility] chart review.     Ms. Alexander was visited today while in the community area. Dementia has advanced so it is difficult to have conversation. She speaks well but it is nonsensical. Denies pain. Staff does not have concerns at this time.     Past Medical and Surgical History reviewed in Epic today.    MEDICATIONS:  Current Outpatient Medications   Medication Sig Dispense Refill     lisinopril (ZESTRIL) 5 MG tablet Take 1 tablet (5 mg) by mouth every evening 30 tablet 98     brimonidine (ALPHAGAN) 0.2 % ophthalmic solution Place 1 drop into both eyes 2 times daily 1 Bottle 3     dorzolamide-timolol PF (COSOPT) 22.3-6.8 MG/ML opthalmic solution 1 drop 2 times daily       latanoprost (XALATAN) 0.005 % ophthalmic solution 1 drop daily       levothyroxine (SYNTHROID/LEVOTHROID) 125 MCG tablet TAKE ONE-HALF TABLET (62.5MCG) BY MOUTH ONCE DAILY 14 tablet 97     lisinopril (PRINIVIL/ZESTRIL) 2.5 MG tablet TAKE 1 TABLET BY MOUTH EVERY EVENING 30 tablet 98     LUBRICATING EYE DROPS 0.4-0.3 % SOLN ophthalmic solution PLACE DROP(S) INTO AFFECTED EYE(S) 2 TO 4 TIMES DAILY AS NEEDED FOR DRYNESS 15 mL 97     metoprolol succinate ER (TOPROL-XL) 25 MG 24 hr tablet Take 1 tablet (25 mg) by mouth daily 16 tablet 11     Nutritional Supplements (NUTRITIONAL SUPPLEMENT PO) Take by mouth daily       vitamin D3 (CHOLECALCIFEROL) 1000 units (25 mcg) tablet  "Take 1 tablet (1,000 Units) by mouth daily 90 tablet 3       REVIEW OF SYSTEMS:  Limited secondary to cognitive impairment but today pt reports no pain    Objective:  /76   Pulse 74   Temp 97.9  F (36.6  C)   Resp 18   Ht 1.575 m (5' 2\")   Wt 45.8 kg (101 lb)   SpO2 96%   BMI 18.47 kg/m    Exam:  GENERAL APPEARANCE:  Alert, in no distress, pleasant, cooperative  RESP: no respiratory distress, Lung sounds clear, patient is on room air  CV:  rate and rhythm regular. Edema trace in bilateral lower extremities. VASCULAR: warm extremities without open areas.  ABDOMEN: normal bowel sounds, soft, nontender.  M/S:   Gait and station: ambulates independently without the use of an aid, no tenderness or swelling of the joints; able to move all extremities   SKIN:  Inspection and palpation of skin and subcutaneous tissue: skin warm, dry and intact without rashes.   NEURO: no facial asymmetry, no speech deficits and able to follow directions, moves all extremities symmetrically  PSYCH:  insight, judgement, and memory impaired affect and mood normal    Labs:   CBC RESULTS:   Recent Labs   Lab Test 07/21/20 08/23/19  1532   WBC 5.7 5.5   RBC 4.81 4.75   HGB 15.1 15.7   HCT 48.4* 46.6   * 98   MCH 31.4 33.1*   MCHC 31.2* 33.7   RDW 13.5 13.8    162       Last Basic Metabolic Panel:  Recent Labs   Lab Test 07/21/20 01/21/20    142   POTASSIUM 3.6 3.9   CHLORIDE 101 103   RAEANN 9.7 9.5   CO2 30 33*   BUN 16 19   CR 0.84 0.84   GLC 67* 76       Liver Function Studies -   Recent Labs   Lab Test 08/23/19  1527   PROTTOTAL 6.5*   ALBUMIN 3.6   BILITOTAL 0.4   ALKPHOS 69   AST 35   ALT 37       TSH   Date Value Ref Range Status   07/21/2020 1.82 0.30 - 5.00 uIU/mL Final   12/03/2019 1.15 0.30 - 5.00 uIU/mL Final       ASSESSMENT/PLAN:  (F03.90) Dementia without behavioral disturbance, unspecified dementia type (H)  (primary encounter diagnosis)  Comment: Slums 18/30 upon admission to AL and now 5/30 in " 9/2020.   Plan: continue with memory care assisted living for assistance with cares, meals and medications.     (I47.2) Nonsustained ventricular tachycardia (H)  (I42.9) Cardiomyopathy, unspecified type (H)  (I10) Hypertension  Comment: regular rhythm today. HR 76 today, BP controlled under goal of 150/90.  Plan: continue with metoprolol 25 mg daily.   --lisinopril 7.5 mg every evening  --Continue to monitor blood pressure and heart rate, adjust medications as needed.    (E03.9) Hypothyroidism, unspecified type  Comment: last TSH was 1.82  Plan: continue with levothyroxine 62.5 mcg daily  --recheck TSH annually    Electronically signed by:  MARTY Oviedo CNP                     Sincerely,        MARTY Oviedo CNP

## 2020-11-11 RX ORDER — LISINOPRIL 5 MG/1
5 TABLET ORAL EVERY EVENING
Qty: 30 TABLET | Refills: 98 | COMMUNITY
Start: 2020-11-11 | End: 2021-01-19

## 2020-12-16 ENCOUNTER — ASSISTED LIVING VISIT (OUTPATIENT)
Dept: GERIATRICS | Facility: CLINIC | Age: 85
End: 2020-12-16
Payer: COMMERCIAL

## 2020-12-16 VITALS — TEMPERATURE: 97.6 F | WEIGHT: 98.2 LBS | OXYGEN SATURATION: 91 % | BODY MASS INDEX: 17.96 KG/M2

## 2020-12-16 DIAGNOSIS — G30.1 LATE ONSET ALZHEIMER'S DISEASE WITHOUT BEHAVIORAL DISTURBANCE (H): ICD-10-CM

## 2020-12-16 DIAGNOSIS — I10 BENIGN ESSENTIAL HYPERTENSION: Primary | ICD-10-CM

## 2020-12-16 DIAGNOSIS — F02.80 LATE ONSET ALZHEIMER'S DISEASE WITHOUT BEHAVIORAL DISTURBANCE (H): ICD-10-CM

## 2020-12-16 DIAGNOSIS — I47.10 SVT (SUPRAVENTRICULAR TACHYCARDIA) (H): ICD-10-CM

## 2020-12-16 DIAGNOSIS — I42.9 CARDIOMYOPATHY, UNSPECIFIED TYPE (H): ICD-10-CM

## 2020-12-16 DIAGNOSIS — E44.0 MODERATE PROTEIN-CALORIE MALNUTRITION (H): ICD-10-CM

## 2020-12-16 DIAGNOSIS — E03.9 HYPOTHYROIDISM, UNSPECIFIED TYPE: ICD-10-CM

## 2020-12-16 NOTE — LETTER
"    12/16/2020        RE: Frances Alexander  [facility]  [address]  [facility] [address]        Frances Alexander is a 85 year old female seen December 16, 2020 at [facility] Care unit where she has resided for one year (admit 10/2019) seen to follow up dementia, HTN and hypothyroidism.   Pt is seen in her apartment up ambulating without device.   Considerable conversation, but most of that is nonsensical.   She has some difficulty hearing and understanding questions.  Says no to pain, dyspnea or any current CV symptoms.   \"This is what I do.\"     By chart review, patient was living independently in [place], but family noticed cognitive decline with weight loss and moved her to [place] in August 2019, to an AL in [place].   She did not do well there, and moved to Memory Care for additional support.   However, has had a couple of elopement attempts since admission and continues to have concerns about staff coming into her apartment for med administration.  She has improved overall in AL, with stable weight.  Pt was seen by Cardiology for palpitations, with Ziopatch revealing >800 short runs of SVT and one 5 beat run of VT.    She was started on metoprolol.  She was also noted to have mild cardiomyopathy of unclear etiology, EF 46%, mild -mod TR and mild pulmonary HTN    Past Medical History:   Diagnosis Date     Cardiomyopathy (H)     mild, EF 46%      Essential hypertension      Hypothyroidism 8/30/2019     Irregular heart beat 8/30/2019     NSVT (nonsustained ventricular tachycardia) (H)      Vitamin D deficiency 8/30/2019   Breast cancer, s/p Right mastectomy    SH:  , lives [place] Memory Care unit.  Moved to [place] from [place]   She has a son Frank, daughter-in-law Lisa and 2 grandchildren that live in [place].   Her daughter Sherrell and a granddaughter live in [place]    She also has a sister Kajal.   Non smoker    ROS:  SLUMS 18/30     Hearing loss  Wt Readings from Last 5 " Encounters:   12/16/20 44.5 kg (98 lb 3.2 oz)   10/24/20 45.8 kg (101 lb)   06/26/20 46.1 kg (101 lb 9.6 oz)   06/26/20 46.1 kg (101 lb 9.6 oz)   05/07/20 45.4 kg (100 lb)      EXAM:  NAD  Temp 97.6  F (36.4  C)   Wt 44.5 kg (98 lb 3.2 oz)   SpO2 91%   BMI 17.96 kg/m     Recheck VS today bp 118/62  HR 70  O2sat 97%   Neck supple without adenopathy  S/p right mastectomy  Lungs with decreased BS, no rales or wheeze  Heart RRR s1s2  Abd soft, NT, no distention, +BS  Ext without edema  Neuro: ambulatory without device, disoriented, +STML, confabulatory  Psych: affect a little anxious    Last Comprehensive Metabolic Panel:  Sodium   Date Value Ref Range Status   07/21/2020 141 136 - 145 mmol/L Final     Potassium   Date Value Ref Range Status   07/21/2020 3.6 3.5 - 5.0 mmol/L Final     Chloride   Date Value Ref Range Status   07/21/2020 101 98 - 107 mmol/L Final     Carbon Dioxide   Date Value Ref Range Status   07/21/2020 30 22 - 31 mmol/L Final     Anion Gap   Date Value Ref Range Status   07/21/2020 10 5 - 18 mmol/L Final     Glucose   Date Value Ref Range Status   07/21/2020 67 (L) 70 - 125 mg/dL Final     Urea Nitrogen   Date Value Ref Range Status   07/21/2020 16 8 - 28 mg/dL Final     Creatinine   Date Value Ref Range Status   07/21/2020 0.84 0.60 - 1.10 mg/dL Final     GFR Estimate   Date Value Ref Range Status   07/21/2020 >60 >60 ml/min/1.73m2 Final     Calcium   Date Value Ref Range Status   07/21/2020 9.7 8.5 - 10.5 mg/dL Final     Lab Results   Component Value Date    WBC 5.7 07/21/2020      HGB 15.1 07/21/2020       07/21/2020       07/21/2020     TSH   Date Value Ref Range Status   07/21/2020 1.82 0.30 - 5.00 uIU/mL Final     9/27/19 Lexiscan     Impression:  1.  Myocardial perfusion imaging using single isotope technique  demonstrated normal tracer uptake at rest and stress without evidence for ischemia or infarction.   2.  Gated images demonstrated mild global hypokinesis.  The  left  ventricular systolic function is mildly reduced ejection fraction of 46%.      IMP/PLAN:   (I10) Benign essential hypertension   Comment: GFR >60     BP Readings from Last 3 Encounters:   10/24/20 134/76   06/26/20 (!) 171/96   05/07/20 124/76      Pulse Readings from Last 4 Encounters:   10/24/20 74   06/26/20 74   05/07/20 88   02/15/20 71      Plan: lisinopril 7.5 mg and metoprolol 25 mg/day      (E44.0) Moderate protein-calorie malnutrition (H)  Comment: weight 96>>104>>98 lbs   Plan: preferences, assist to meals, Magic Cup supplement bid.        (I42.9) Cardiomyopathy, unspecified type (H)  Comment: unclear etiology, does not appear to be symptomatic     Plan: she is on an ACEI and beta blocker.   Monitor for symptoms.        (E03.9) Hypothyroidism, unspecified type  Comment:   TSH   Date Value Ref Range Status   07/21/2020 1.82 0.30 - 5.00 uIU/mL Final      Plan: levothyroxine 62.5 mcg/day.   Follow TSH with goal 3-6       (I47.1) SVT (supraventricular tachycardia) (H)  Comment: >800 runs on Ziopatch reading   Plan: metoprolol ER 25 mg/day       (G30.1,  F02.80) Late onset Alzheimer's disease without behavioral disturbance (H)  Comment: confusion and disorientation, low functional status with preserved language and mobility  Plan: AL Memory Care unit for assist with meds, meals, activity; secure unit for safety     Advanced directive: pt is DNR/DNI per signed POLST Alyssa Sandra MD         Sincerely,        Alyssa Sandra MD

## 2020-12-16 NOTE — PROGRESS NOTES
"Frances Alexander is a 85 year old female seen December 16, 2020 at [facility] Memory Care unit where she has resided for one year (admit 10/2019) seen to follow up dementia, HTN and hypothyroidism.   Pt is seen in her apartment up ambulating without device.   Considerable conversation, but most of that is nonsensical.   She has some difficulty hearing and understanding questions.  Says no to pain, dyspnea or any current CV symptoms.   \"This is what I do.\"     By chart review, patient was living independently in [place], but family noticed cognitive decline with weight loss and moved her to [place] in August 2019, to an AL in [place].   She did not do well there, and moved to Memory Care for additional support.   However, has had a couple of elopement attempts since admission and continues to have concerns about staff coming into her apartment for med administration.  She has improved overall in AL, with stable weight.  Pt was seen by Cardiology for palpitations, with Ziopatch revealing >800 short runs of SVT and one 5 beat run of VT.    She was started on metoprolol.  She was also noted to have mild cardiomyopathy of unclear etiology, EF 46%, mild -mod TR and mild pulmonary HTN    Past Medical History:   Diagnosis Date     Cardiomyopathy (H)     mild, EF 46%      Essential hypertension      Hypothyroidism 8/30/2019     Irregular heart beat 8/30/2019     NSVT (nonsustained ventricular tachycardia) (H)      Vitamin D deficiency 8/30/2019   Breast cancer, s/p Right mastectomy    SH:  , lives [place] Memory Care unit.  Moved to [place] from [place]   She has a son Frank, daughter-in-law Lisa and 2 grandchildren that live in [place].   Her daughter Sherrell and a granddaughter live in [place]    She also has a sister Kajal.   Non smoker    ROS:  SLUMS 18/30     Hearing loss  Wt Readings from Last 5 Encounters:   12/16/20 44.5 kg (98 lb 3.2 oz)   10/24/20 45.8 kg (101 lb)   06/26/20 46.1 kg (101 lb 9.6 " oz)   06/26/20 46.1 kg (101 lb 9.6 oz)   05/07/20 45.4 kg (100 lb)      EXAM:  NAD  Temp 97.6  F (36.4  C)   Wt 44.5 kg (98 lb 3.2 oz)   SpO2 91%   BMI 17.96 kg/m     Recheck VS today bp 118/62  HR 70  O2sat 97%   Neck supple without adenopathy  S/p right mastectomy  Lungs with decreased BS, no rales or wheeze  Heart RRR s1s2  Abd soft, NT, no distention, +BS  Ext without edema  Neuro: ambulatory without device, disoriented, +STML, confabulatory  Psych: affect a little anxious    Last Comprehensive Metabolic Panel:  Sodium   Date Value Ref Range Status   07/21/2020 141 136 - 145 mmol/L Final     Potassium   Date Value Ref Range Status   07/21/2020 3.6 3.5 - 5.0 mmol/L Final     Chloride   Date Value Ref Range Status   07/21/2020 101 98 - 107 mmol/L Final     Carbon Dioxide   Date Value Ref Range Status   07/21/2020 30 22 - 31 mmol/L Final     Anion Gap   Date Value Ref Range Status   07/21/2020 10 5 - 18 mmol/L Final     Glucose   Date Value Ref Range Status   07/21/2020 67 (L) 70 - 125 mg/dL Final     Urea Nitrogen   Date Value Ref Range Status   07/21/2020 16 8 - 28 mg/dL Final     Creatinine   Date Value Ref Range Status   07/21/2020 0.84 0.60 - 1.10 mg/dL Final     GFR Estimate   Date Value Ref Range Status   07/21/2020 >60 >60 ml/min/1.73m2 Final     Calcium   Date Value Ref Range Status   07/21/2020 9.7 8.5 - 10.5 mg/dL Final     Lab Results   Component Value Date    WBC 5.7 07/21/2020      HGB 15.1 07/21/2020       07/21/2020       07/21/2020     TSH   Date Value Ref Range Status   07/21/2020 1.82 0.30 - 5.00 uIU/mL Final     9/27/19 Lexiscan     Impression:  1.  Myocardial perfusion imaging using single isotope technique  demonstrated normal tracer uptake at rest and stress without evidence for ischemia or infarction.   2.  Gated images demonstrated mild global hypokinesis.  The left  ventricular systolic function is mildly reduced ejection fraction of 46%.      IMP/PLAN:   (I10) Benign  essential hypertension   Comment: GFR >60     BP Readings from Last 3 Encounters:   10/24/20 134/76   06/26/20 (!) 171/96   05/07/20 124/76      Pulse Readings from Last 4 Encounters:   10/24/20 74   06/26/20 74   05/07/20 88   02/15/20 71      Plan: lisinopril 7.5 mg and metoprolol 25 mg/day      (E44.0) Moderate protein-calorie malnutrition (H)  Comment: weight 96>>104>>98 lbs   Plan: preferences, assist to meals, Magic Cup supplement bid.        (I42.9) Cardiomyopathy, unspecified type (H)  Comment: unclear etiology, does not appear to be symptomatic     Plan: she is on an ACEI and beta blocker.   Monitor for symptoms.        (E03.9) Hypothyroidism, unspecified type  Comment:   TSH   Date Value Ref Range Status   07/21/2020 1.82 0.30 - 5.00 uIU/mL Final      Plan: levothyroxine 62.5 mcg/day.   Follow TSH with goal 3-6       (I47.1) SVT (supraventricular tachycardia) (H)  Comment: >800 runs on Ziopatch reading   Plan: metoprolol ER 25 mg/day       (G30.1,  F02.80) Late onset Alzheimer's disease without behavioral disturbance (H)  Comment: confusion and disorientation, low functional status with preserved language and mobility  Plan: [place] Memory Care unit for assist with meds, meals, activity; secure unit for safety     Advanced directive: pt is DNR/DNI per signed DELISA Sandra MD

## 2020-12-23 ENCOUNTER — RECORDS - HEALTHEAST (OUTPATIENT)
Dept: LAB | Facility: CLINIC | Age: 85
End: 2020-12-23

## 2020-12-23 LAB
SARS-COV-2 PCR COMMENT: NORMAL
SARS-COV-2 RNA SPEC QL NAA+PROBE: NEGATIVE
SARS-COV-2 VIRUS SPECIMEN SOURCE: NORMAL

## 2021-01-17 DIAGNOSIS — I10 BENIGN ESSENTIAL HYPERTENSION: ICD-10-CM

## 2021-01-19 RX ORDER — LISINOPRIL 2.5 MG/1
TABLET ORAL
Qty: 28 TABLET | Refills: 97 | Status: SHIPPED | OUTPATIENT
Start: 2021-01-19 | End: 2021-11-08

## 2021-01-19 RX ORDER — LISINOPRIL 5 MG/1
TABLET ORAL
Qty: 28 TABLET | Refills: 97 | Status: SHIPPED | OUTPATIENT
Start: 2021-01-19 | End: 2021-11-08

## 2021-02-01 PROBLEM — F03.90 DEMENTIA (H): Status: ACTIVE | Noted: 2019-01-05

## 2021-02-24 ENCOUNTER — RECORDS - HEALTHEAST (OUTPATIENT)
Dept: LAB | Facility: CLINIC | Age: 86
End: 2021-02-24

## 2021-03-09 ENCOUNTER — ASSISTED LIVING VISIT (OUTPATIENT)
Dept: GERIATRICS | Facility: CLINIC | Age: 86
End: 2021-03-09
Payer: COMMERCIAL

## 2021-03-09 VITALS — WEIGHT: 93.2 LBS | HEIGHT: 62 IN | BODY MASS INDEX: 17.15 KG/M2 | OXYGEN SATURATION: 95 % | TEMPERATURE: 99.6 F

## 2021-03-09 DIAGNOSIS — I10 BENIGN ESSENTIAL HYPERTENSION: ICD-10-CM

## 2021-03-09 DIAGNOSIS — I42.9 CARDIOMYOPATHY, UNSPECIFIED TYPE (H): ICD-10-CM

## 2021-03-09 DIAGNOSIS — E03.9 HYPOTHYROIDISM, UNSPECIFIED TYPE: ICD-10-CM

## 2021-03-09 DIAGNOSIS — G30.1 LATE ONSET ALZHEIMER'S DISEASE WITHOUT BEHAVIORAL DISTURBANCE (H): ICD-10-CM

## 2021-03-09 DIAGNOSIS — I47.10 SVT (SUPRAVENTRICULAR TACHYCARDIA) (H): ICD-10-CM

## 2021-03-09 DIAGNOSIS — F02.80 LATE ONSET ALZHEIMER'S DISEASE WITHOUT BEHAVIORAL DISTURBANCE (H): ICD-10-CM

## 2021-03-09 DIAGNOSIS — E44.0 MODERATE PROTEIN-CALORIE MALNUTRITION (H): Primary | ICD-10-CM

## 2021-03-09 ASSESSMENT — MIFFLIN-ST. JEOR: SCORE: 821

## 2021-03-09 NOTE — LETTER
"    3/9/2021        RE: Frances Alexander  [facility]  [address]  [facility] [address]        [facility]  Chief Complaint   Patient presents with     Annual Visit     [place] Medical Record Number:  [id number]  [facility] where [facility] [[id number]]    HPI:    Frances Alexander  is a 85 year old  (1935), who is being seen today for an annual comprehensive visit. HPI information obtained from: facility chart records, facility staff, patient report and Care Everywhere Epic chart review.      Ms. Alexander was visited today while in her room. Upon entering, she was on the couch talking to a cat stuffed animal and a baby, smiling at them both. She denies pain. States that the boy is, \"such a good boy.\" Unable to obtain other hx due to dementia but she is cooperative and cheerful today.     Staff does not have any concerns at this time.     ALLERGIES: No known allergies  PAST MEDICAL HISTORY:  has a past medical history of Abnormal electrocardiogram (08/28/2019), Abnormal weight loss (10/24/2019), Breast cancer (H) (10/27/2019), Cardiomyopathy (H), Chronic systolic heart failure (H) (10/27/2019), Dementia (H) (10/27/2019), Essential hypertension (08/28/2019), Glaucoma (08/28/2019), Hypothyroidism (8/30/2019), Irregular heart beat (8/30/2019), Left upper quadrant abdominal swelling, mass and lump (08/28/2019), Moderate protein-calorie malnutrition (H) (08/28/2019), NSVT (nonsustained ventricular tachycardia) (H) (10/27/2019), Other specified diseases of liver (10/27/2019), and Vitamin D deficiency (8/30/2019).  PAST SURGICAL HISTORY:  has a past surgical history that includes Mastectomy (Right).  IMMUNIZATIONS:  Immunization History   Administered Date(s) Administered     COVID-19,PF,Moderna 01/25/2021, 02/25/2021     Influenza, Quad, High Dose, Pf, 65yr + 09/23/2020     Pneumo Conj 13-V (2010&after) 12/10/2019     TDAP " Vaccine (Adacel) 01/01/2017     Above immunizations pulled from [facility]. [facility] and facility records also reconciled. Outstanding information sent to  to update [facility].  Future immunizations are not needed at this point as all recommended immunizations are up to date.     Current Outpatient Medications   Medication Sig Dispense Refill     brimonidine (ALPHAGAN) 0.2 % ophthalmic solution Place 1 drop into both eyes 2 times daily 1 Bottle 3     dorzolamide-timolol PF (COSOPT) 22.3-6.8 MG/ML opthalmic solution 1 drop 2 times daily       latanoprost (XALATAN) 0.005 % ophthalmic solution Place 1 drop into the right eye At Bedtime        levothyroxine (SYNTHROID/LEVOTHROID) 125 MCG tablet TAKE ONE-HALF TABLET (62.5MCG) BY MOUTH ONCE DAILY 14 tablet 97     LUBRICATING EYE DROPS 0.4-0.3 % SOLN ophthalmic solution PLACE DROP(S) INTO AFFECTED EYE(S) 2 TO 4 TIMES DAILY AS NEEDED FOR DRYNESS 15 mL 97     metoprolol succinate ER (TOPROL-XL) 25 MG 24 hr tablet Take 1 tablet (25 mg) by mouth daily 16 tablet 11     Nutritional Supplements (NUTRITIONAL SUPPLEMENT PO) Take by mouth daily       vitamin D3 (CHOLECALCIFEROL) 1000 units (25 mcg) tablet Take 1 tablet (1,000 Units) by mouth daily 90 tablet 3     lisinopril (ZESTRIL) 2.5 MG tablet TAKE 1 TABLET BY MOUTH EVERY EVENING 28 tablet 97     lisinopril (ZESTRIL) 5 MG tablet TAKE 1 TABLET BY MOUTH EVERY MORNING 28 tablet 97     Case Management:  I have reviewed the Assisted Living care plan, current immunizations and preventive care/cancer screening. .Future cancer screening is not clinically indicated secondary to age/goals of care Patient's desire to return to the community is not assessible due to cognitive impairment. Current Level of Care is appropriate.    Advance Directive Discussion:    I reviewed the current advanced directives as reflected in EPIC, the POLST and the facility chart, and verified the congruency of orders. I contacted the first  "party Frank and left a message regarding the plan of Care.  I did not due to cognitive impairment review the advance directives with the resident.     Team Discussion:  I communicated with the appropriate disciplines involved with the Plan of Care:   Nursing    Information reviewed:  Medications, vital signs, orders, and nursing notes.    ROS:  Unobtainable secondary to cognitive impairment.     Vitals:  Temp 99.6  F (37.6  C)   Ht 1.575 m (5' 2\")   Wt 42.3 kg (93 lb 3.2 oz)   SpO2 95%   BMI 17.05 kg/m   Body mass index is 17.05 kg/m .  Exam:  GENERAL APPEARANCE:  Alert, in no distress, pleasant, cooperative  EYES: no discharge or mattering on lids or lashes noted  ENT:  moist mucous membranes, hearing acuity intact  NECK: supple, symmetrical  RESP: no respiratory distress, Lung sounds clear, patient is on room air  CV:  rate and rhythm regular, no murmur. Edema none in bilateral lower extremities. VASCULAR: warm extremities without open areas.  ABDOMEN: normal bowel sounds, soft, nontender.  M/S:   Gait and station: ambulates independently without the use of an aid, no tenderness or swelling of the joints; able to move all extremities   SKIN:  Inspection and palpation of skin and subcutaneous tissue: skin warm, dry and intact without rashes  NEURO: no facial asymmetry, no speech deficits and able to follow directions, moves all extremities symmetrically  PSYCH:  insight, judgement, and memory impaired affect and mood normal    Lab/Diagnostic data:   CBC RESULTS:   Recent Labs   Lab Test 07/21/20 08/23/19  1532   WBC 5.7 5.5   RBC 4.81 4.75   HGB 15.1 15.7   HCT 48.4* 46.6   * 98   MCH 31.4 33.1*   MCHC 31.2* 33.7   RDW 13.5 13.8    162       Last Basic Metabolic Panel:  Recent Labs   Lab Test 07/21/20 01/21/20    142   POTASSIUM 3.6 3.9   CHLORIDE 101 103   RAEANN 9.7 9.5   CO2 30 33*   BUN 16 19   CR 0.84 0.84   GLC 67* 76       Liver Function Studies -   Recent Labs   Lab Test 08/23/19  1527 "   PROTTOTAL 6.5*   ALBUMIN 3.6   BILITOTAL 0.4   ALKPHOS 69   AST 35   ALT 37       TSH   Date Value Ref Range Status   07/21/2020 1.82 0.30 - 5.00 uIU/mL Final   12/03/2019 1.15 0.30 - 5.00 uIU/mL Final     ASSESSMENT/PLAN  (F03.90) Dementia without behavioral disturbance, unspecified dementia type (H)  (primary encounter diagnosis)  Comment: Slums 18/30 upon admission to AL and now 5/30 in 9/2020. low functional status.   Plan: continue with memory care assisted living for assistance with cares, meals and medications.      (I47.1) SVT (H)  (I42.9) Cardiomyopathy, unspecified type (H)  (I10) Hypertension  Comment: EF 46%, mild -mod TR and mild pulmonary HTN regular rhythm today. HR 88 today. Based on JNC-8 goals,  patients age of 85 year old, no presence of diabetes or CKD, and goals of care goal BP is <150/90 mm Hg. Patient is stable with current plan of care and routine assessment..  Plan: continue with metoprolol 25 mg daily.   --lisinopril 7.5 mg every evening  --Continue to monitor blood pressure and heart rate, adjust medications as needed.    (E44.0) Moderate protein-calorie malnutrition (H)  Comment: weight 96 on admission in 2019 and has been up to 104 and now back in the mid 90s.    Plan: preferences, assist to meals, Magic Cup supplement bid.     (E03.9) Hypothyroidism, unspecified type  Comment: last TSH was 1.82  Plan: continue with levothyroxine 62.5 mcg daily  --recheck TSH on next lab day    Electronically signed by:  MARTY Oviedo CNP             Sincerely,        MARTY Oviedo CNP

## 2021-03-09 NOTE — PROGRESS NOTES
"[facility]  Chief Complaint   Patient presents with     Annual Visit     [place] Medical Record Number:  [id number]  Place [facility] where encounter took place[facility] [[id number]]    HPI:    Frances Alexander  is a 85 year old  (1935), who is being seen today for an annual comprehensive visit. HPI information obtained from: facility chart records, facility staff, patient report and Care Everywhere Epic chart review.      Ms. Alexander was visited today while in her room. Upon entering, she was on the couch talking to a cat stuffed animal and a baby, smiling at them both. She denies pain. States that the boy is, \"such a good boy.\" Unable to obtain other hx due to dementia but she is cooperative and cheerful today.     Staff does not have any concerns at this time.     ALLERGIES: No known allergies  PAST MEDICAL HISTORY:  has a past medical history of Abnormal electrocardiogram (08/28/2019), Abnormal weight loss (10/24/2019), Breast cancer (H) (10/27/2019), Cardiomyopathy (H), Chronic systolic heart failure (H) (10/27/2019), Dementia (H) (10/27/2019), Essential hypertension (08/28/2019), Glaucoma (08/28/2019), Hypothyroidism (8/30/2019), Irregular heart beat (8/30/2019), Left upper quadrant abdominal swelling, mass and lump (08/28/2019), Moderate protein-calorie malnutrition (H) (08/28/2019), NSVT (nonsustained ventricular tachycardia) (H) (10/27/2019), Other specified diseases of liver (10/27/2019), and Vitamin D deficiency (8/30/2019).  PAST SURGICAL HISTORY:  has a past surgical history that includes Mastectomy (Right).  IMMUNIZATIONS:  Immunization History   Administered Date(s) Administered     COVID-19,PF,Moderna 01/25/2021, 02/25/2021     Influenza, Quad, High Dose, Pf, 65yr + 09/23/2020     Pneumo Conj 13-V (2010&after) 12/10/2019     TDAP Vaccine (Adacel) 01/01/2017     Above immunizations pulled from [facility]. [facility]IC and facility records " also reconciled. Outstanding information sent to  to update [facility].  Future immunizations are not needed at this point as all recommended immunizations are up to date.     Current Outpatient Medications   Medication Sig Dispense Refill     brimonidine (ALPHAGAN) 0.2 % ophthalmic solution Place 1 drop into both eyes 2 times daily 1 Bottle 3     dorzolamide-timolol PF (COSOPT) 22.3-6.8 MG/ML opthalmic solution 1 drop 2 times daily       latanoprost (XALATAN) 0.005 % ophthalmic solution Place 1 drop into the right eye At Bedtime        levothyroxine (SYNTHROID/LEVOTHROID) 125 MCG tablet TAKE ONE-HALF TABLET (62.5MCG) BY MOUTH ONCE DAILY 14 tablet 97     LUBRICATING EYE DROPS 0.4-0.3 % SOLN ophthalmic solution PLACE DROP(S) INTO AFFECTED EYE(S) 2 TO 4 TIMES DAILY AS NEEDED FOR DRYNESS 15 mL 97     metoprolol succinate ER (TOPROL-XL) 25 MG 24 hr tablet Take 1 tablet (25 mg) by mouth daily 16 tablet 11     Nutritional Supplements (NUTRITIONAL SUPPLEMENT PO) Take by mouth daily       vitamin D3 (CHOLECALCIFEROL) 1000 units (25 mcg) tablet Take 1 tablet (1,000 Units) by mouth daily 90 tablet 3     lisinopril (ZESTRIL) 2.5 MG tablet TAKE 1 TABLET BY MOUTH EVERY EVENING 28 tablet 97     lisinopril (ZESTRIL) 5 MG tablet TAKE 1 TABLET BY MOUTH EVERY MORNING 28 tablet 97     Case Management:  I have reviewed the Assisted Living care plan, current immunizations and preventive care/cancer screening. .Future cancer screening is not clinically indicated secondary to age/goals of care Patient's desire to return to the community is not assessible due to cognitive impairment. Current Level of Care is appropriate.    Advance Directive Discussion:    I reviewed the current advanced directives as reflected in EPIC, the POLST and the facility chart, and verified the congruency of orders. I contacted the first party Frank and left a message regarding the plan of Care.  I did not due to cognitive impairment review the  "advance directives with the resident.     Team Discussion:  I communicated with the appropriate disciplines involved with the Plan of Care:   Nursing    Information reviewed:  Medications, vital signs, orders, and nursing notes.    ROS:  Unobtainable secondary to cognitive impairment.     Vitals:  Temp 99.6  F (37.6  C)   Ht 1.575 m (5' 2\")   Wt 42.3 kg (93 lb 3.2 oz)   SpO2 95%   BMI 17.05 kg/m   Body mass index is 17.05 kg/m .  Exam:  GENERAL APPEARANCE:  Alert, in no distress, pleasant, cooperative  EYES: no discharge or mattering on lids or lashes noted  ENT:  moist mucous membranes, hearing acuity intact  NECK: supple, symmetrical  RESP: no respiratory distress, Lung sounds clear, patient is on room air  CV:  rate and rhythm regular, no murmur. Edema none in bilateral lower extremities. VASCULAR: warm extremities without open areas.  ABDOMEN: normal bowel sounds, soft, nontender.  M/S:   Gait and station: ambulates independently without the use of an aid, no tenderness or swelling of the joints; able to move all extremities   SKIN:  Inspection and palpation of skin and subcutaneous tissue: skin warm, dry and intact without rashes  NEURO: no facial asymmetry, no speech deficits and able to follow directions, moves all extremities symmetrically  PSYCH:  insight, judgement, and memory impaired affect and mood normal    Lab/Diagnostic data:   CBC RESULTS:   Recent Labs   Lab Test 07/21/20 08/23/19  1532   WBC 5.7 5.5   RBC 4.81 4.75   HGB 15.1 15.7   HCT 48.4* 46.6   * 98   MCH 31.4 33.1*   MCHC 31.2* 33.7   RDW 13.5 13.8    162       Last Basic Metabolic Panel:  Recent Labs   Lab Test 07/21/20 01/21/20    142   POTASSIUM 3.6 3.9   CHLORIDE 101 103   RAEANN 9.7 9.5   CO2 30 33*   BUN 16 19   CR 0.84 0.84   GLC 67* 76       Liver Function Studies -   Recent Labs   Lab Test 08/23/19  1527   PROTTOTAL 6.5*   ALBUMIN 3.6   BILITOTAL 0.4   ALKPHOS 69   AST 35   ALT 37       TSH   Date Value Ref " Range Status   07/21/2020 1.82 0.30 - 5.00 uIU/mL Final   12/03/2019 1.15 0.30 - 5.00 uIU/mL Final     ASSESSMENT/PLAN  (F03.90) Dementia without behavioral disturbance, unspecified dementia type (H)  (primary encounter diagnosis)  Comment: Slums 18/30 upon admission to AL and now 5/30 in 9/2020. low functional status.   Plan: continue with memory care assisted living for assistance with cares, meals and medications.      (I47.1) SVT (H)  (I42.9) Cardiomyopathy, unspecified type (H)  (I10) Hypertension  Comment: EF 46%, mild -mod TR and mild pulmonary HTN regular rhythm today. HR 88 today. Based on JNC-8 goals,  patients age of 85 year old, no presence of diabetes or CKD, and goals of care goal BP is <150/90 mm Hg. Patient is stable with current plan of care and routine assessment..  Plan: continue with metoprolol 25 mg daily.   --lisinopril 7.5 mg every evening  --Continue to monitor blood pressure and heart rate, adjust medications as needed.    (E44.0) Moderate protein-calorie malnutrition (H)  Comment: weight 96 on admission in 2019 and has been up to 104 and now back in the mid 90s.    Plan: preferences, assist to meals, Magic Cup supplement bid.     (E03.9) Hypothyroidism, unspecified type  Comment: last TSH was 1.82  Plan: continue with levothyroxine 62.5 mcg daily  --recheck TSH on next lab day    Electronically signed by:  MARTY Oviedo CNP

## 2021-03-14 ENCOUNTER — RECORDS - HEALTHEAST (OUTPATIENT)
Dept: LAB | Facility: CLINIC | Age: 86
End: 2021-03-14

## 2021-03-16 ENCOUNTER — TRANSFERRED RECORDS (OUTPATIENT)
Dept: HEALTH INFORMATION MANAGEMENT | Facility: CLINIC | Age: 86
End: 2021-03-16

## 2021-03-16 LAB
TSH SERPL DL<=0.005 MIU/L-ACNC: 0.97 UIU/ML (ref 0.3–5)
TSH SERPL-ACNC: 0.97 UIU/ML (ref 0.3–5)

## 2021-03-17 ENCOUNTER — TELEPHONE (OUTPATIENT)
Dept: GERIATRICS | Facility: CLINIC | Age: 86
End: 2021-03-17

## 2021-03-17 NOTE — TELEPHONE ENCOUNTER
Thanks for the update. I won't make any changes today. Can they please fax lab results to [phone]?

## 2021-03-17 NOTE — TELEPHONE ENCOUNTER
"[facility] BRIDGE COMMUNICATION DOCUMENTATION ENCOUNTER    Frances Alexander is a 85 year old  (1935), Nurse messaged [facility] via the \"Bridge\" today to report:     03/17/2021 9:45:50 AM - By: Erika Simpson       S: Lab results 3/16  B:  resident with services  A: TSH 0.97  R: Will update NP    ASSESSMENT/PLAN  Results sent to day call NP    Please respond in this encounter  Electronically signed by:   Linda Mondragon RN  "

## 2021-03-29 ENCOUNTER — ASSISTED LIVING VISIT (OUTPATIENT)
Dept: GERIATRICS | Facility: CLINIC | Age: 86
End: 2021-03-29
Payer: COMMERCIAL

## 2021-03-29 VITALS
OXYGEN SATURATION: 97 % | WEIGHT: 90 LBS | HEIGHT: 62 IN | HEART RATE: 99 BPM | BODY MASS INDEX: 16.56 KG/M2 | TEMPERATURE: 98.5 F | DIASTOLIC BLOOD PRESSURE: 91 MMHG | SYSTOLIC BLOOD PRESSURE: 123 MMHG | RESPIRATION RATE: 18 BRPM

## 2021-03-29 DIAGNOSIS — G30.1 LATE ONSET ALZHEIMER'S DISEASE WITHOUT BEHAVIORAL DISTURBANCE (H): Primary | ICD-10-CM

## 2021-03-29 DIAGNOSIS — I47.10 SVT (SUPRAVENTRICULAR TACHYCARDIA) (H): ICD-10-CM

## 2021-03-29 DIAGNOSIS — I42.9 CARDIOMYOPATHY, UNSPECIFIED TYPE (H): ICD-10-CM

## 2021-03-29 DIAGNOSIS — F22 PARANOIA (H): ICD-10-CM

## 2021-03-29 DIAGNOSIS — I10 BENIGN ESSENTIAL HYPERTENSION: ICD-10-CM

## 2021-03-29 DIAGNOSIS — E03.9 HYPOTHYROIDISM, UNSPECIFIED TYPE: ICD-10-CM

## 2021-03-29 DIAGNOSIS — E44.0 MODERATE PROTEIN-CALORIE MALNUTRITION (H): ICD-10-CM

## 2021-03-29 DIAGNOSIS — F02.80 LATE ONSET ALZHEIMER'S DISEASE WITHOUT BEHAVIORAL DISTURBANCE (H): Primary | ICD-10-CM

## 2021-03-29 PROCEDURE — 99358 PROLONG SERVICE W/O CONTACT: CPT | Performed by: NURSE PRACTITIONER

## 2021-03-29 ASSESSMENT — MIFFLIN-ST. JEOR: SCORE: 806.49

## 2021-03-29 NOTE — PROGRESS NOTES
"[facility]  Chief Complaint   Patient presents with     JAZ     [facility] Medical Record Number:  [id number]  Place of Service where encounter took place:  [facility] ([facility]) [[id number]]    HPI:    Frances Alexander  is a 85 year old  (1935), who is being seen today for an annual comprehensive visit. HPI information obtained from: facility chart records, facility staff, patient report and Care Everywhere Epic chart review.      Ms. Alexander was visited today while in her room. She was in the bedroom of her apartment, sitting on an ottoman looking out the window, talking and smiling with the toy baby in her lap. She does not engage with conversation but continues to talk to the baby then shows me a stuffed cat that she has on her bed. Unable to obtain other hx due to dementia.     Spoke with care attendant who reports that Frances has been frequently declining her medications for the past 2-3 weeks and has been spending more time in her room.     Message on the bridge on 3/31/21:  \"Resident in bed with baby doll and stuffed rabbit at noon, did not eat breakfast. Assist of one with dressing and grooming. After several attempts, resident took pm medications crushed with chunky ice cream and a few bites of pasta. Resident was tearful reminiscing a mother, and son. She was holding and repeating to baby doll \"I love her so much\". Nursing asked if we could watch her baby. Baby doll was removed from room as was distracting her from meal and was noticeably upsetting to her. Resident needs more assistance with dressing and grooming. /95, P 81, O2 96%, T 97.5 F. Asked resident to go for a walk, still tearful but agreed, resident was able to be weighed, 91.0 lb. Resident sat at table by self to finish her lunch, did not want to be around others due to her crying.\"    Son is concerned that she may be hallucinating and she was doing this.     Current Outpatient " "Medications   Medication Sig Dispense Refill     risperiDONE (RISPERDAL) 0.25 MG tablet Take 1 tablet (0.25 mg) by mouth daily And daily PRN       brimonidine (ALPHAGAN) 0.2 % ophthalmic solution Place 1 drop into both eyes 2 times daily 1 Bottle 3     dorzolamide-timolol PF (COSOPT) 22.3-6.8 MG/ML opthalmic solution 1 drop 2 times daily       latanoprost (XALATAN) 0.005 % ophthalmic solution Place 1 drop into the right eye At Bedtime        levothyroxine (SYNTHROID/LEVOTHROID) 125 MCG tablet TAKE ONE-HALF TABLET (62.5MCG) BY MOUTH ONCE DAILY 14 tablet 97     lisinopril (ZESTRIL) 2.5 MG tablet TAKE 1 TABLET BY MOUTH EVERY EVENING 28 tablet 97     lisinopril (ZESTRIL) 5 MG tablet TAKE 1 TABLET BY MOUTH EVERY MORNING 28 tablet 97     LUBRICATING EYE DROPS 0.4-0.3 % SOLN ophthalmic solution PLACE DROP(S) INTO AFFECTED EYE(S) 2 TO 4 TIMES DAILY AS NEEDED FOR DRYNESS 15 mL 97     metoprolol succinate ER (TOPROL-XL) 25 MG 24 hr tablet Take 1 tablet (25 mg) by mouth daily 16 tablet 11     Nutritional Supplements (NUTRITIONAL SUPPLEMENT PO) Take by mouth daily       vitamin D3 (CHOLECALCIFEROL) 1000 units (25 mcg) tablet Take 1 tablet (1,000 Units) by mouth daily 90 tablet 3     ROS:  Unobtainable secondary to cognitive impairment.     Vitals:  BP (!) 123/91   Pulse 99   Temp 98.5  F (36.9  C)   Resp 18   Ht 1.575 m (5' 2\")   Wt 40.8 kg (90 lb)   SpO2 97%   BMI 16.46 kg/m   Body mass index is 16.46 kg/m .  Exam:  GENERAL APPEARANCE:  Alert, in no distress, pleasant, cooperative  EYES: no discharge or mattering on lids or lashes noted  ENT:  moist mucous membranes, hearing acuity intact  NECK: supple, symmetrical  RESP: no respiratory distress, Lung sounds clear, patient is on room air  CV:  rate and rhythm regular, no murmur. Edema 2+ in bilateral lower extremities. VASCULAR: warm extremities without open areas.  ABDOMEN: normal bowel sounds, soft, nontender.  M/S:   Gait and station: ambulates independently without " the use of an aid, no tenderness or swelling of the joints; able to move all extremities   SKIN:  Inspection and palpation of skin and subcutaneous tissue: skin warm, dry and intact without rashes  NEURO: no facial asymmetry, no speech deficits and able to follow directions, moves all extremities symmetrically  PSYCH:  insight, judgement, and memory impaired affect and mood normal    Lab/Diagnostic data:   CBC RESULTS:   Recent Labs   Lab Test 07/21/20 08/23/19  1532   WBC 5.7 5.5   RBC 4.81 4.75   HGB 15.1 15.7   HCT 48.4* 46.6   * 98   MCH 31.4 33.1*   MCHC 31.2* 33.7   RDW 13.5 13.8    162       Last Basic Metabolic Panel:  Recent Labs   Lab Test 07/21/20 01/21/20    142   POTASSIUM 3.6 3.9   CHLORIDE 101 103   RAEANN 9.7 9.5   CO2 30 33*   BUN 16 19   CR 0.84 0.84   GLC 67* 76       Liver Function Studies -   Recent Labs   Lab Test 08/23/19  1527   PROTTOTAL 6.5*   ALBUMIN 3.6   BILITOTAL 0.4   ALKPHOS 69   AST 35   ALT 37       TSH   Date Value Ref Range Status   03/16/2021 0.97 0.30 - 5.00 uIU/mL Final   07/21/2020 1.82 0.30 - 5.00 uIU/mL Final     ASSESSMENT/PLAN  (F03.90) Dementia without behavioral disturbance, unspecified dementia type (H)  (primary encounter diagnosis)  Comment: Slums 18/30 upon admission to AL and now 5/30 in 9/2020. Appears that dementia has been progressing now requiring more care, and paranoia surrounding medication administration so will start risperidone. Spoke with son regarding hospice care as dementia is progressing, will also get labs on next lab day to ensure there is nothing off in bloodwork that could be causing some of the change.    Plan:   --start risperidone 0.25 mg ODT daily and daily PRN  --continue with memory care assisted living for assistance with cares, meals and medications.      (I47.1) SVT (H)  (I42.9) Cardiomyopathy, unspecified type (H)  (I10) Hypertension  Comment: EF 46%, mild -mod TR and mild pulmonary HTN regular rhythm today. HR 96 today  which is due to not taking medications regularly. Now has increasing lower extremity edema which may be due to higher heart rates from declining medicaitons.   Plan: continue with metoprolol 25 mg daily.   --lisinopril 7.5 mg every evening  --Continue to monitor blood pressure and heart rate, adjust medications as needed.  --[facility] RN to evaluate    (E44.0) Moderate protein-calorie malnutrition (H)  Comment: weight 96 on admission in 2019 and has been up to 104 and now down to 91 lbs  Plan: preferences, assist to meals, Magic Cup supplement bid.     (E03.9) Hypothyroidism, unspecified type  Comment: last TSH was 0.97 so levothyroxine dose was decreased last week.   Plan: continue with levothyroxine 50 mcg daily  --recheck TSH in 8 weeks    Electronically signed by:  MARTY Oviedo CNP     [facility]  NON-FACE TO FACE PROLONGED SERVICE    Frances Alexander 1935    Date of Related Face to Face Service: 3/29/21    INTENT OF SERVICE  This is an extended evaluation of patient's specific problem.  The service relates to a recent or future E/M visit.  Documentation supports medical necessity, relates to ongoing patient management and documents start times and stop times.    Regarding the following diagnoses:     Late onset Alzheimer's disease without behavioral disturbance (H)  Paranoia (H)  SVT (supraventricular tachycardia) (H)  Cardiomyopathy, unspecified type (H)  Benign essential hypertension  Moderate protein-calorie malnutrition (H)  Hypothyroidism, unspecified type,   * I discussed with Frank who is the son of the patient.  (Start time 2:12  Stop time 2:43) on 4/1/21    Called Frank today to discuss the message from nursing as noted above as well as the notable decline that Frances has had over the past few weeks. He is aware that Frances has been declining to take medications intermittently although she did take a pill when he was there this week to take her to the dentist appointment. Frank feels  that she may be having delusions or hallucinations. Frank agrees to starting an antipsychotic if needed to help with the distressing periods.  He did not see her for several weeks up until this week and has noted a decline. Staff reported that Frances has not been eating well and current weight is down to 91 lbs. I reviewed weights with Frank and it was noted that she is down 5 lbs from admission in 2019 (was up to 103 lbs fall 2019, since then she has lost 12 lbs). We discussed the step down process that can happen with dementia and that is likely the cause of her most recent decline. She has not had bloodwork since summer 2020 so we discussed getting bloodwork to ensure there is nothing reversible that may be causing the decline . Labs will be ordered for next lab day at the assisted living. We dicussed goals of care in which Frances has told him in the past that she doesn't want further care if she was ever to the point of not being able to recognize people which is where her current function is at. I explained the hospice program and qualifications needed to enroll in the program. He would like to connect with his sister (lives in [place]) and other family members. In the interim, home care RN has been ordered to help with the lower extremity edema management.     Spoke with nursing (5 minutes) who reports that Frances thinks that the medications are bad for her and that they will harm her therefore she has been refusing. She seems to be fearful surrounding the med administration. She has been very tearful for the past three days, now remembering her mother or being a mother. She had the toy baby which brought her dank which is now causing her to cry so staff has removed that from the room. Stuffed rabbit that gives her the same feelings.     ASSESSMENT/PLAN  As noted above.     TIME  Total time spent with Non-Face to Face prolonged service 36 minutes.     Electronically signed by:  MARTY Oviedo  CNP

## 2021-03-29 NOTE — LETTER
"    3/29/2021        RE: Frances Alexander  [facility]  [address]  [facility] [address]        [facility]  Chief Complaint   Patient presents with     JAZ     [facility] Medical Record Number:  [id number]  Place of Service where encounter took place:  [facility] ([facility]) [[id number]]    HPI:    Frances Alexander  is a 85 year old  (1935), who is being seen today for an annual comprehensive visit. HPI information obtained from: facility chart records, facility staff, patient report and Care Everywhere [facility] chart review.      Ms. Alexander was visited today while in her room. She was in the bedroom of her apartment, sitting on an ottoman looking out the window, talking and smiling with the toy baby in her lap. She does not engage with conversation but continues to talk to the baby then shows me a stuffed cat that she has on her bed. Unable to obtain other hx due to dementia.     Spoke with care attendant who reports that Frances has been frequently declining her medications for the past 2-3 weeks and has been spending more time in her room.     Message on the bridge on 3/31/21:  \"Resident in bed with baby doll and stuffed rabbit at noon, did not eat breakfast. Assist of one with dressing and grooming. After several attempts, resident took pm medications crushed with chunky ice cream and a few bites of pasta. Resident was tearful reminiscing a mother, and son. She was holding and repeating to baby doll \"I love her so much\". Nursing asked if we could watch her baby. Baby doll was removed from room as was distracting her from meal and was noticeably upsetting to her. Resident needs more assistance with dressing and grooming. /95, P 81, O2 96%, T 97.5 F. Asked resident to go for a walk, still tearful but agreed, resident was able to be weighed, 91.0 lb. Resident sat at table by self to finish her lunch, did not want to be around others due to her " "crying.\"    Son is concerned that she may be hallucinating and she was doing this.     Current Outpatient Medications   Medication Sig Dispense Refill     risperiDONE (RISPERDAL) 0.25 MG tablet Take 1 tablet (0.25 mg) by mouth daily And daily PRN       brimonidine (ALPHAGAN) 0.2 % ophthalmic solution Place 1 drop into both eyes 2 times daily 1 Bottle 3     dorzolamide-timolol PF (COSOPT) 22.3-6.8 MG/ML opthalmic solution 1 drop 2 times daily       latanoprost (XALATAN) 0.005 % ophthalmic solution Place 1 drop into the right eye At Bedtime        levothyroxine (SYNTHROID/LEVOTHROID) 125 MCG tablet TAKE ONE-HALF TABLET (62.5MCG) BY MOUTH ONCE DAILY 14 tablet 97     lisinopril (ZESTRIL) 2.5 MG tablet TAKE 1 TABLET BY MOUTH EVERY EVENING 28 tablet 97     lisinopril (ZESTRIL) 5 MG tablet TAKE 1 TABLET BY MOUTH EVERY MORNING 28 tablet 97     LUBRICATING EYE DROPS 0.4-0.3 % SOLN ophthalmic solution PLACE DROP(S) INTO AFFECTED EYE(S) 2 TO 4 TIMES DAILY AS NEEDED FOR DRYNESS 15 mL 97     metoprolol succinate ER (TOPROL-XL) 25 MG 24 hr tablet Take 1 tablet (25 mg) by mouth daily 16 tablet 11     Nutritional Supplements (NUTRITIONAL SUPPLEMENT PO) Take by mouth daily       vitamin D3 (CHOLECALCIFEROL) 1000 units (25 mcg) tablet Take 1 tablet (1,000 Units) by mouth daily 90 tablet 3     ROS:  Unobtainable secondary to cognitive impairment.     Vitals:  BP (!) 123/91   Pulse 99   Temp 98.5  F (36.9  C)   Resp 18   Ht 1.575 m (5' 2\")   Wt 40.8 kg (90 lb)   SpO2 97%   BMI 16.46 kg/m   Body mass index is 16.46 kg/m .  Exam:  GENERAL APPEARANCE:  Alert, in no distress, pleasant, cooperative  EYES: no discharge or mattering on lids or lashes noted  ENT:  moist mucous membranes, hearing acuity intact  NECK: supple, symmetrical  RESP: no respiratory distress, Lung sounds clear, patient is on room air  CV:  rate and rhythm regular, no murmur. Edema 2+ in bilateral lower extremities. VASCULAR: warm extremities without open " areas.  ABDOMEN: normal bowel sounds, soft, nontender.  M/S:   Gait and station: ambulates independently without the use of an aid, no tenderness or swelling of the joints; able to move all extremities   SKIN:  Inspection and palpation of skin and subcutaneous tissue: skin warm, dry and intact without rashes  NEURO: no facial asymmetry, no speech deficits and able to follow directions, moves all extremities symmetrically  PSYCH:  insight, judgement, and memory impaired affect and mood normal    Lab/Diagnostic data:   CBC RESULTS:   Recent Labs   Lab Test 07/21/20 08/23/19  1532   WBC 5.7 5.5   RBC 4.81 4.75   HGB 15.1 15.7   HCT 48.4* 46.6   * 98   MCH 31.4 33.1*   MCHC 31.2* 33.7   RDW 13.5 13.8    162       Last Basic Metabolic Panel:  Recent Labs   Lab Test 07/21/20 01/21/20    142   POTASSIUM 3.6 3.9   CHLORIDE 101 103   RAEANN 9.7 9.5   CO2 30 33*   BUN 16 19   CR 0.84 0.84   GLC 67* 76       Liver Function Studies -   Recent Labs   Lab Test 08/23/19  1527   PROTTOTAL 6.5*   ALBUMIN 3.6   BILITOTAL 0.4   ALKPHOS 69   AST 35   ALT 37       TSH   Date Value Ref Range Status   03/16/2021 0.97 0.30 - 5.00 uIU/mL Final   07/21/2020 1.82 0.30 - 5.00 uIU/mL Final     ASSESSMENT/PLAN  (F03.90) Dementia without behavioral disturbance, unspecified dementia type (H)  (primary encounter diagnosis)  Comment: Slums 18/30 upon admission to AL and now 5/30 in 9/2020. Appears that dementia has been progressing now requiring more care, and paranoia surrounding medication administration so will start risperidone. Spoke with son regarding hospice care as dementia is progressing, will also get labs on next lab day to ensure there is nothing off in bloodwork that could be causing some of the change.    Plan:   --start risperidone 0.25 mg ODT daily and daily PRN  --continue with memory care assisted living for assistance with cares, meals and medications.      (I47.1) SVT (H)  (I42.9) Cardiomyopathy, unspecified type  (H)  (I10) Hypertension  Comment: EF 46%, mild -mod TR and mild pulmonary HTN regular rhythm today. HR 96 today which is due to not taking medications regularly. Now has increasing lower extremity edema which may be due to higher heart rates from declining medicaitons.   Plan: continue with metoprolol 25 mg daily.   --lisinopril 7.5 mg every evening  --Continue to monitor blood pressure and heart rate, adjust medications as needed.  --[facility] RN to evaluate    (E44.0) Moderate protein-calorie malnutrition (H)  Comment: weight 96 on admission in 2019 and has been up to 104 and now down to 91 lbs  Plan: preferences, assist to meals, Magic Cup supplement bid.     (E03.9) Hypothyroidism, unspecified type  Comment: last TSH was 0.97 so levothyroxine dose was decreased last week.   Plan: continue with levothyroxine 50 mcg daily  --recheck TSH in 8 weeks    Electronically signed by:  MARTY Oviedo CNP     [facility]  NON-FACE TO FACE PROLONGED SERVICE    Frances Sapplop 1935    Date of Related Face to Face Service: 3/29/21    INTENT OF SERVICE  This is an extended evaluation of patient's specific problem.  The service relates to a recent or future E/M visit.  Documentation supports medical necessity, relates to ongoing patient management and documents start times and stop times.    Regarding the following diagnoses:     Late onset Alzheimer's disease without behavioral disturbance (H)  Paranoia (H)  SVT (supraventricular tachycardia) (H)  Cardiomyopathy, unspecified type (H)  Benign essential hypertension  Moderate protein-calorie malnutrition (H)  Hypothyroidism, unspecified type,   * I discussed with Frank who is the son of the patient.  (Start time 2:12  Stop time 2:43) on 4/1/21    Called Frank today to discuss the message from nursing as noted above as well as the notable decline that Frances has had over the past few weeks. He is aware that Frances has been declining to take medications  intermittently although she did take a pill when he was there this week to take her to the dentist appointment. Frank feels that she may be having delusions or hallucinations. Frank agrees to starting an antipsychotic if needed to help with the distressing periods.  He did not see her for several weeks up until this week and has noted a decline. Staff reported that Frances has not been eating well and current weight is down to 91 lbs. I reviewed weights with Frank and it was noted that she is down 5 lbs from admission in 2019 (was up to 103 lbs fall 2019, since then she has lost 12 lbs). We discussed the step down process that can happen with dementia and that is likely the cause of her most recent decline. She has not had bloodwork since summer 2020 so we discussed getting bloodwork to ensure there is nothing reversible that may be causing the decline . Labs will be ordered for next lab day at the assisted living. We dicussed goals of care in which Frances has told him in the past that she doesn't want further care if she was ever to the point of not being able to recognize people which is where her current function is at. I explained the hospice program and qualifications needed to enroll in the program. He would like to connect with his sister (lives in [place]) and other family members. In the interim, home care RN has been ordered to help with the lower extremity edema management.     Spoke with nursing (5 minutes) who reports that Frances thinks that the medications are bad for her and that they will harm her therefore she has been refusing. She seems to be fearful surrounding the med administration. She has been very tearful for the past three days, now remembering her mother or being a mother. She had the toy baby which brought her dank which is now causing her to cry so staff has removed that from the room. Stuffed rabbit that gives her the same feelings.     ASSESSMENT/PLAN  As noted above.     TIME  Total time  spent with Non-Face to Face prolonged service 36 minutes.     Electronically signed by:  MARTY Oviedo CNP                          Sincerely,        MARTY Oviedo CNP

## 2021-03-31 ENCOUNTER — TELEPHONE (OUTPATIENT)
Dept: GERIATRICS | Facility: CLINIC | Age: 86
End: 2021-03-31

## 2021-03-31 NOTE — TELEPHONE ENCOUNTER
[facility] now requests orders and shares plan of care/discharge summaries for some patients through [facility].  Please REPLY TO THIS MESSAGE OR ROUTE BACK TO THE AUTHOR in order to give authorization for orders when needed.  This is considered a verbal order, you will still receive a faxed copy of orders for signature.  Thank you for your assistance in improving collaboration for our patients.     ORDER  Skilled nursing home care admission visit for bilateral lower extremity edema to start on or before 4/3/2021     MD SUMMARY/PLAN OF CARE  Dear Bing Jarvis CNP  [facility] process is that all ordered disciplines will be involved in the development of the plan of care.  The following disciplines were unable to see Frances Alexander MRN: [id number] within the 5 day evaluation window.  There will be a delay in the evaluation visit by Skilled nursing. Previous referral orders end on 3/29/2021. Requesting to have orders start on 4/3 due to staffing constraints at this time (if it is possible to see the pt before 4/3 we will staff) but at this time we do not have any availability before 4/3.  We will notify you when the evaluation is completed.  The patient has been notified.       Sincerely [facility]  Regla Morgan, RN  [phone]

## 2021-04-02 RX ORDER — RISPERIDONE 0.25 MG/1
0.25 TABLET ORAL DAILY
COMMUNITY
Start: 2021-04-02 | End: 2021-08-16

## 2021-04-05 ENCOUNTER — RECORDS - HEALTHEAST (OUTPATIENT)
Dept: LAB | Facility: CLINIC | Age: 86
End: 2021-04-05

## 2021-04-06 ENCOUNTER — TRANSFERRED RECORDS (OUTPATIENT)
Dept: HEALTH INFORMATION MANAGEMENT | Facility: CLINIC | Age: 86
End: 2021-04-06

## 2021-04-06 LAB
ALBUMIN SERPL-MCNC: 3.5 G/DL (ref 3.5–5)
ALBUMIN SERPL-MCNC: 3.5 G/DL (ref 3.5–5)
ALP SERPL-CCNC: 69 U/L (ref 45–120)
ALP SERPL-CCNC: 69 U/L (ref 45–120)
ALT SERPL W P-5'-P-CCNC: 24 U/L (ref 0–45)
ALT SERPL-CCNC: 24 U/L (ref 0–45)
ANION GAP SERPL CALCULATED.3IONS-SCNC: 11 MMOL/L (ref 5–18)
ANION GAP SERPL CALCULATED.3IONS-SCNC: 11 MMOL/L (ref 5–18)
AST SERPL W P-5'-P-CCNC: 28 U/L (ref 0–40)
AST SERPL-CCNC: 28 U/L (ref 0–40)
BILIRUB SERPL-MCNC: 0.7 MG/DL (ref 0–1)
BILIRUB SERPL-MCNC: 0.7 MG/DL (ref 0–1)
BUN SERPL-MCNC: 14 MG/DL (ref 8–28)
BUN SERPL-MCNC: 14 MG/DL (ref 8–28)
CALCIUM SERPL-MCNC: 9.2 MG/DL (ref 8.5–10.5)
CALCIUM SERPL-MCNC: 9.2 MG/DL (ref 8.5–10.5)
CHLORIDE BLD-SCNC: 101 MMOL/L (ref 98–107)
CHLORIDE SERPLBLD-SCNC: 101 MMOL/L (ref 98–107)
CO2 SERPL-SCNC: 31 MMOL/L (ref 22–31)
CO2 SERPL-SCNC: 31 MMOL/L (ref 22–31)
CREAT SERPL-MCNC: 0.91 MG/DL (ref 0.6–1.1)
CREAT SERPL-MCNC: 0.91 MG/DL (ref 0.6–1.1)
ERYTHROCYTE [DISTWIDTH] IN BLOOD BY AUTOMATED COUNT: 14.1 % (ref 11–14.5)
ERYTHROCYTE [DISTWIDTH] IN BLOOD BY AUTOMATED COUNT: 14.1 % (ref 11–14.5)
GFR SERPL CREATININE-BSD FRML MDRD: 59 ML/MIN/1.73M2
GFR SERPL CREATININE-BSD FRML MDRD: 59 ML/MIN/1.73M2
GLUCOSE BLD-MCNC: 114 MG/DL (ref 70–125)
GLUCOSE SERPL-MCNC: 114 MG/DL (ref 70–125)
HCT VFR BLD AUTO: 45.4 % (ref 35–47)
HCT VFR BLD AUTO: 45.4 % (ref 35–47)
HEMOGLOBIN: 14.2 G/DL (ref 12–16)
HGB BLD-MCNC: 14.2 G/DL (ref 12–16)
MCH RBC QN AUTO: 31.8 PG (ref 27–34)
MCH RBC QN AUTO: 31.8 PG (ref 27–34)
MCHC RBC AUTO-ENTMCNC: 31.3 G/DL (ref 32–36)
MCHC RBC AUTO-ENTMCNC: 31.3 G/DL (ref 32–36)
MCV RBC AUTO: 102 FL (ref 80–100)
MCV RBC AUTO: 102 FL (ref 80–100)
PLATELET # BLD AUTO: 223 THOU/UL (ref 140–440)
PLATELET # BLD AUTO: 223 THOU/UL (ref 140–440)
PMV BLD AUTO: 10.8 FL (ref 8.5–12.5)
POTASSIUM BLD-SCNC: 3.8 MMOL/L (ref 3.5–5)
POTASSIUM SERPL-SCNC: 3.8 MMOL/L (ref 3.5–5)
PROT SERPL-MCNC: 6.3 G/DL (ref 6–8)
PROT SERPL-MCNC: 6.3 G/DL (ref 6–8)
RBC # BLD AUTO: 4.46 MILL/UL (ref 3.8–5.4)
RBC # BLD AUTO: 4.46 MILL/UL (ref 3.8–5.4)
SODIUM SERPL-SCNC: 143 MMOL/L (ref 136–145)
SODIUM SERPL-SCNC: 143 MMOL/L (ref 136–145)
VIT B12 SERPL-MCNC: 493 PG/ML (ref 213–816)
WBC # BLD AUTO: 5.8 THOU/UL (ref 4–11)
WBC: 5.8 THOU/UL (ref 4–11)

## 2021-04-09 ENCOUNTER — RECORDS - HEALTHEAST (OUTPATIENT)
Dept: LAB | Facility: CLINIC | Age: 86
End: 2021-04-09

## 2021-04-13 ENCOUNTER — ASSISTED LIVING VISIT (OUTPATIENT)
Dept: GERIATRICS | Facility: CLINIC | Age: 86
End: 2021-04-13
Payer: COMMERCIAL

## 2021-04-13 VITALS — BODY MASS INDEX: 16.75 KG/M2 | OXYGEN SATURATION: 97 % | WEIGHT: 91 LBS | HEIGHT: 62 IN | TEMPERATURE: 97.1 F

## 2021-04-13 DIAGNOSIS — E03.9 HYPOTHYROIDISM, UNSPECIFIED TYPE: ICD-10-CM

## 2021-04-13 DIAGNOSIS — E44.0 MODERATE PROTEIN-CALORIE MALNUTRITION (H): ICD-10-CM

## 2021-04-13 DIAGNOSIS — I10 BENIGN ESSENTIAL HYPERTENSION: ICD-10-CM

## 2021-04-13 DIAGNOSIS — F02.80 LATE ONSET ALZHEIMER'S DISEASE WITHOUT BEHAVIORAL DISTURBANCE (H): Primary | ICD-10-CM

## 2021-04-13 DIAGNOSIS — I47.10 SVT (SUPRAVENTRICULAR TACHYCARDIA) (H): ICD-10-CM

## 2021-04-13 DIAGNOSIS — F22 PARANOIA (H): ICD-10-CM

## 2021-04-13 DIAGNOSIS — G30.1 LATE ONSET ALZHEIMER'S DISEASE WITHOUT BEHAVIORAL DISTURBANCE (H): Primary | ICD-10-CM

## 2021-04-13 ASSESSMENT — MIFFLIN-ST. JEOR: SCORE: 811.02

## 2021-04-13 NOTE — LETTER
"    4/13/2021        RE: Frances Alexander  [facility] Of Frank Alexander  [address]  [facility] [address]        [place] [facility]  [place] Medical Record Number:  [id number]  Place of Service where encounter took place[facility] [[id number]]  Chief Complaint   Patient presents with     RECHECK       HPI:    Frances Alexander  is a 85 year old (1935), who is being seen today for an episodic care visit.  HPI information obtained from: facility chart records, facility staff, patient report and [facility] chart review.    Ms Alexander was visited today while in her room. She was just using the restroom, her \"baby\" was sitting on the chair, tucked in with blanket. Frances smiled at the baby. She was unable to follow conversation but did say \"I got the tierra\" as she noted her clothes were the same color as the clothes I was wearing.     Staff reports that she continues to decline medications due to paranoia. Risperidone ordered on 3/31 but not received from pharmacy so staff will look into that. Appetite is still marginal. Had an incontinence episode on 4/5.     Spoke with son, Frank for 23 minutes. We discussed that the blood work completed did not have any abnormalities that would have caused Frances to have a change in cognition. She does have some urinary incontinence which Frank believes is new so we discussed if this is new and has been consistent, a urine sample should be obtained to rule out an infection, he believes this was a one time episode. We again discussed hospice. He was able to connect with his sister whom lives in [place]. They discussed it and would like to get more information so a referral will be made to the hospice team so a virtual visit can be scheduled (a time for [place] and [place]).      Past Medical and Surgical History reviewed in Epic today.    MEDICATIONS:    Current Outpatient Medications   Medication Sig Dispense Refill     brimonidine " "(ALPHAGAN) 0.2 % ophthalmic solution Place 1 drop into both eyes 2 times daily 1 Bottle 3     dorzolamide-timolol PF (COSOPT) 22.3-6.8 MG/ML opthalmic solution 1 drop 2 times daily       latanoprost (XALATAN) 0.005 % ophthalmic solution Place 1 drop into the right eye At Bedtime        levothyroxine (SYNTHROID/LEVOTHROID) 125 MCG tablet TAKE ONE-HALF TABLET (62.5MCG) BY MOUTH ONCE DAILY 14 tablet 97     lisinopril (ZESTRIL) 2.5 MG tablet TAKE 1 TABLET BY MOUTH EVERY EVENING 28 tablet 97     lisinopril (ZESTRIL) 5 MG tablet TAKE 1 TABLET BY MOUTH EVERY MORNING 28 tablet 97     LUBRICATING EYE DROPS 0.4-0.3 % SOLN ophthalmic solution PLACE DROP(S) INTO AFFECTED EYE(S) 2 TO 4 TIMES DAILY AS NEEDED FOR DRYNESS 15 mL 97     metoprolol succinate ER (TOPROL-XL) 25 MG 24 hr tablet Take 1 tablet (25 mg) by mouth daily 16 tablet 11     Nutritional Supplements (NUTRITIONAL SUPPLEMENT PO) Take by mouth daily       risperiDONE (RISPERDAL) 0.25 MG tablet Take 1 tablet (0.25 mg) by mouth daily And daily PRN       vitamin D3 (CHOLECALCIFEROL) 1000 units (25 mcg) tablet Take 1 tablet (1,000 Units) by mouth daily 90 tablet 3       REVIEW OF SYSTEMS:  Unobtainable secondary to cognitive impairment.     Objective:  Temp 97.1  F (36.2  C)   Ht 1.575 m (5' 2\")   Wt 41.3 kg (91 lb)   SpO2 97%   BMI 16.64 kg/m    Exam:  GENERAL APPEARANCE:  Alert, in no distress, pleasant, cooperative  EYES: no discharge or mattering on lids or lashes noted  ENT:  moist mucous membranes, hearing acuity intact  NECK: supple, symmetrical  RESP: no respiratory distress, Lung sounds clear, patient is on room air  CV:  rate and rhythm regular, no murmur. Edema 1+ in bilateral lower extremities. VASCULAR: warm extremities without open areas.  ABDOMEN: normal bowel sounds, soft, nontender.  M/S:   Gait and station: ambulates independently without the use of an aid, no tenderness or swelling of the joints; able to move all extremities   SKIN:  Inspection and " palpation of skin and subcutaneous tissue: skin warm, dry and intact without rashes  NEURO: no facial asymmetry, no speech deficits and able to follow directions, moves all extremities symmetrically  PSYCH:  insight, judgement, and memory impaired affect and mood normal    Labs:   CBC RESULTS:   Recent Labs   Lab Test 04/06/21 07/21/20   WBC 5.8 5.7   RBC 4.46 4.81   HGB 14.2 15.1   HCT 45.4 48.4*   * 101*   MCH 31.8 31.4   MCHC 31.3* 31.2*   RDW 14.1 13.5    201       Last Basic Metabolic Panel:  Recent Labs   Lab Test 04/06/21 07/21/20    141   POTASSIUM 3.8 3.6   CHLORIDE 101 101   RAEANN 9.2 9.7   CO2 31 30   BUN 14 16   CR 0.91 0.84    67*       Liver Function Studies -   Recent Labs   Lab Test 04/06/21 08/23/19  1527   PROTTOTAL 6.3 6.5*   ALBUMIN 3.5 3.6   BILITOTAL 0.7 0.4   ALKPHOS 69 69   AST 28 35   ALT 24 37       TSH   Date Value Ref Range Status   03/16/2021 0.97 0.30 - 5.00 uIU/mL Final   07/21/2020 1.82 0.30 - 5.00 uIU/mL Final     ASSESSMENT/PLAN:  (F03.90) Dementia without behavioral disturbance, unspecified dementia type (H)  (primary encounter diagnosis)  Comment: [place]ums 18/30 upon admission to AL and now 5/30 in 9/2020. Appears that dementia has been progressing now requiring more care, and paranoia surrounding medication administration. Risperidone was ordered on 3/30 but not received from pharmacy so nursing is working on that as the risperidone would likely help with the paranoia and she would then continue with her daily medications. Also of note, she has urinary incontinence, unknown if it is new and ongoing so will connect with nursing to determine the frequency and if it is continuing then will send a UA to rule out infection. If no infection, likely due to progression of dementia.   Plan:   -- risperidone 0.25 mg ODT daily and daily PRN  --continue with memory care assisted living for assistance with cares, meals and medications.      (I47.1) SVT (H)  (I42.9)  Cardiomyopathy, unspecified type (H)  (I10) Hypertension  Comment: EF 46%, mild -mod TR and mild pulmonary HTN regular rhythm today. HR 96 last week and was better controlled today at 82.   Plan: continue with metoprolol 25 mg daily.   --lisinopril 7.5 mg every evening  --Continue to monitor blood pressure and heart rate, adjust medications as needed.     (E44.0) Moderate protein-calorie malnutrition (H)  Comment: weight 96 on admission in 2019 and has been up to 104 and now down to 91 lbs  Plan: preferences, assist to meals, Magic Cup supplement bid.  --have asked staff to obtain another weight to determine if there has been further loss in two weeks.      (E03.9) Hypothyroidism, unspecified type  Comment: last TSH was 0.97 so levothyroxine dose was decreased last week.   Plan: continue with levothyroxine 50 mcg daily  --recheck TSH in 8 weeks    Total time with patient visit: 65 minutes including discussions about the POC and care coordination with the patient, family, Frank (23 minutes) and coordinating with facility staff. Greater than 50% of total time spent with counseling and coordinating care due to decline, weight loss, hospice discussions.    Electronically signed by:  MARTY Oviedo CNP             Sincerely,        MARTY Oviedo CNP

## 2021-04-13 NOTE — PROGRESS NOTES
"[place] [facility]  [place] Medical Record Number:  [id number]  Place of Service where encounter took place:  [facility] [[id number]]  Chief Complaint   Patient presents with     RECHECK       HPI:    Frances Alexander  is a 85 year old (1935), who is being seen today for an episodic care visit.  HPI information obtained from: facility chart records, facility staff, patient report and [facility] chart review.    Ms Alexander was visited today while in her room. She was just using the restroom, her \"baby\" was sitting on the chair, tucked in with blanket. Frances smiled at the baby. She was unable to follow conversation but did say \"I got the tierra\" as she noted her clothes were the same color as the clothes I was wearing.     Staff reports that she continues to decline medications due to paranoia. Risperidone ordered on 3/31 but not received from pharmacy so staff will look into that. Appetite is still marginal. Had an incontinence episode on 4/5.     Spoke with son, Frank for 23 minutes. We discussed that the blood work completed did not have any abnormalities that would have caused Frances to have a change in cognition. She does have some urinary incontinence which Frank believes is new so we discussed if this is new and has been consistent, a urine sample should be obtained to rule out an infection, he believes this was a one time episode. We again discussed hospice. He was able to connect with his sister whom lives in [place]. They discussed it and would like to get more information so a referral will be made to the hospice team so a virtual visit can be scheduled (a time for [place] and [place]).      Past Medical and Surgical History reviewed in Epic today.    MEDICATIONS:    Current Outpatient Medications   Medication Sig Dispense Refill     brimonidine (ALPHAGAN) 0.2 % ophthalmic solution Place 1 drop into both eyes 2 times daily 1 Bottle 3     dorzolamide-timolol " "PF (COSOPT) 22.3-6.8 MG/ML opthalmic solution 1 drop 2 times daily       latanoprost (XALATAN) 0.005 % ophthalmic solution Place 1 drop into the right eye At Bedtime        levothyroxine (SYNTHROID/LEVOTHROID) 125 MCG tablet TAKE ONE-HALF TABLET (62.5MCG) BY MOUTH ONCE DAILY 14 tablet 97     lisinopril (ZESTRIL) 2.5 MG tablet TAKE 1 TABLET BY MOUTH EVERY EVENING 28 tablet 97     lisinopril (ZESTRIL) 5 MG tablet TAKE 1 TABLET BY MOUTH EVERY MORNING 28 tablet 97     LUBRICATING EYE DROPS 0.4-0.3 % SOLN ophthalmic solution PLACE DROP(S) INTO AFFECTED EYE(S) 2 TO 4 TIMES DAILY AS NEEDED FOR DRYNESS 15 mL 97     metoprolol succinate ER (TOPROL-XL) 25 MG 24 hr tablet Take 1 tablet (25 mg) by mouth daily 16 tablet 11     Nutritional Supplements (NUTRITIONAL SUPPLEMENT PO) Take by mouth daily       risperiDONE (RISPERDAL) 0.25 MG tablet Take 1 tablet (0.25 mg) by mouth daily And daily PRN       vitamin D3 (CHOLECALCIFEROL) 1000 units (25 mcg) tablet Take 1 tablet (1,000 Units) by mouth daily 90 tablet 3       REVIEW OF SYSTEMS:  Unobtainable secondary to cognitive impairment.     Objective:  Temp 97.1  F (36.2  C)   Ht 1.575 m (5' 2\")   Wt 41.3 kg (91 lb)   SpO2 97%   BMI 16.64 kg/m    Exam:  GENERAL APPEARANCE:  Alert, in no distress, pleasant, cooperative  EYES: no discharge or mattering on lids or lashes noted  ENT:  moist mucous membranes, hearing acuity intact  NECK: supple, symmetrical  RESP: no respiratory distress, Lung sounds clear, patient is on room air  CV:  rate and rhythm regular, no murmur. Edema 1+ in bilateral lower extremities. VASCULAR: warm extremities without open areas.  ABDOMEN: normal bowel sounds, soft, nontender.  M/S:   Gait and station: ambulates independently without the use of an aid, no tenderness or swelling of the joints; able to move all extremities   SKIN:  Inspection and palpation of skin and subcutaneous tissue: skin warm, dry and intact without rashes  NEURO: no facial asymmetry, no " speech deficits and able to follow directions, moves all extremities symmetrically  PSYCH:  insight, judgement, and memory impaired affect and mood normal    Labs:   CBC RESULTS:   Recent Labs   Lab Test 04/06/21 07/21/20   WBC 5.8 5.7   RBC 4.46 4.81   HGB 14.2 15.1   HCT 45.4 48.4*   * 101*   MCH 31.8 31.4   MCHC 31.3* 31.2*   RDW 14.1 13.5    201       Last Basic Metabolic Panel:  Recent Labs   Lab Test 04/06/21 07/21/20    141   POTASSIUM 3.8 3.6   CHLORIDE 101 101   RAEANN 9.2 9.7   CO2 31 30   BUN 14 16   CR 0.91 0.84    67*       Liver Function Studies -   Recent Labs   Lab Test 04/06/21 08/23/19  1527   PROTTOTAL 6.3 6.5*   ALBUMIN 3.5 3.6   BILITOTAL 0.7 0.4   ALKPHOS 69 69   AST 28 35   ALT 24 37       TSH   Date Value Ref Range Status   03/16/2021 0.97 0.30 - 5.00 uIU/mL Final   07/21/2020 1.82 0.30 - 5.00 uIU/mL Final     ASSESSMENT/PLAN:  (F03.90) Dementia without behavioral disturbance, unspecified dementia type (H)  (primary encounter diagnosis)  Comment: [place]ums 18/30 upon admission to AL and now 5/30 in 9/2020. Appears that dementia has been progressing now requiring more care, and paranoia surrounding medication administration. Risperidone was ordered on 3/30 but not received from pharmacy so nursing is working on that as the risperidone would likely help with the paranoia and she would then continue with her daily medications. Also of note, she has urinary incontinence, unknown if it is new and ongoing so will connect with nursing to determine the frequency and if it is continuing then will send a UA to rule out infection. If no infection, likely due to progression of dementia.   Plan:   -- risperidone 0.25 mg ODT daily and daily PRN  --continue with memory care assisted living for assistance with cares, meals and medications.      (I47.1) SVT (H)  (I42.9) Cardiomyopathy, unspecified type (H)  (I10) Hypertension  Comment: EF 46%, mild -mod TR and mild pulmonary HTN regular  rhythm today. HR 96 last week and was better controlled today at 82.   Plan: continue with metoprolol 25 mg daily.   --lisinopril 7.5 mg every evening  --Continue to monitor blood pressure and heart rate, adjust medications as needed.     (E44.0) Moderate protein-calorie malnutrition (H)  Comment: weight 96 on admission in 2019 and has been up to 104 and now down to 91 lbs  Plan: preferences, assist to meals, Magic Cup supplement bid.  --have asked staff to obtain another weight to determine if there has been further loss in two weeks.      (E03.9) Hypothyroidism, unspecified type  Comment: last TSH was 0.97 so levothyroxine dose was decreased last week.   Plan: continue with levothyroxine 50 mcg daily  --recheck TSH in 8 weeks    Total time with patient visit: 65 minutes including discussions about the POC and care coordination with the patient, family, Frank (23 minutes) and coordinating with facility staff. Greater than 50% of total time spent with counseling and coordinating care due to decline, weight loss, hospice discussions.    Electronically signed by:  MARTY Oviedo CNP

## 2021-04-25 ENCOUNTER — HEALTH MAINTENANCE LETTER (OUTPATIENT)
Age: 86
End: 2021-04-25

## 2021-05-09 ENCOUNTER — RECORDS - HEALTHEAST (OUTPATIENT)
Dept: LAB | Facility: CLINIC | Age: 86
End: 2021-05-09

## 2021-05-12 ENCOUNTER — ASSISTED LIVING VISIT (OUTPATIENT)
Dept: GERIATRICS | Facility: CLINIC | Age: 86
End: 2021-05-12
Payer: COMMERCIAL

## 2021-05-12 VITALS
RESPIRATION RATE: 18 BRPM | WEIGHT: 93 LBS | BODY MASS INDEX: 17.11 KG/M2 | HEIGHT: 62 IN | TEMPERATURE: 98.7 F | OXYGEN SATURATION: 95 %

## 2021-05-12 DIAGNOSIS — I10 ESSENTIAL HYPERTENSION: ICD-10-CM

## 2021-05-12 DIAGNOSIS — Z51.5 HOSPICE CARE PATIENT: ICD-10-CM

## 2021-05-12 DIAGNOSIS — G30.1 LATE ONSET ALZHEIMER'S DISEASE WITHOUT BEHAVIORAL DISTURBANCE (H): ICD-10-CM

## 2021-05-12 DIAGNOSIS — I42.9 CARDIOMYOPATHY, UNSPECIFIED TYPE (H): ICD-10-CM

## 2021-05-12 DIAGNOSIS — R60.0 LOWER EXTREMITY EDEMA: Primary | ICD-10-CM

## 2021-05-12 DIAGNOSIS — F02.80 LATE ONSET ALZHEIMER'S DISEASE WITHOUT BEHAVIORAL DISTURBANCE (H): ICD-10-CM

## 2021-05-12 RX ORDER — MORPHINE SULFATE 30 MG/1
2.5 TABLET ORAL
COMMUNITY
End: 2022-01-01

## 2021-05-12 RX ORDER — HALOPERIDOL 0.5 MG/1
0.5 TABLET ORAL EVERY 6 HOURS PRN
COMMUNITY
End: 2022-01-01

## 2021-05-12 RX ORDER — SENNOSIDES 8.6 MG
1 TABLET ORAL 2 TIMES DAILY PRN
COMMUNITY
End: 2023-01-01

## 2021-05-12 RX ORDER — ACETAMINOPHEN 650 MG/1
650 SUPPOSITORY RECTAL DAILY PRN
COMMUNITY
End: 2023-01-01

## 2021-05-12 RX ORDER — LORAZEPAM 0.5 MG/1
0.25 TABLET ORAL EVERY 4 HOURS PRN
COMMUNITY
End: 2022-01-01

## 2021-05-12 RX ORDER — FUROSEMIDE 20 MG
30 TABLET ORAL DAILY
COMMUNITY
Start: 2021-05-12 | End: 2021-11-08 | Stop reason: DRUGHIGH

## 2021-05-12 RX ORDER — BISACODYL 10 MG
10 SUPPOSITORY, RECTAL RECTAL DAILY PRN
COMMUNITY

## 2021-05-12 RX ORDER — LISINOPRIL 5 MG/1
5 TABLET ORAL EVERY EVENING
COMMUNITY
End: 2021-11-08

## 2021-05-12 ASSESSMENT — MIFFLIN-ST. JEOR: SCORE: 820.1

## 2021-05-12 NOTE — PROGRESS NOTES
"[place] [facility]  [place] Medical Record Number:  [id number]  Place of Service where encounter took place:  [facility] [[id number]]  Chief Complaint   Patient presents with     RECHECK       HPI:    Frances Alexander  is a 85 year old (1935), who is being seen today for an episodic care visit.  HPI information obtained from: facility chart records, facility staff, patient report and [facility] chart review.    Ms Alexander was visited today while in the dining room. Received a call from hospice RN earlier this week in regards to new onset lower extremity edema in which we are currently doing a short stint of lasix. Today Frances has nylon stockings in place, \"it's cuttin off the blood supply, isn't it?\" she denies pain. Talks about a party throughout the visit, then mentions her son's Marisa name. Appears she is talking about her son's graduation party.  She has been taking meds on and off, and has periods of feeling tired and sleeping more.     Past Medical and Surgical History reviewed in Epic today.    MEDICATIONS:    Current Outpatient Medications   Medication Sig Dispense Refill     acetaminophen (TYLENOL) 650 MG suppository Place 650 mg rectally daily as needed for fever       atropine 1 % SOLN Place 2 drops under the tongue every 2 hours as needed for secretions       bisacodyl (DULCOLAX) 10 MG suppository Place 10 mg rectally daily as needed for constipation       brimonidine (ALPHAGAN) 0.2 % ophthalmic solution Place 1 drop into both eyes 2 times daily 1 Bottle 3     dorzolamide-timolol PF (COSOPT) 22.3-6.8 MG/ML opthalmic solution 1 drop 2 times daily       furosemide (LASIX) 20 MG tablet Take 20 mg by mouth daily       haloperidol (HALDOL) 0.5 MG tablet Take 0.5 mg by mouth every 6 hours as needed for agitation       levothyroxine (SYNTHROID/LEVOTHROID) 125 MCG tablet TAKE ONE-HALF TABLET (62.5MCG) BY MOUTH ONCE DAILY 14 tablet 97     lisinopril " "(ZESTRIL) 2.5 MG tablet TAKE 1 TABLET BY MOUTH EVERY EVENING 28 tablet 97     lisinopril (ZESTRIL) 5 MG tablet Take 5 mg by mouth every evening Give with 2.5 mg tablet for a total of 7.5 mg       LORazepam (ATIVAN) 0.5 MG tablet Take 0.25 mg by mouth every 4 hours as needed for anxiety       LUBRICATING EYE DROPS 0.4-0.3 % SOLN ophthalmic solution PLACE DROP(S) INTO AFFECTED EYE(S) 2 TO 4 TIMES DAILY AS NEEDED FOR DRYNESS 15 mL 97     metoprolol succinate ER (TOPROL-XL) 25 MG 24 hr tablet Take 1 tablet (25 mg) by mouth daily 16 tablet 11     morphine 2.5 MG solu-tab Take 2.5 mg by mouth every 2 hours as needed for shortness of breath / dyspnea or moderate to severe pain       Nutritional Supplements (NUTRITIONAL SUPPLEMENT PO) Take by mouth daily       risperiDONE (RISPERDAL) 0.25 MG tablet Take 1 tablet (0.25 mg) by mouth daily And daily PRN       sennosides (SENOKOT) 8.6 MG tablet Take 1 tablet by mouth 2 times daily as needed for constipation       UNABLE TO FIND Place 1 drop into the right eye At Bedtime [facility]       latanoprost (XALATAN) 0.005 % ophthalmic solution Place 1 drop into the right eye At Bedtime        lisinopril (ZESTRIL) 5 MG tablet TAKE 1 TABLET BY MOUTH EVERY MORNING 28 tablet 97     vitamin D3 (CHOLECALCIFEROL) 1000 units (25 mcg) tablet Take 1 tablet (1,000 Units) by mouth daily 90 tablet 3       REVIEW OF SYSTEMS:  Unobtainable secondary to cognitive impairment.     Objective:  Temp 98.7  F (37.1  C)   Resp 18   Ht 1.575 m (5' 2\")   Wt 42.2 kg (93 lb)   SpO2 95%   BMI 17.01 kg/m    Exam:  GENERAL APPEARANCE:  Alert, in no distress, pleasant, cooperative  EYES: no discharge or mattering on lids or lashes noted  ENT:  moist mucous membranes, hearing acuity intact  NECK: supple, symmetrical  RESP: no respiratory distress, Lung sounds clear, patient is on room air  CV:  rate and rhythm regular, no murmur. Edema 2+in bilateral lower extremities. VASCULAR: warm extremities without open " areas.  ABDOMEN: normal bowel sounds, soft, nontender.  M/S:   Gait and station: ambulates independently without the use of an aid, no tenderness or swelling of the joints; able to move all extremities   SKIN:  Inspection and palpation of skin and subcutaneous tissue: skin warm, dry and intact without rashes  NEURO: no facial asymmetry, no speech deficits and able to follow directions, moves all extremities symmetrically  PSYCH:  insight, judgement, and memory impaired affect and mood normal    Labs:   CBC RESULTS:   Recent Labs   Lab Test 04/06/21 07/21/20   WBC 5.8 5.7   RBC 4.46 4.81   HGB 14.2 15.1   HCT 45.4 48.4*   * 101*   MCH 31.8 31.4   MCHC 31.3* 31.2*   RDW 14.1 13.5    201       Last Basic Metabolic Panel:  Recent Labs   Lab Test 04/06/21 07/21/20    141   POTASSIUM 3.8 3.6   CHLORIDE 101 101   RAEANN 9.2 9.7   CO2 31 30   BUN 14 16   CR 0.91 0.84    67*       Liver Function Studies -   Recent Labs   Lab Test 04/06/21 08/23/19  1527   PROTTOTAL 6.3 6.5*   ALBUMIN 3.5 3.6   BILITOTAL 0.7 0.4   ALKPHOS 69 69   AST 28 35   ALT 24 37       TSH   Date Value Ref Range Status   03/16/2021 0.97 0.30 - 5.00 uIU/mL Final   07/21/2020 1.82 0.30 - 5.00 uIU/mL Final     ASSESSMENT/PLAN:  (I42.9) Cardiomyopathy, unspecified type (H)  (I10) Hypertension  (R60.0) lower extremity edema  Comment: EF 46%, mild -mod TR and mild pulmonary HTN regular rhythm today. HR 96 today. She has new onset of 2+ edema so heart disease is likely worsening. She received a dose of lasix yesterday as well as this morning and it is unclear if this has been beneficial.   Plan:   --will finish out last day of lasix tomorrow and hospice RN to reassess. She has variable oral intake so using lasix may dry her out so will be cautious. If heart is failing, we may need to start a low dose daily lasix to reduce work load.   --hospice to put on TEDS.   --continue with metoprolol 25 mg daily.   --lisinopril 7.5 mg every  evening    (F03.90) Dementia without behavioral disturbance, unspecified dementia type (H)  (primary encounter diagnosis)  Comment: [place] 18/30 upon admission to AL and now 5/30 in 9/2020. Appears that dementia has been progressing now requiring more care, and paranoia surrounding medication administration. She has been on risperidone daily which has helped with some of the paranoia. She does appear to be declining further. Daughter who lives in [place] will be coming next week and able to stay for three weeks.   Plan:   -- risperidone 0.25 mg ODT daily and daily PRN  --PRN anxiety and morphine.   --continue with memory care assisted living for assistance with cares, meals and medications.      Electronically signed by:  MARTY Oviedo CNP

## 2021-05-12 NOTE — LETTER
"    5/12/2021        RE: Frances Alexander  [facility]  [address]  [facility] [address]        [place] [facility]  [place] Medical Record Number:  [id number]  Place of Service where encounter took place:  [facility] [[id number]]  Chief Complaint   Patient presents with     RECHECK       HPI:    Frances Alexander  is a 85 year old (1935), who is being seen today for an episodic care visit.  HPI information obtained from: facility chart records, facility staff, patient report and [facility] chart review.    Ms Alexander was visited today while in the dining room. Received a call from hospice RN earlier this week in regards to new onset lower extremity edema in which we are currently doing a short stint of lasix. Today Frances has nylon stockings in place, \"it's cuttin off the blood supply, isn't it?\" she denies pain. Talks about a party throughout the visit, then mentions her son's Marisa name. Appears she is talking about her son's graduation party.  She has been taking meds on and off, and has periods of feeling tired and sleeping more.     Past Medical and Surgical History reviewed in Epic today.    MEDICATIONS:    Current Outpatient Medications   Medication Sig Dispense Refill     acetaminophen (TYLENOL) 650 MG suppository Place 650 mg rectally daily as needed for fever       atropine 1 % SOLN Place 2 drops under the tongue every 2 hours as needed for secretions       bisacodyl (DULCOLAX) 10 MG suppository Place 10 mg rectally daily as needed for constipation       brimonidine (ALPHAGAN) 0.2 % ophthalmic solution Place 1 drop into both eyes 2 times daily 1 Bottle 3     dorzolamide-timolol PF (COSOPT) 22.3-6.8 MG/ML opthalmic solution 1 drop 2 times daily       furosemide (LASIX) 20 MG tablet Take 20 mg by mouth daily       haloperidol (HALDOL) 0.5 MG tablet Take 0.5 mg by mouth every 6 hours as needed for agitation       levothyroxine " "(SYNTHROID/LEVOTHROID) 125 MCG tablet TAKE ONE-HALF TABLET (62.5MCG) BY MOUTH ONCE DAILY 14 tablet 97     lisinopril (ZESTRIL) 2.5 MG tablet TAKE 1 TABLET BY MOUTH EVERY EVENING 28 tablet 97     lisinopril (ZESTRIL) 5 MG tablet Take 5 mg by mouth every evening Give with 2.5 mg tablet for a total of 7.5 mg       LORazepam (ATIVAN) 0.5 MG tablet Take 0.25 mg by mouth every 4 hours as needed for anxiety       LUBRICATING EYE DROPS 0.4-0.3 % SOLN ophthalmic solution PLACE DROP(S) INTO AFFECTED EYE(S) 2 TO 4 TIMES DAILY AS NEEDED FOR DRYNESS 15 mL 97     metoprolol succinate ER (TOPROL-XL) 25 MG 24 hr tablet Take 1 tablet (25 mg) by mouth daily 16 tablet 11     morphine 2.5 MG solu-tab Take 2.5 mg by mouth every 2 hours as needed for shortness of breath / dyspnea or moderate to severe pain       Nutritional Supplements (NUTRITIONAL SUPPLEMENT PO) Take by mouth daily       risperiDONE (RISPERDAL) 0.25 MG tablet Take 1 tablet (0.25 mg) by mouth daily And daily PRN       sennosides (SENOKOT) 8.6 MG tablet Take 1 tablet by mouth 2 times daily as needed for constipation       UNABLE TO FIND Place 1 drop into the right eye At Bedtime [facility]       latanoprost (XALATAN) 0.005 % ophthalmic solution Place 1 drop into the right eye At Bedtime        lisinopril (ZESTRIL) 5 MG tablet TAKE 1 TABLET BY MOUTH EVERY MORNING 28 tablet 97     vitamin D3 (CHOLECALCIFEROL) 1000 units (25 mcg) tablet Take 1 tablet (1,000 Units) by mouth daily 90 tablet 3       REVIEW OF SYSTEMS:  Unobtainable secondary to cognitive impairment.     Objective:  Temp 98.7  F (37.1  C)   Resp 18   Ht 1.575 m (5' 2\")   Wt 42.2 kg (93 lb)   SpO2 95%   BMI 17.01 kg/m    Exam:  GENERAL APPEARANCE:  Alert, in no distress, pleasant, cooperative  EYES: no discharge or mattering on lids or lashes noted  ENT:  moist mucous membranes, hearing acuity intact  NECK: supple, symmetrical  RESP: no respiratory distress, Lung sounds clear, patient is on room air  CV: "  rate and rhythm regular, no murmur. Edema 2+in bilateral lower extremities. VASCULAR: warm extremities without open areas.  ABDOMEN: normal bowel sounds, soft, nontender.  M/S:   Gait and station: ambulates independently without the use of an aid, no tenderness or swelling of the joints; able to move all extremities   SKIN:  Inspection and palpation of skin and subcutaneous tissue: skin warm, dry and intact without rashes  NEURO: no facial asymmetry, no speech deficits and able to follow directions, moves all extremities symmetrically  PSYCH:  insight, judgement, and memory impaired affect and mood normal    Labs:   CBC RESULTS:   Recent Labs   Lab Test 04/06/21 07/21/20   WBC 5.8 5.7   RBC 4.46 4.81   HGB 14.2 15.1   HCT 45.4 48.4*   * 101*   MCH 31.8 31.4   MCHC 31.3* 31.2*   RDW 14.1 13.5    201       Last Basic Metabolic Panel:  Recent Labs   Lab Test 04/06/21 07/21/20    141   POTASSIUM 3.8 3.6   CHLORIDE 101 101   RAEANN 9.2 9.7   CO2 31 30   BUN 14 16   CR 0.91 0.84    67*       Liver Function Studies -   Recent Labs   Lab Test 04/06/21 08/23/19  1527   PROTTOTAL 6.3 6.5*   ALBUMIN 3.5 3.6   BILITOTAL 0.7 0.4   ALKPHOS 69 69   AST 28 35   ALT 24 37       TSH   Date Value Ref Range Status   03/16/2021 0.97 0.30 - 5.00 uIU/mL Final   07/21/2020 1.82 0.30 - 5.00 uIU/mL Final     ASSESSMENT/PLAN:  (I42.9) Cardiomyopathy, unspecified type (H)  (I10) Hypertension  (R60.0) lower extremity edema  Comment: EF 46%, mild -mod TR and mild pulmonary HTN regular rhythm today. HR 96 today. She has new onset of 2+ edema so heart disease is likely worsening. She received a dose of lasix yesterday as well as this morning and it is unclear if this has been beneficial.   Plan:   --will finish out last day of lasix tomorrow and hospice RN to reassess. She has variable oral intake so using lasix may dry her out so will be cautious. If heart is failing, we may need to start a low dose daily lasix to reduce  work load.   --hospice to put on TEDS.   --continue with metoprolol 25 mg daily.   --lisinopril 7.5 mg every evening    (F03.90) Dementia without behavioral disturbance, unspecified dementia type (H)  (primary encounter diagnosis)  Comment: Slums 18/30 upon admission to AL and now 5/30 in 9/2020. Appears that dementia has been progressing now requiring more care, and paranoia surrounding medication administration. She has been on risperidone daily which has helped with some of the paranoia. She does appear to be declining further. Daughter who lives in [place] will be coming next week and able to stay for three weeks.   Plan:   -- risperidone 0.25 mg ODT daily and daily PRN  --PRN anxiety and morphine.   --continue with memory care assisted living for assistance with cares, meals and medications.      Electronically signed by:  MARTY Oviedo CNP               Sincerely,        MARTY Oviedo CNP

## 2021-05-14 ENCOUNTER — RECORDS - HEALTHEAST (OUTPATIENT)
Dept: LAB | Facility: CLINIC | Age: 86
End: 2021-05-14

## 2021-05-17 ENCOUNTER — RECORDS - HEALTHEAST (OUTPATIENT)
Dept: LAB | Facility: CLINIC | Age: 86
End: 2021-05-17

## 2021-05-20 ENCOUNTER — RECORDS - HEALTHEAST (OUTPATIENT)
Dept: LAB | Facility: CLINIC | Age: 86
End: 2021-05-20

## 2021-05-25 LAB — TSH SERPL DL<=0.005 MIU/L-ACNC: 13.6 UIU/ML (ref 0.3–5)

## 2021-06-08 ENCOUNTER — RECORDS - HEALTHEAST (OUTPATIENT)
Dept: LAB | Facility: CLINIC | Age: 86
End: 2021-06-08

## 2021-06-09 LAB — TSH SERPL DL<=0.005 MIU/L-ACNC: 2.66 UIU/ML (ref 0.3–5)

## 2021-06-15 DIAGNOSIS — E03.9 HYPOTHYROIDISM, UNSPECIFIED TYPE: Primary | ICD-10-CM

## 2021-06-15 RX ORDER — LEVOTHYROXINE SODIUM 75 UG/1
TABLET ORAL
Qty: 28 TABLET | Refills: 10 | Status: SHIPPED | OUTPATIENT
Start: 2021-06-15 | End: 2022-01-01

## 2021-07-01 DIAGNOSIS — H40.003 GLAUCOMA SUSPECT, BILATERAL: Primary | ICD-10-CM

## 2021-07-01 RX ORDER — NETARSUDIL 0.2 MG/ML
SOLUTION/ DROPS OPHTHALMIC; TOPICAL
Qty: 2.5 ML | Refills: 10 | Status: SHIPPED | OUTPATIENT
Start: 2021-07-01

## 2021-07-01 RX ORDER — LATANOPROST 50 UG/ML
SOLUTION/ DROPS OPHTHALMIC
Qty: 2.5 ML | Refills: 10 | Status: SHIPPED | OUTPATIENT
Start: 2021-07-01

## 2021-07-17 NOTE — TELEPHONE ENCOUNTER
Critical Care Progress Note        NAME: Rusty Whitten - ROOM: 615/193-X - MRN: [id number] - Age: 61year old - : 1960  Date of Admission: 2021  6:06 AM  Admission Diagnosis: Urinary retention [R33.9]  MUMTAZ (acute kidney injury) ([facility] [address] [J29. Given that her left ventricular systolic function is mildly reduced, I would recommend she start toprol XL 12.5 daily and get a repeat echo and DAISHA visit in 3  Months. I would just let her know  That this will hopefully suppress her premature beats and also potentially help her cardiac function. Thanks.    Glucose-Vitamin C, acetaminophen, vasopressin (PITRESSIN) infusion for shock     Lungs: clear to auscultation bilaterally  Heart: S1, S2 normal, no murmur, click, rub or gallop, regular rate and rhythm  Abdomen: +hernia appreciated  Extremities: edema 1+ i

## 2021-07-21 ENCOUNTER — ASSISTED LIVING VISIT (OUTPATIENT)
Dept: GERIATRICS | Facility: CLINIC | Age: 86
End: 2021-07-21
Payer: MEDICARE

## 2021-07-21 VITALS — HEIGHT: 62 IN | BODY MASS INDEX: 18.33 KG/M2 | WEIGHT: 99.6 LBS | TEMPERATURE: 97.3 F | OXYGEN SATURATION: 98 %

## 2021-07-21 DIAGNOSIS — R60.0 LOWER EXTREMITY EDEMA: ICD-10-CM

## 2021-07-21 DIAGNOSIS — I42.9 CARDIOMYOPATHY, UNSPECIFIED TYPE (H): ICD-10-CM

## 2021-07-21 DIAGNOSIS — G30.1 LATE ONSET ALZHEIMER'S DISEASE WITHOUT BEHAVIORAL DISTURBANCE (H): ICD-10-CM

## 2021-07-21 DIAGNOSIS — F02.80 LATE ONSET ALZHEIMER'S DISEASE WITHOUT BEHAVIORAL DISTURBANCE (H): ICD-10-CM

## 2021-07-21 DIAGNOSIS — Z51.5 HOSPICE CARE PATIENT: Primary | ICD-10-CM

## 2021-07-21 DIAGNOSIS — I10 ESSENTIAL HYPERTENSION: ICD-10-CM

## 2021-07-21 PROCEDURE — 99207 PR CDG-MDM COMPONENT: MEETS MODERATE - UP CODED: CPT | Performed by: NURSE PRACTITIONER

## 2021-07-21 ASSESSMENT — MIFFLIN-ST. JEOR: SCORE: 845.03

## 2021-07-21 NOTE — LETTER
7/21/2021        RE: Frances Alexander  [facility]  [address]  [facility] [address]        [place] [facility]  [place] Medical Record Number:  [id number]  Place of Service where encounter took place:  [facility]) [[id number]]  Chief Complaint   Patient presents with     RECHECK     Routine       HPI:    Frances Alexander  is a 85 year old (1935), who is being seen today for an episodic care visit.  HPI information obtained from: facility chart records, facility staff, patient report and [facility] chart review.    Ms Alexander was visited today while in the dining room. She participates in the conversation, able to track and answer appropriately at times but others, speaks about other things. She smiles and appears comfortable. Staff does not have concerns at this time.     Past Medical and Surgical History reviewed in Epic today.    MEDICATIONS:    Current Outpatient Medications   Medication Sig Dispense Refill     acetaminophen (TYLENOL) 650 MG suppository Place 650 mg rectally daily as needed for fever       atropine 1 % SOLN Place 2 drops under the tongue every 2 hours as needed for secretions       bisacodyl (DULCOLAX) 10 MG suppository Place 10 mg rectally daily as needed for constipation       brimonidine (ALPHAGAN) 0.2 % ophthalmic solution Place 1 drop into both eyes 2 times daily 1 Bottle 3     dorzolamide-timolol PF (COSOPT) 22.3-6.8 MG/ML opthalmic solution 1 drop 2 times daily       furosemide (LASIX) 20 MG tablet Take 20 mg by mouth daily       haloperidol (HALDOL) 0.5 MG tablet Take 0.5 mg by mouth every 6 hours as needed for agitation       latanoprost (XALATAN) 0.005 % ophthalmic solution INSTILL 1 DROP INTO RIGHT EYE EVERY DAY AT BEDTIME *ALLOW FOR 5 MINUTES IN BETWEEN EYE DROPS* 2.5 mL 10     levothyroxine (SYNTHROID/LEVOTHROID) 125 MCG tablet TAKE ONE-HALF TABLET (62.5MCG) BY MOUTH ONCE DAILY 14 tablet 97     levothyroxine (SYNTHROID/LEVOTHROID) 75 MCG  "tablet 1 TABLET ORALLY DAILY (DX: HYPOTHYROIDISM) 28 tablet 10     lisinopril (ZESTRIL) 2.5 MG tablet TAKE 1 TABLET BY MOUTH EVERY EVENING 28 tablet 97     lisinopril (ZESTRIL) 5 MG tablet Take 5 mg by mouth every evening Give with 2.5 mg tablet for a total of 7.5 mg       lisinopril (ZESTRIL) 5 MG tablet TAKE 1 TABLET BY MOUTH EVERY MORNING 28 tablet 97     LORazepam (ATIVAN) 0.5 MG tablet Take 0.25 mg by mouth every 4 hours as needed for anxiety       LUBRICATING EYE DROPS 0.4-0.3 % SOLN ophthalmic solution PLACE DROP(S) INTO AFFECTED EYE(S) 2 TO 4 TIMES DAILY AS NEEDED FOR DRYNESS 15 mL 97     metoprolol succinate ER (TOPROL-XL) 25 MG 24 hr tablet Take 1 tablet (25 mg) by mouth daily 16 tablet 11     morphine 2.5 MG solu-tab Take 2.5 mg by mouth every 2 hours as needed for shortness of breath / dyspnea or moderate to severe pain       Nutritional Supplements (NUTRITIONAL SUPPLEMENT PO) Take by mouth daily       RHOPRESSA 0.02 % ophthalmic solution INSTILL 1 DROP INTO RIGHT EYE EVERY DAY AT BEDTIME *ALLOW FOR 5 MINUTES IN BETWEEN EYE DROPS* 2.5 mL 10     risperiDONE (RISPERDAL) 0.25 MG tablet Take 1 tablet (0.25 mg) by mouth daily And daily PRN       sennosides (SENOKOT) 8.6 MG tablet Take 1 tablet by mouth 2 times daily as needed for constipation       UNABLE TO FIND Place 1 drop into the right eye At Bedtime [facility]       vitamin D3 (CHOLECALCIFEROL) 1000 units (25 mcg) tablet Take 1 tablet (1,000 Units) by mouth daily 90 tablet 3       REVIEW OF SYSTEMS:  Unobtainable secondary to cognitive impairment.     Objective:  Temp 97.3  F (36.3  C)   Ht 1.575 m (5' 2\")   Wt 45.2 kg (99 lb 9.6 oz)   SpO2 98%   BMI 18.22 kg/m    Exam:  GENERAL APPEARANCE:  Alert, in no distress, pleasant, cooperative  EYES: no discharge or mattering on lids or lashes noted  ENT:  moist mucous membranes, hearing acuity intact  NECK: supple, symmetrical  RESP: no respiratory distress, Lung sounds clear, patient is on room air  CV: "  rate and rhythm regular, no murmur. Edema 1+in bilateral lower extremities. VASCULAR: warm extremities without open areas.  M/S:   Gait and station: ambulates independently without the use of an aid, no tenderness or swelling of the joints; able to move all extremities   SKIN:  Inspection and palpation of skin and subcutaneous tissue: skin warm, dry and intact without rashes  NEURO: no facial asymmetry, no speech deficits and able to follow directions, moves all extremities symmetrically  PSYCH:  insight, judgement, and memory impaired affect and mood normal    Labs:   CBC RESULTS:   Recent Labs   Lab Test 04/06/21 07/21/20   WBC 5.8 5.7   RBC 4.46 4.81   HGB 14.2 15.1   HCT 45.4 48.4*   * 101*   MCH 31.8 31.4   MCHC 31.3* 31.2*   RDW 14.1 13.5    201       Last Basic Metabolic Panel:  Recent Labs   Lab Test 04/06/21 07/21/20    141   POTASSIUM 3.8 3.6   CHLORIDE 101 101   RAEANN 9.2 9.7   CO2 31 30   BUN 14 16   CR 0.91 0.84    67*       Liver Function Studies -   Recent Labs   Lab Test 04/06/21 08/23/19  1527   PROTTOTAL 6.3 6.5*   ALBUMIN 3.5 3.6   BILITOTAL 0.7 0.4   ALKPHOS 69 69   AST 28 35   ALT 24 37       TSH   Date Value Ref Range Status   06/09/2021 2.66 0.30 - 5.00 uIU/mL Final   05/25/2021 13.60 (H) 0.30 - 5.00 uIU/mL Final   03/16/2021 0.97 0.30 - 5.00 uIU/mL Final   07/21/2020 1.82 0.30 - 5.00 uIU/mL Final     ASSESSMENT/PLAN:  (I42.9) Cardiomyopathy, unspecified type (H)  (I10) Hypertension  (R60.0) lower extremity edema  Comment: EF 46%, mild -mod TR and mild pulmonary HTN regular rhythm today. She had increased lower extremity edema therefore received lasix and improved.   Plan:   --hospice to put on TEDS.   --continue with metoprolol 25 mg daily.   --lisinopril 7.5 mg every evening    (F03.90) Dementia without behavioral disturbance, unspecified dementia type (H)  (primary encounter diagnosis)  Comment: Slums 18/30 upon admission to AL and now 5/30 in 9/2020. dementia has  now progressed and she is now on hospice. She continues to decline, eats small amounts, losing weight.   Plan:   -- risperidone 0.25 mg ODT daily and daily PRN  --PRN morphine and ativan  --continue with memory care assisted living for assistance with cares, meals and medications.      Electronically signed by:  MARTY Oviedo CNP                   Sincerely,        MARTY Oviedo CNP

## 2021-07-21 NOTE — PROGRESS NOTES
[place] [facility]  [place] Medical Record Number:  [id number]  Place of Service where encounter took place:  [facility] ([facility]) [[id number]]  Chief Complaint   Patient presents with     RECHECK     Routine       HPI:    Frances Alexander  is a 85 year old (1935), who is being seen today for an episodic care visit.  HPI information obtained from: facility chart records, facility staff, patient report and [facility] chart review.    Ms Alexander was visited today while in the dining room. She participates in the conversation, able to track and answer appropriately at times but others, speaks about other things. She smiles and appears comfortable. Staff does not have concerns at this time.     Past Medical and Surgical History reviewed in Epic today.    MEDICATIONS:    Current Outpatient Medications   Medication Sig Dispense Refill     acetaminophen (TYLENOL) 650 MG suppository Place 650 mg rectally daily as needed for fever       atropine 1 % SOLN Place 2 drops under the tongue every 2 hours as needed for secretions       bisacodyl (DULCOLAX) 10 MG suppository Place 10 mg rectally daily as needed for constipation       brimonidine (ALPHAGAN) 0.2 % ophthalmic solution Place 1 drop into both eyes 2 times daily 1 Bottle 3     dorzolamide-timolol PF (COSOPT) 22.3-6.8 MG/ML opthalmic solution 1 drop 2 times daily       furosemide (LASIX) 20 MG tablet Take 20 mg by mouth daily       haloperidol (HALDOL) 0.5 MG tablet Take 0.5 mg by mouth every 6 hours as needed for agitation       latanoprost (XALATAN) 0.005 % ophthalmic solution INSTILL 1 DROP INTO RIGHT EYE EVERY DAY AT BEDTIME *ALLOW FOR 5 MINUTES IN BETWEEN EYE DROPS* 2.5 mL 10     levothyroxine (SYNTHROID/LEVOTHROID) 125 MCG tablet TAKE ONE-HALF TABLET (62.5MCG) BY MOUTH ONCE DAILY 14 tablet 97     levothyroxine (SYNTHROID/LEVOTHROID) 75 MCG tablet 1 TABLET ORALLY DAILY (DX: HYPOTHYROIDISM) 28 tablet 10     lisinopril (ZESTRIL) 2.5 MG tablet  "TAKE 1 TABLET BY MOUTH EVERY EVENING 28 tablet 97     lisinopril (ZESTRIL) 5 MG tablet Take 5 mg by mouth every evening Give with 2.5 mg tablet for a total of 7.5 mg       lisinopril (ZESTRIL) 5 MG tablet TAKE 1 TABLET BY MOUTH EVERY MORNING 28 tablet 97     LORazepam (ATIVAN) 0.5 MG tablet Take 0.25 mg by mouth every 4 hours as needed for anxiety       LUBRICATING EYE DROPS 0.4-0.3 % SOLN ophthalmic solution PLACE DROP(S) INTO AFFECTED EYE(S) 2 TO 4 TIMES DAILY AS NEEDED FOR DRYNESS 15 mL 97     metoprolol succinate ER (TOPROL-XL) 25 MG 24 hr tablet Take 1 tablet (25 mg) by mouth daily 16 tablet 11     morphine 2.5 MG solu-tab Take 2.5 mg by mouth every 2 hours as needed for shortness of breath / dyspnea or moderate to severe pain       Nutritional Supplements (NUTRITIONAL SUPPLEMENT PO) Take by mouth daily       RHOPRESSA 0.02 % ophthalmic solution INSTILL 1 DROP INTO RIGHT EYE EVERY DAY AT BEDTIME *ALLOW FOR 5 MINUTES IN BETWEEN EYE DROPS* 2.5 mL 10     risperiDONE (RISPERDAL) 0.25 MG tablet Take 1 tablet (0.25 mg) by mouth daily And daily PRN       sennosides (SENOKOT) 8.6 MG tablet Take 1 tablet by mouth 2 times daily as needed for constipation       UNABLE TO FIND Place 1 drop into the right eye At Bedtime [facility]       vitamin D3 (CHOLECALCIFEROL) 1000 units (25 mcg) tablet Take 1 tablet (1,000 Units) by mouth daily 90 tablet 3       REVIEW OF SYSTEMS:  Unobtainable secondary to cognitive impairment.     Objective:  Temp 97.3  F (36.3  C)   Ht 1.575 m (5' 2\")   Wt 45.2 kg (99 lb 9.6 oz)   SpO2 98%   BMI 18.22 kg/m    Exam:  GENERAL APPEARANCE:  Alert, in no distress, pleasant, cooperative  EYES: no discharge or mattering on lids or lashes noted  ENT:  moist mucous membranes, hearing acuity intact  NECK: supple, symmetrical  RESP: no respiratory distress, Lung sounds clear, patient is on room air  CV:  rate and rhythm regular, no murmur. Edema 1+in bilateral lower extremities. VASCULAR: warm extremities " without open areas.  M/S:   Gait and station: ambulates independently without the use of an aid, no tenderness or swelling of the joints; able to move all extremities   SKIN:  Inspection and palpation of skin and subcutaneous tissue: skin warm, dry and intact without rashes  NEURO: no facial asymmetry, no speech deficits and able to follow directions, moves all extremities symmetrically  PSYCH:  insight, judgement, and memory impaired affect and mood normal    Labs:   CBC RESULTS:   Recent Labs   Lab Test 04/06/21 07/21/20   WBC 5.8 5.7   RBC 4.46 4.81   HGB 14.2 15.1   HCT 45.4 48.4*   * 101*   MCH 31.8 31.4   MCHC 31.3* 31.2*   RDW 14.1 13.5    201       Last Basic Metabolic Panel:  Recent Labs   Lab Test 04/06/21 07/21/20    141   POTASSIUM 3.8 3.6   CHLORIDE 101 101   RAEANN 9.2 9.7   CO2 31 30   BUN 14 16   CR 0.91 0.84    67*       Liver Function Studies -   Recent Labs   Lab Test 04/06/21 08/23/19  1527   PROTTOTAL 6.3 6.5*   ALBUMIN 3.5 3.6   BILITOTAL 0.7 0.4   ALKPHOS 69 69   AST 28 35   ALT 24 37       TSH   Date Value Ref Range Status   06/09/2021 2.66 0.30 - 5.00 uIU/mL Final   05/25/2021 13.60 (H) 0.30 - 5.00 uIU/mL Final   03/16/2021 0.97 0.30 - 5.00 uIU/mL Final   07/21/2020 1.82 0.30 - 5.00 uIU/mL Final     ASSESSMENT/PLAN:  (I42.9) Cardiomyopathy, unspecified type (H)  (I10) Hypertension  (R60.0) lower extremity edema  Comment: EF 46%, mild -mod TR and mild pulmonary HTN regular rhythm today. She had increased lower extremity edema therefore received lasix and improved.   Plan:   --hospice to put on TEDS.   --continue with metoprolol 25 mg daily.   --lisinopril 7.5 mg every evening    (F03.90) Dementia without behavioral disturbance, unspecified dementia type (H)  (primary encounter diagnosis)  Comment: Slums 18/30 upon admission to AL and now 5/30 in 9/2020. dementia has now progressed and she is now on hospice. She continues to decline, eats small amounts, losing weight.    Plan:   -- risperidone 0.25 mg ODT daily and daily PRN  --PRN morphine and ativan  --continue with memory care assisted living for assistance with cares, meals and medications.      Electronically signed by:  MARTY Oviedo CNP

## 2021-08-16 ENCOUNTER — TELEPHONE (OUTPATIENT)
Dept: LAB | Facility: CLINIC | Age: 86
End: 2021-08-16

## 2021-08-16 DIAGNOSIS — F02.80 LATE ONSET ALZHEIMER'S DISEASE WITHOUT BEHAVIORAL DISTURBANCE (H): Primary | ICD-10-CM

## 2021-08-16 DIAGNOSIS — G30.1 LATE ONSET ALZHEIMER'S DISEASE WITHOUT BEHAVIORAL DISTURBANCE (H): Primary | ICD-10-CM

## 2021-08-16 RX ORDER — RISPERIDONE 0.25 MG/1
TABLET, ORALLY DISINTEGRATING ORAL
Qty: 30 TABLET | Refills: 10 | Status: SHIPPED | OUTPATIENT
Start: 2021-08-16 | End: 2022-01-01

## 2021-08-16 RX ORDER — RISPERIDONE 0.25 MG/1
0.25 TABLET ORAL DAILY
Qty: 30 TABLET | Refills: 3 | Status: SHIPPED | OUTPATIENT
Start: 2021-08-16 | End: 2022-01-01

## 2021-08-16 NOTE — TELEPHONE ENCOUNTER
Pharmacy called to clarify- Both ODT and regular Risperdal were approved. Which do we want? Please cancel the refill on one.     Thank you

## 2021-10-10 ENCOUNTER — HEALTH MAINTENANCE LETTER (OUTPATIENT)
Age: 86
End: 2021-10-10

## 2021-11-02 ENCOUNTER — MEDICAL CORRESPONDENCE (OUTPATIENT)
Dept: HEALTH INFORMATION MANAGEMENT | Facility: CLINIC | Age: 86
End: 2021-11-02
Payer: COMMERCIAL

## 2021-11-08 ENCOUNTER — ASSISTED LIVING VISIT (OUTPATIENT)
Dept: GERIATRICS | Facility: CLINIC | Age: 86
End: 2021-11-08
Payer: COMMERCIAL

## 2021-11-08 VITALS
HEART RATE: 66 BPM | DIASTOLIC BLOOD PRESSURE: 68 MMHG | WEIGHT: 102.5 LBS | RESPIRATION RATE: 14 BRPM | BODY MASS INDEX: 18.75 KG/M2 | SYSTOLIC BLOOD PRESSURE: 118 MMHG | TEMPERATURE: 96 F

## 2021-11-08 DIAGNOSIS — E46 PROTEIN-CALORIE MALNUTRITION, UNSPECIFIED SEVERITY (H): ICD-10-CM

## 2021-11-08 DIAGNOSIS — R60.0 LOWER EXTREMITY EDEMA: ICD-10-CM

## 2021-11-08 DIAGNOSIS — F22 PARANOIA (H): ICD-10-CM

## 2021-11-08 DIAGNOSIS — I10 ESSENTIAL HYPERTENSION: ICD-10-CM

## 2021-11-08 DIAGNOSIS — F02.818 LATE ONSET ALZHEIMER'S DEMENTIA WITH BEHAVIORAL DISTURBANCE (H): Primary | ICD-10-CM

## 2021-11-08 DIAGNOSIS — Z51.5 HOSPICE CARE PATIENT: ICD-10-CM

## 2021-11-08 DIAGNOSIS — I42.9 CARDIOMYOPATHY, UNSPECIFIED TYPE (H): ICD-10-CM

## 2021-11-08 DIAGNOSIS — I47.10 SVT (SUPRAVENTRICULAR TACHYCARDIA) (H): ICD-10-CM

## 2021-11-08 DIAGNOSIS — G30.1 LATE ONSET ALZHEIMER'S DEMENTIA WITH BEHAVIORAL DISTURBANCE (H): Primary | ICD-10-CM

## 2021-11-08 RX ORDER — FUROSEMIDE 20 MG
20 TABLET ORAL DAILY
COMMUNITY
End: 2022-01-17

## 2021-11-08 NOTE — PROGRESS NOTES
[place] [facility]  [place] Medical Record Number:  [id number]  Place of Service where encounter took place:  [facility] ([facility]) [[id number]]  Chief Complaint   Patient presents with     RECHECK       HPI:    Frances Alexander  is a 86 year old (1935), who is being seen today for an episodic care visit.  HPI information obtained from: facility chart records, facility staff, patient report and [facility] chart review.   She has lived in Memory Care at this facility since 10/2019. Medical history significant for dementia, HTN, cardiomyopathy, SVT, remote breast cancer, hypothyroidism. She is receiving care with [facility].     Today's concern is:     Late onset Alzheimer's dementia with behavioral disturbance (H)  Paranoia (H)  Protein-calorie malnutrition, unspecified severity (H)  Cardiomyopathy, unspecified type (H)  Lower extremity edema  SVT (supraventricular tachycardia) (H)  Essential hypertension  Hospice care patient   She is a poor historian. Up and about in her apt without a device. Talkative, but nonsensical. She comments that her pants are too big and they are actually much too big. Weight has ranged  over the past 5 months, most recent weight 102 lbs. Appetite was fair at breakfast today. She has been very attentive to a male resident on the unit, which has caused some distress. She can be paranoid re: medication admin. Staff reports no new concerns.     Past Medical and Surgical History reviewed in Epic today.    MEDICATIONS:    Current Outpatient Medications   Medication Sig Dispense Refill     acetaminophen (TYLENOL) 650 MG suppository Place 650 mg rectally daily as needed for fever       atropine 1 % SOLN Place 2 drops under the tongue every 2 hours as needed for secretions       bisacodyl (DULCOLAX) 10 MG suppository Place 10 mg rectally daily as needed for constipation       brimonidine (ALPHAGAN) 0.2 % ophthalmic solution Place 1 drop  into both eyes 2 times daily (Patient taking differently: Place 1 drop into the right eye 2 times daily ) 1 Bottle 3     dorzolamide-timolol PF (COSOPT) 22.3-6.8 MG/ML opthalmic solution Place 1 drop into the right eye 2 times daily        furosemide (LASIX) 20 MG tablet Take 30 mg by mouth daily       haloperidol (HALDOL) 0.5 MG tablet Take 0.5 mg by mouth every 6 hours as needed for agitation       latanoprost (XALATAN) 0.005 % ophthalmic solution INSTILL 1 DROP INTO RIGHT EYE EVERY DAY AT BEDTIME *ALLOW FOR 5 MINUTES IN BETWEEN EYE DROPS* 2.5 mL 10     levothyroxine (SYNTHROID/LEVOTHROID) 75 MCG tablet 1 TABLET ORALLY DAILY (DX: HYPOTHYROIDISM) 28 tablet 10     LORazepam (ATIVAN) 0.5 MG tablet Take 0.25 mg by mouth every 4 hours as needed for anxiety       LUBRICATING EYE DROPS 0.4-0.3 % SOLN ophthalmic solution PLACE DROP(S) INTO AFFECTED EYE(S) 2 TO 4 TIMES DAILY AS NEEDED FOR DRYNESS (Patient taking differently: Place 1 drop into both eyes 2 times daily as needed ) 15 mL 97     metoprolol succinate ER (TOPROL-XL) 25 MG 24 hr tablet Take 1 tablet (25 mg) by mouth daily 16 tablet 11     morphine 2.5 MG solu-tab Take 2.5 mg by mouth every 2 hours as needed for shortness of breath / dyspnea or moderate to severe pain       RHOPRESSA 0.02 % ophthalmic solution INSTILL 1 DROP INTO RIGHT EYE EVERY DAY AT BEDTIME *ALLOW FOR 5 MINUTES IN BETWEEN EYE DROPS* 2.5 mL 10     risperiDONE (RISPERDAL M-TABS) 0.25 MG ODT tab PLACE 1 TABLET UNDER THE TONGUE EVERY EVENING;PLACE 1 TABLET UNDER THE TONGUE DAILY AS NEEDED (DX: DELUSIONS/HALLUCINATIONS) (Patient taking differently: 0.5 mg At Bedtime ) 30 tablet 10     risperiDONE (RISPERDAL) 0.25 MG tablet Take 1 tablet (0.25 mg) by mouth daily And daily PRN (Patient taking differently: Take 0.25 mg by mouth daily as needed ) 30 tablet 3     sennosides (SENOKOT) 8.6 MG tablet Take 1 tablet by mouth 2 times daily as needed for constipation           REVIEW OF SYSTEMS:  Unobtainable  secondary to cognitive impairment.     Objective:  /68   Pulse 66   Temp (!) 96  F (35.6  C)   Resp 14   Wt 46.5 kg (102 lb 8 oz)   BMI 18.75 kg/m    Exam:  GENERAL APPEARANCE:  Alert, in no distress, thin  ENT:  [place], oropharynx clear  EYES:  EOM normal, conjunctiva and lids normal  NECK: no adenopathy,masses or thyromegaly  RESP:  lungs clear to auscultation , no respiratory distress  CV:  regular rate and rhythm, no murmur, +2 pedal pulses, peripheral edema trace+ in both LE  ABDOMEN:  soft, non-tender, no distension, no masses  M/S:   gait steady without a  device. VALENTINO with good strength. No joint inflammation  SKIN:  no rashes or open areas  PSYCH:  oriented to self, insight and judgement impaired, memory impaired , affect and mood normal    Labs:   Recent labs in [facility] reviewed by me today.     ASSESSMENT / PLAN:  (G30.1,  F02.81) Late onset Alzheimer's dementia with behavioral disturbance (H)  (primary encounter diagnosis)  (F22) Paranoia (H)  Comment: advanced disease with severe cognitive deficits. SLUMS 5/30. Paranoia and mild behavior issues appear controlled   Plan: continue risperidone, ativan prn, haldol prn. Memory Care staff to assist with all cares, meals and med admin.     (E46) Protein-calorie malnutrition, unspecified severity (H)  Comment: oral intake fair to poor  Weight stable at 102 lbs   Plan: encourage nutritional supplements, supervision at meals.     (I42.9) Cardiomyopathy, unspecified type (H)  (R60.0) Lower extremity edema  Comment: LE edema appears improved based on previous notes   EF 46% in 9/2019   Plan: continue lasix, metoprolol. Monitor symptoms     (I47.1) SVT (supraventricular tachycardia) (H)  (I10) Essential hypertension  Comment: controlled with HR: . BPs: 118/68, 120/83, 123/87  Plan: continue metoprolol     (Z51.5) Hospice care patient  Comment: comfort meds are in place   Plan: continue care with [facility]       Electronically signed by:  Anders Glover  MARTY Mckee CNP

## 2021-11-08 NOTE — LETTER
11/8/2021        RE: Frances Alexander  [facility]  [address]  [facility] [address]        [place] [facility]  [place] Medical Record Number:  [id number]  Place of Service where encounter took place:  [facility] ([facility] [[id number]]  Chief Complaint   Patient presents with     RECHECK       HPI:    Frances Alexander  is a 86 year old (1935), who is being seen today for an episodic care visit.  HPI information obtained from: facility chart records, facility staff, patient report and [facility] chart review.   She has lived in Memory Care at this facility since 10/2019. Medical history significant for dementia, HTN, cardiomyopathy, SVT, remote breast cancer, hypothyroidism. She is receiving care with [facility].     Today's concern is:     Late onset Alzheimer's dementia with behavioral disturbance (H)  Paranoia (H)  Protein-calorie malnutrition, unspecified severity (H)  Cardiomyopathy, unspecified type (H)  Lower extremity edema  SVT (supraventricular tachycardia) (H)  Essential hypertension  Hospice care patient   She is a poor historian. Up and about in her apt without a device. Talkative, but nonsensical. She comments that her pants are too big and they are actually much too big. Weight has ranged  over the past 5 months, most recent weight 102 lbs. Appetite was fair at breakfast today. She has been very attentive to a male resident on the unit, which has caused some distress. She can be paranoid re: medication admin. Staff reports no new concerns.     Past Medical and Surgical History reviewed in Epic today.    MEDICATIONS:    Current Outpatient Medications   Medication Sig Dispense Refill     acetaminophen (TYLENOL) 650 MG suppository Place 650 mg rectally daily as needed for fever       atropine 1 % SOLN Place 2 drops under the tongue every 2 hours as needed for secretions       bisacodyl (DULCOLAX) 10 MG suppository Place 10 mg rectally  daily as needed for constipation       brimonidine (ALPHAGAN) 0.2 % ophthalmic solution Place 1 drop into both eyes 2 times daily (Patient taking differently: Place 1 drop into the right eye 2 times daily ) 1 Bottle 3     dorzolamide-timolol PF (COSOPT) 22.3-6.8 MG/ML opthalmic solution Place 1 drop into the right eye 2 times daily        furosemide (LASIX) 20 MG tablet Take 30 mg by mouth daily       haloperidol (HALDOL) 0.5 MG tablet Take 0.5 mg by mouth every 6 hours as needed for agitation       latanoprost (XALATAN) 0.005 % ophthalmic solution INSTILL 1 DROP INTO RIGHT EYE EVERY DAY AT BEDTIME *ALLOW FOR 5 MINUTES IN BETWEEN EYE DROPS* 2.5 mL 10     levothyroxine (SYNTHROID/LEVOTHROID) 75 MCG tablet 1 TABLET ORALLY DAILY (DX: HYPOTHYROIDISM) 28 tablet 10     LORazepam (ATIVAN) 0.5 MG tablet Take 0.25 mg by mouth every 4 hours as needed for anxiety       LUBRICATING EYE DROPS 0.4-0.3 % SOLN ophthalmic solution PLACE DROP(S) INTO AFFECTED EYE(S) 2 TO 4 TIMES DAILY AS NEEDED FOR DRYNESS (Patient taking differently: Place 1 drop into both eyes 2 times daily as needed ) 15 mL 97     metoprolol succinate ER (TOPROL-XL) 25 MG 24 hr tablet Take 1 tablet (25 mg) by mouth daily 16 tablet 11     morphine 2.5 MG solu-tab Take 2.5 mg by mouth every 2 hours as needed for shortness of breath / dyspnea or moderate to severe pain       RHOPRESSA 0.02 % ophthalmic solution INSTILL 1 DROP INTO RIGHT EYE EVERY DAY AT BEDTIME *ALLOW FOR 5 MINUTES IN BETWEEN EYE DROPS* 2.5 mL 10     risperiDONE (RISPERDAL M-TABS) 0.25 MG ODT tab PLACE 1 TABLET UNDER THE TONGUE EVERY EVENING;PLACE 1 TABLET UNDER THE TONGUE DAILY AS NEEDED (DX: DELUSIONS/HALLUCINATIONS) (Patient taking differently: 0.5 mg At Bedtime ) 30 tablet 10     risperiDONE (RISPERDAL) 0.25 MG tablet Take 1 tablet (0.25 mg) by mouth daily And daily PRN (Patient taking differently: Take 0.25 mg by mouth daily as needed ) 30 tablet 3     sennosides (SENOKOT) 8.6 MG tablet Take 1  tablet by mouth 2 times daily as needed for constipation           REVIEW OF SYSTEMS:  Unobtainable secondary to cognitive impairment.     Objective:  /68   Pulse 66   Temp (!) 96  F (35.6  C)   Resp 14   Wt 46.5 kg (102 lb 8 oz)   BMI 18.75 kg/m    Exam:  GENERAL APPEARANCE:  Alert, in no distress, thin  ENT:  [place], oropharynx clear  EYES:  EOM normal, conjunctiva and lids normal  NECK: no adenopathy,masses or thyromegaly  RESP:  lungs clear to auscultation , no respiratory distress  CV:  regular rate and rhythm, no murmur, +2 pedal pulses, peripheral edema trace+ in both LE  ABDOMEN:  soft, non-tender, no distension, no masses  M/S:   gait steady without a  device. VALENTINO with good strength. No joint inflammation  SKIN:  no rashes or open areas  PSYCH:  oriented to self, insight and judgement impaired, memory impaired , affect and mood normal    Labs:   Recent labs in [facility] reviewed by me today.     ASSESSMENT / PLAN:  (G30.1,  F02.81) Late onset Alzheimer's dementia with behavioral disturbance (H)  (primary encounter diagnosis)  (F22) Paranoia (H)  Comment: advanced disease with severe cognitive deficits. SLUMS 5/30. Paranoia and mild behavior issues appear controlled   Plan: continue risperidone, ativan prn, haldol prn. Memory Care staff to assist with all cares, meals and med admin.     (E46) Protein-calorie malnutrition, unspecified severity (H)  Comment: oral intake fair to poor  Weight stable at 102 lbs   Plan: encourage nutritional supplements, supervision at meals.     (I42.9) Cardiomyopathy, unspecified type (H)  (R60.0) Lower extremity edema  Comment: LE edema appears improved based on previous notes   EF 46% in 9/2019   Plan: continue lasix, metoprolol. Monitor symptoms     (I47.1) SVT (supraventricular tachycardia) (H)  (I10) Essential hypertension  Comment: controlled with HR: . BPs: 118/68, 120/83, 123/87  Plan: continue metoprolol     (Z51.5) Hospice care patient  Comment: comfort meds  are in place   Plan: continue care with [facility]       Electronically signed by:  MARTY Uribe CNP                 Sincerely,        MARTY Uribe CNP

## 2021-11-18 ENCOUNTER — LAB REQUISITION (OUTPATIENT)
Dept: LAB | Facility: CLINIC | Age: 86
End: 2021-11-18
Payer: MEDICARE

## 2021-11-18 DIAGNOSIS — U07.1 COVID-19: ICD-10-CM

## 2021-11-18 PROCEDURE — U0003 INFECTIOUS AGENT DETECTION BY NUCLEIC ACID (DNA OR RNA); SEVERE ACUTE RESPIRATORY SYNDROME CORONAVIRUS 2 (SARS-COV-2) (CORONAVIRUS DISEASE [COVID-19]), AMPLIFIED PROBE TECHNIQUE, MAKING USE OF HIGH THROUGHPUT TECHNOLOGIES AS DESCRIBED BY CMS-2020-01-R: HCPCS | Mod: ORL | Performed by: NURSE PRACTITIONER

## 2021-11-19 ENCOUNTER — TELEPHONE (OUTPATIENT)
Dept: GERIATRICS | Facility: CLINIC | Age: 86
End: 2021-11-19
Payer: MEDICARE

## 2021-11-19 LAB
SARS-COV-2 RNA RESP QL NAA+PROBE: NORMAL
SARS-COV-2 RNA RESP QL NAA+PROBE: NOT DETECTED

## 2021-11-19 NOTE — TELEPHONE ENCOUNTER
[facility] Nurse Triage Telephone Note    Provider: MARTY Medina CNP   Facility: [facility] Facility Type:  AL    Caller: Scarlett   Call Back Number: [phone]    Allergies:    Allergies   Allergen Reactions     No Known Allergies         Reason for call: see below for pt call, nursing reports no pain, no itching, no redness on the eye, Pt is on hospice and hospice has been updated.           Verbal Order/Direction given by Provider: Continue to monitor and call with worsening or any changes.     Provider giving Order:  MARTY Mccray CNP      Verbal Order given to: Scarlett Ortega RN

## 2022-01-01 ENCOUNTER — ASSISTED LIVING VISIT (OUTPATIENT)
Dept: GERIATRICS | Facility: CLINIC | Age: 87
End: 2022-01-01
Payer: COMMERCIAL

## 2022-01-01 ENCOUNTER — LAB REQUISITION (OUTPATIENT)
Dept: LAB | Facility: CLINIC | Age: 87
End: 2022-01-01
Payer: MEDICARE

## 2022-01-01 ENCOUNTER — APPOINTMENT (OUTPATIENT)
Dept: CT IMAGING | Facility: CLINIC | Age: 87
End: 2022-01-01
Attending: EMERGENCY MEDICINE
Payer: COMMERCIAL

## 2022-01-01 ENCOUNTER — HEALTH MAINTENANCE LETTER (OUTPATIENT)
Age: 87
End: 2022-01-01

## 2022-01-01 ENCOUNTER — LAB REQUISITION (OUTPATIENT)
Dept: LAB | Facility: CLINIC | Age: 87
End: 2022-01-01
Payer: COMMERCIAL

## 2022-01-01 ENCOUNTER — HOSPITAL ENCOUNTER (EMERGENCY)
Facility: CLINIC | Age: 87
Discharge: HOME | End: 2022-12-12
Attending: EMERGENCY MEDICINE | Admitting: EMERGENCY MEDICINE
Payer: COMMERCIAL

## 2022-01-01 ENCOUNTER — ASSISTED LIVING VISIT (OUTPATIENT)
Dept: GERIATRICS | Facility: CLINIC | Age: 87
End: 2022-01-01
Payer: OTHER MISCELLANEOUS

## 2022-01-01 ENCOUNTER — DOCUMENTATION ONLY (OUTPATIENT)
Dept: OTHER | Facility: CLINIC | Age: 87
End: 2022-01-01

## 2022-01-01 VITALS
DIASTOLIC BLOOD PRESSURE: 84 MMHG | BODY MASS INDEX: 19.39 KG/M2 | TEMPERATURE: 97.4 F | WEIGHT: 106 LBS | RESPIRATION RATE: 18 BRPM | HEART RATE: 78 BPM | SYSTOLIC BLOOD PRESSURE: 138 MMHG

## 2022-01-01 VITALS
TEMPERATURE: 97.5 F | HEIGHT: 62 IN | RESPIRATION RATE: 18 BRPM | DIASTOLIC BLOOD PRESSURE: 60 MMHG | OXYGEN SATURATION: 95 % | HEART RATE: 60 BPM | SYSTOLIC BLOOD PRESSURE: 135 MMHG | BODY MASS INDEX: 22.31 KG/M2

## 2022-01-01 VITALS
TEMPERATURE: 97.5 F | DIASTOLIC BLOOD PRESSURE: 80 MMHG | RESPIRATION RATE: 28 BRPM | HEART RATE: 117 BPM | WEIGHT: 107 LBS | BODY MASS INDEX: 19.57 KG/M2 | SYSTOLIC BLOOD PRESSURE: 110 MMHG

## 2022-01-01 VITALS
TEMPERATURE: 97.8 F | RESPIRATION RATE: 20 BRPM | BODY MASS INDEX: 22.31 KG/M2 | DIASTOLIC BLOOD PRESSURE: 76 MMHG | SYSTOLIC BLOOD PRESSURE: 115 MMHG | WEIGHT: 122 LBS | HEART RATE: 63 BPM

## 2022-01-01 VITALS
DIASTOLIC BLOOD PRESSURE: 100 MMHG | RESPIRATION RATE: 16 BRPM | SYSTOLIC BLOOD PRESSURE: 148 MMHG | OXYGEN SATURATION: 94 % | HEART RATE: 71 BPM | TEMPERATURE: 98.5 F

## 2022-01-01 VITALS
HEART RATE: 71 BPM | SYSTOLIC BLOOD PRESSURE: 148 MMHG | TEMPERATURE: 98.5 F | BODY MASS INDEX: 22.68 KG/M2 | RESPIRATION RATE: 16 BRPM | DIASTOLIC BLOOD PRESSURE: 100 MMHG | WEIGHT: 124 LBS

## 2022-01-01 VITALS
HEART RATE: 90 BPM | SYSTOLIC BLOOD PRESSURE: 145 MMHG | RESPIRATION RATE: 20 BRPM | TEMPERATURE: 97.7 F | DIASTOLIC BLOOD PRESSURE: 67 MMHG

## 2022-01-01 DIAGNOSIS — F02.818 LATE ONSET ALZHEIMER'S DEMENTIA WITH BEHAVIORAL DISTURBANCE (H): ICD-10-CM

## 2022-01-01 DIAGNOSIS — I42.9 CARDIOMYOPATHY, UNSPECIFIED TYPE (H): ICD-10-CM

## 2022-01-01 DIAGNOSIS — R60.0 LOWER EXTREMITY EDEMA: ICD-10-CM

## 2022-01-01 DIAGNOSIS — U07.1 COVID-19: ICD-10-CM

## 2022-01-01 DIAGNOSIS — G30.1 LATE ONSET ALZHEIMER'S DEMENTIA WITH BEHAVIORAL DISTURBANCE (H): ICD-10-CM

## 2022-01-01 DIAGNOSIS — I10 ESSENTIAL HYPERTENSION: ICD-10-CM

## 2022-01-01 DIAGNOSIS — F03.90 DEMENTIA WITHOUT BEHAVIORAL DISTURBANCE, PSYCHOTIC DISTURBANCE, MOOD DISTURBANCE, OR ANXIETY, UNSPECIFIED DEMENTIA SEVERITY, UNSPECIFIED DEMENTIA TYPE (H): ICD-10-CM

## 2022-01-01 DIAGNOSIS — I47.10 SVT (SUPRAVENTRICULAR TACHYCARDIA) (H): ICD-10-CM

## 2022-01-01 DIAGNOSIS — E46 PROTEIN-CALORIE MALNUTRITION, UNSPECIFIED SEVERITY (H): ICD-10-CM

## 2022-01-01 DIAGNOSIS — R52 SEVERE PAIN: ICD-10-CM

## 2022-01-01 DIAGNOSIS — F22 PARANOIA (H): ICD-10-CM

## 2022-01-01 DIAGNOSIS — G30.1 LATE ONSET ALZHEIMER'S DEMENTIA WITH BEHAVIORAL DISTURBANCE (H): Primary | ICD-10-CM

## 2022-01-01 DIAGNOSIS — E03.9 HYPOTHYROIDISM, UNSPECIFIED TYPE: ICD-10-CM

## 2022-01-01 DIAGNOSIS — I10 ESSENTIAL (PRIMARY) HYPERTENSION: ICD-10-CM

## 2022-01-01 DIAGNOSIS — S00.31XA ABRASION OF NOSE, INITIAL ENCOUNTER: ICD-10-CM

## 2022-01-01 DIAGNOSIS — F02.818 LATE ONSET ALZHEIMER'S DEMENTIA WITH BEHAVIORAL DISTURBANCE (H): Primary | ICD-10-CM

## 2022-01-01 DIAGNOSIS — R52 PAIN: Primary | ICD-10-CM

## 2022-01-01 DIAGNOSIS — S02.2XXD CLOSED FRACTURE OF NASAL BONE WITH ROUTINE HEALING, SUBSEQUENT ENCOUNTER: Primary | ICD-10-CM

## 2022-01-01 DIAGNOSIS — R52 SEVERE PAIN: Primary | ICD-10-CM

## 2022-01-01 DIAGNOSIS — S05.42XA: ICD-10-CM

## 2022-01-01 DIAGNOSIS — U07.1 INFECTION DUE TO 2019 NOVEL CORONAVIRUS: Primary | ICD-10-CM

## 2022-01-01 DIAGNOSIS — E20.9 HYPOPARATHYROIDISM, UNSPECIFIED (H): ICD-10-CM

## 2022-01-01 DIAGNOSIS — S02.2XXA CLOSED FRACTURE OF NASAL BONE, INITIAL ENCOUNTER: ICD-10-CM

## 2022-01-01 DIAGNOSIS — G30.1 LATE ONSET ALZHEIMER'S DISEASE WITHOUT BEHAVIORAL DISTURBANCE (H): ICD-10-CM

## 2022-01-01 DIAGNOSIS — Z51.5 HOSPICE CARE PATIENT: ICD-10-CM

## 2022-01-01 DIAGNOSIS — N18.31 CHRONIC KIDNEY DISEASE, STAGE 3A (H): ICD-10-CM

## 2022-01-01 DIAGNOSIS — R53.1 WEAKNESS: ICD-10-CM

## 2022-01-01 DIAGNOSIS — F02.80 LATE ONSET ALZHEIMER'S DISEASE WITHOUT BEHAVIORAL DISTURBANCE (H): ICD-10-CM
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
## 2022-01-01 LAB
ANION GAP SERPL CALCULATED.3IONS-SCNC: 19 MMOL/L (ref 7–15)
BUN SERPL-MCNC: 22.2 MG/DL (ref 8–23)
CALCIUM SERPL-MCNC: 9.7 MG/DL (ref 8.8–10.2)
CHLORIDE SERPL-SCNC: 104 MMOL/L (ref 98–107)
CREAT SERPL-MCNC: 1 MG/DL (ref 0.51–0.95)
DEPRECATED HCO3 PLAS-SCNC: 20 MMOL/L (ref 22–29)
ERYTHROCYTE [DISTWIDTH] IN BLOOD BY AUTOMATED COUNT: 13.4 % (ref 10–15)
GFR SERPL CREATININE-BSD FRML MDRD: 54 ML/MIN/1.73M2
GLUCOSE SERPL-MCNC: 114 MG/DL (ref 70–99)
HCT VFR BLD AUTO: 43.7 % (ref 35–47)
HGB BLD-MCNC: 13.9 G/DL (ref 11.7–15.7)
MCH RBC QN AUTO: 31.3 PG (ref 26.5–33)
MCHC RBC AUTO-ENTMCNC: 31.8 G/DL (ref 31.5–36.5)
MCV RBC AUTO: 98 FL (ref 78–100)
PLATELET # BLD AUTO: 222 10E3/UL (ref 150–450)
POTASSIUM SERPL-SCNC: 4 MMOL/L (ref 3.4–5.3)
RBC # BLD AUTO: 4.44 10E6/UL (ref 3.8–5.2)
SARS-COV-2 RNA RESP QL NAA+PROBE: NEGATIVE
SARS-COV-2 RNA RESP QL NAA+PROBE: NEGATIVE
SARS-COV-2 RNA RESP QL NAA+PROBE: POSITIVE
SODIUM SERPL-SCNC: 143 MMOL/L (ref 136–145)
TSH SERPL DL<=0.005 MIU/L-ACNC: 2.7 UIU/ML (ref 0.3–4.2)
WBC # BLD AUTO: 6.6 10E3/UL (ref 4–11)

## 2022-01-01 PROCEDURE — 85027 COMPLETE CBC AUTOMATED: CPT | Mod: ORL | Performed by: NURSE PRACTITIONER

## 2022-01-01 PROCEDURE — U0003 INFECTIOUS AGENT DETECTION BY NUCLEIC ACID (DNA OR RNA); SEVERE ACUTE RESPIRATORY SYNDROME CORONAVIRUS 2 (SARS-COV-2) (CORONAVIRUS DISEASE [COVID-19]), AMPLIFIED PROBE TECHNIQUE, MAKING USE OF HIGH THROUGHPUT TECHNOLOGIES AS DESCRIBED BY CMS-2020-01-R: HCPCS | Mod: ORL | Performed by: NURSE PRACTITIONER

## 2022-01-01 PROCEDURE — 72125 CT NECK SPINE W/O DYE: CPT

## 2022-01-01 PROCEDURE — 80048 BASIC METABOLIC PNL TOTAL CA: CPT | Mod: ORL | Performed by: NURSE PRACTITIONER

## 2022-01-01 PROCEDURE — P9603 ONE-WAY ALLOW PRORATED MILES: HCPCS | Mod: ORL | Performed by: NURSE PRACTITIONER

## 2022-01-01 PROCEDURE — 99284 EMERGENCY DEPT VISIT MOD MDM: CPT | Mod: 25

## 2022-01-01 PROCEDURE — 84443 ASSAY THYROID STIM HORMONE: CPT | Mod: ORL | Performed by: NURSE PRACTITIONER

## 2022-01-01 PROCEDURE — 70450 CT HEAD/BRAIN W/O DYE: CPT

## 2022-01-01 PROCEDURE — 36415 COLL VENOUS BLD VENIPUNCTURE: CPT | Mod: ORL | Performed by: NURSE PRACTITIONER

## 2022-01-01 RX ORDER — MORPHINE SULFATE 30 MG/1
2.5 TABLET ORAL
Qty: 30 TABLET | Refills: 0 | Status: SHIPPED | OUTPATIENT
Start: 2022-01-01 | End: 2022-01-01

## 2022-01-01 RX ORDER — LEVOTHYROXINE SODIUM 75 UG/1
TABLET ORAL
Qty: 31 TABLET | Refills: 11 | Status: SHIPPED | OUTPATIENT
Start: 2022-01-01

## 2022-01-01 RX ORDER — RISPERIDONE 0.25 MG/1
TABLET, ORALLY DISINTEGRATING ORAL
Qty: 30 TABLET | Refills: 11 | Status: SHIPPED | OUTPATIENT
Start: 2022-01-01

## 2022-01-01 RX ORDER — RISPERIDONE 0.25 MG/1
TABLET, ORALLY DISINTEGRATING ORAL
Qty: 30 TABLET | Refills: 10 | Status: SHIPPED | OUTPATIENT
Start: 2022-01-01 | End: 2022-01-01

## 2022-01-01 RX ORDER — MORPHINE SULFATE 30 MG/1
TABLET ORAL
Qty: 30 TABLET | Refills: 0 | Status: SHIPPED | OUTPATIENT
Start: 2022-01-01 | End: 2022-01-01

## 2022-01-01 RX ORDER — FUROSEMIDE 20 MG
TABLET ORAL
Qty: 31 TABLET | Refills: 11 | Status: SHIPPED | OUTPATIENT
Start: 2022-01-01

## 2022-01-01 ASSESSMENT — ACTIVITIES OF DAILY LIVING (ADL): ADLS_ACUITY_SCORE: 35

## 2022-01-03 ENCOUNTER — ASSISTED LIVING VISIT (OUTPATIENT)
Dept: GERIATRICS | Facility: CLINIC | Age: 87
End: 2022-01-03
Payer: COMMERCIAL

## 2022-01-03 VITALS
SYSTOLIC BLOOD PRESSURE: 95 MMHG | WEIGHT: 100 LBS | HEART RATE: 85 BPM | BODY MASS INDEX: 18.29 KG/M2 | RESPIRATION RATE: 20 BRPM | TEMPERATURE: 97.5 F | DIASTOLIC BLOOD PRESSURE: 67 MMHG

## 2022-01-03 DIAGNOSIS — F02.818 LATE ONSET ALZHEIMER'S DEMENTIA WITH BEHAVIORAL DISTURBANCE (H): Primary | ICD-10-CM

## 2022-01-03 DIAGNOSIS — E46 PROTEIN-CALORIE MALNUTRITION, UNSPECIFIED SEVERITY (H): ICD-10-CM

## 2022-01-03 DIAGNOSIS — G30.1 LATE ONSET ALZHEIMER'S DEMENTIA WITH BEHAVIORAL DISTURBANCE (H): Primary | ICD-10-CM

## 2022-01-03 DIAGNOSIS — F22 PARANOIA (H): ICD-10-CM

## 2022-01-03 DIAGNOSIS — I10 ESSENTIAL HYPERTENSION: ICD-10-CM

## 2022-01-03 DIAGNOSIS — I47.10 SVT (SUPRAVENTRICULAR TACHYCARDIA) (H): ICD-10-CM

## 2022-01-03 DIAGNOSIS — I42.9 CARDIOMYOPATHY, UNSPECIFIED TYPE (H): ICD-10-CM

## 2022-01-03 DIAGNOSIS — R60.0 LOWER EXTREMITY EDEMA: ICD-10-CM

## 2022-01-03 DIAGNOSIS — Z51.5 HOSPICE CARE PATIENT: ICD-10-CM

## 2022-01-03 NOTE — PROGRESS NOTES
[place] [facility]  [place] Medical Record Number:  [id number]  Place [facility] where encounter took place[facility] [[id number][facility]  Chief Complaint   Patient presents with     RECHECK       HPI:    Frances Alexander  is a 86 year old (1935), who is being seen today for an episodic care visit.  HPI information obtained from: facility chart records, facility staff, patient report, [facility] chart review and family/first contact son  report.She is receiving care with [facility].        Today's concern is:     Late onset Alzheimer's dementia with behavioral disturbance (H)  Paranoia (H)  Protein-calorie malnutrition, unspecified severity (H)  Cardiomyopathy, unspecified type (H)  SVT (supraventricular tachycardia) (H)  Lower extremity edema  Essential hypertension  Hospice care patient   She is a poor historian. Smiles and interactive. Conversation  nonsensical. Ambulates without a device. No falls reported. Can be paranoid,  but no significant behavior issues. Requires supervision to min assist with cares. Nurse reports an occasional dry cough. No other symptoms, no hypoxia and afebrile.     Past Medical and Surgical History reviewed in Epic today.    MEDICATIONS:    Current Outpatient Medications   Medication Sig Dispense Refill     acetaminophen (TYLENOL) 650 MG suppository Place 650 mg rectally daily as needed for fever       atropine 1 % SOLN Place 2 drops under the tongue every 2 hours as needed for secretions       bisacodyl (DULCOLAX) 10 MG suppository Place 10 mg rectally daily as needed for constipation       brimonidine (ALPHAGAN) 0.2 % ophthalmic solution Place 1 drop into both eyes 2 times daily (Patient taking differently: Place 1 drop into the right eye 2 times daily ) 1 Bottle 3     dorzolamide-timolol PF (COSOPT) 22.3-6.8 MG/ML opthalmic solution Place 1 drop into the right eye 2 times daily        furosemide (LASIX) 20 MG tablet Take 30  mg by mouth daily       haloperidol (HALDOL) 0.5 MG tablet Take 0.5 mg by mouth every 6 hours as needed for agitation       latanoprost (XALATAN) 0.005 % ophthalmic solution INSTILL 1 DROP INTO RIGHT EYE EVERY DAY AT BEDTIME *ALLOW FOR 5 MINUTES IN BETWEEN EYE DROPS* 2.5 mL 10     levothyroxine (SYNTHROID/LEVOTHROID) 75 MCG tablet 1 TABLET ORALLY DAILY (DX: HYPOTHYROIDISM) 28 tablet 10     LORazepam (ATIVAN) 0.5 MG tablet Take 0.25 mg by mouth every 4 hours as needed for anxiety       LUBRICATING EYE DROPS 0.4-0.3 % SOLN ophthalmic solution PLACE DROP(S) INTO AFFECTED EYE(S) 2 TO 4 TIMES DAILY AS NEEDED FOR DRYNESS (Patient taking differently: Place 1 drop into both eyes 2 times daily as needed ) 15 mL 97     metoprolol succinate ER (TOPROL-XL) 25 MG 24 hr tablet Take 1 tablet (25 mg) by mouth daily 16 tablet 11     morphine 2.5 MG solu-tab Take 2.5 mg by mouth every 2 hours as needed for shortness of breath / dyspnea or moderate to severe pain       RHOPRESSA 0.02 % ophthalmic solution INSTILL 1 DROP INTO RIGHT EYE EVERY DAY AT BEDTIME *ALLOW FOR 5 MINUTES IN BETWEEN EYE DROPS* 2.5 mL 10     risperiDONE (RISPERDAL M-TABS) 0.25 MG ODT tab PLACE 1 TABLET UNDER THE TONGUE EVERY EVENING;PLACE 1 TABLET UNDER THE TONGUE DAILY AS NEEDED (DX: DELUSIONS/HALLUCINATIONS) (Patient taking differently: 0.5 mg At Bedtime ) 30 tablet 10     risperiDONE (RISPERDAL) 0.25 MG tablet Take 1 tablet (0.25 mg) by mouth daily And daily PRN (Patient taking differently: Take 0.25 mg by mouth daily as needed ) 30 tablet 3     sennosides (SENOKOT) 8.6 MG tablet Take 1 tablet by mouth 2 times daily as needed for constipation           REVIEW OF SYSTEMS:  Unobtainable secondary to cognitive impairment.     Objective:  BP 95/67   Pulse 85   Temp 97.5  F (36.4  C)   Resp 20   Wt 45.4 kg (100 lb)   BMI 18.29 kg/m    Exam:  GENERAL APPEARANCE:  Alert, in no distress, thin  ENT:  [place], oropharynx clear  EYES:  EOM normal, conjunctiva and lids  normal  NECK: no adenopathy,masses or thyromegaly  RESP:  lungs clear to auscultation , no respiratory distress  CV:  regular rate and rhythm, no murmur, +2 pedal pulses, peripheral edema trace+ in both LE  ABDOMEN:  soft, non-tender, no distension, no masses  M/S:  up in chair.  VALENTINO with good strength. No joint inflammation  SKIN:  no rashes or open areas  PSYCH:  oriented to self, insight and judgement impaired, memory impaired , affect and mood normal    Labs:   Recent labs in [facility] reviewed by me today.     ASSESSMENT / PLAN:  (G30.1,  F02.81) Late onset Alzheimer's dementia with behavioral disturbance (H)  (primary encounter diagnosis)  (F22) Paranoia (H)  Comment: severe deficits with poor functional status. Paranoia and behavior well managed   Plan: continue risperidone, ativan prn, haldol prn. Memory Care staff assistance with cares, meals, activities and med admin.    Discussed with her son Frank Alexander.     (E46) Protein-calorie malnutrition, unspecified severity (H)  Comment: weight stable at 100-102 lbs. Oral intake fair   Plan: encourage supplements     (I42.9) Cardiomyopathy, unspecified type (H)  (R60.0) Lower extremity edema  Comment: minimal edema on exam. No s/s of volume overload   EF 46% in 9/2019  Plan: continue lasix, metoprolol. Monitor symptoms     (I47.1) SVT (supraventricular tachycardia) (H)  (I10) Essential hypertension  Comment: HR in the 70s on exam   Plan: continue metoprolol    (Z51.5) Hospice care patient  Comment: son was told that she may not meet criteria at next recertification and is accepting of this. Goals of care would remain comfort focused.   Plan: continue hospice care       Electronically signed by:  MARTY Uribe CNP

## 2022-01-03 NOTE — LETTER
1/3/2022        RE: Frances Alexander  [facility]  [address]  [facility] [address]        [place] [facility]  [place] Medical Record Number:  [id number]  Place of Service where encounter took place:  [facility] [[id number]]  Chief Complaint   Patient presents with     RECHECK       HPI:    Frances Alexander  is a 86 year old (1935), who is being seen today for an episodic care visit.  HPI information obtained from: facility chart records, facility staff, patient report, [facility] chart review and family/first contact son  report.She is receiving care with [facility].        Today's concern is:     Late onset Alzheimer's dementia with behavioral disturbance (H)  Paranoia (H)  Protein-calorie malnutrition, unspecified severity (H)  Cardiomyopathy, unspecified type (H)  SVT (supraventricular tachycardia) (H)  Lower extremity edema  Essential hypertension  Hospice care patient   She is a poor historian. Smiles and interactive. Conversation  nonsensical. Ambulates without a device. No falls reported. Can be paranoid,  but no significant behavior issues. Requires supervision to min assist with cares. Nurse reports an occasional dry cough. No other symptoms, no hypoxia and afebrile.     Past Medical and Surgical History reviewed in Epic today.    MEDICATIONS:    Current Outpatient Medications   Medication Sig Dispense Refill     acetaminophen (TYLENOL) 650 MG suppository Place 650 mg rectally daily as needed for fever       atropine 1 % SOLN Place 2 drops under the tongue every 2 hours as needed for secretions       bisacodyl (DULCOLAX) 10 MG suppository Place 10 mg rectally daily as needed for constipation       brimonidine (ALPHAGAN) 0.2 % ophthalmic solution Place 1 drop into both eyes 2 times daily (Patient taking differently: Place 1 drop into the right eye 2 times daily ) 1 Bottle 3     dorzolamide-timolol PF (COSOPT) 22.3-6.8 MG/ML  opthalmic solution Place 1 drop into the right eye 2 times daily        furosemide (LASIX) 20 MG tablet Take 30 mg by mouth daily       haloperidol (HALDOL) 0.5 MG tablet Take 0.5 mg by mouth every 6 hours as needed for agitation       latanoprost (XALATAN) 0.005 % ophthalmic solution INSTILL 1 DROP INTO RIGHT EYE EVERY DAY AT BEDTIME *ALLOW FOR 5 MINUTES IN BETWEEN EYE DROPS* 2.5 mL 10     levothyroxine (SYNTHROID/LEVOTHROID) 75 MCG tablet 1 TABLET ORALLY DAILY (DX: HYPOTHYROIDISM) 28 tablet 10     LORazepam (ATIVAN) 0.5 MG tablet Take 0.25 mg by mouth every 4 hours as needed for anxiety       LUBRICATING EYE DROPS 0.4-0.3 % SOLN ophthalmic solution PLACE DROP(S) INTO AFFECTED EYE(S) 2 TO 4 TIMES DAILY AS NEEDED FOR DRYNESS (Patient taking differently: Place 1 drop into both eyes 2 times daily as needed ) 15 mL 97     metoprolol succinate ER (TOPROL-XL) 25 MG 24 hr tablet Take 1 tablet (25 mg) by mouth daily 16 tablet 11     morphine 2.5 MG solu-tab Take 2.5 mg by mouth every 2 hours as needed for shortness of breath / dyspnea or moderate to severe pain       RHOPRESSA 0.02 % ophthalmic solution INSTILL 1 DROP INTO RIGHT EYE EVERY DAY AT BEDTIME *ALLOW FOR 5 MINUTES IN BETWEEN EYE DROPS* 2.5 mL 10     risperiDONE (RISPERDAL M-TABS) 0.25 MG ODT tab PLACE 1 TABLET UNDER THE TONGUE EVERY EVENING;PLACE 1 TABLET UNDER THE TONGUE DAILY AS NEEDED (DX: DELUSIONS/HALLUCINATIONS) (Patient taking differently: 0.5 mg At Bedtime ) 30 tablet 10     risperiDONE (RISPERDAL) 0.25 MG tablet Take 1 tablet (0.25 mg) by mouth daily And daily PRN (Patient taking differently: Take 0.25 mg by mouth daily as needed ) 30 tablet 3     sennosides (SENOKOT) 8.6 MG tablet Take 1 tablet by mouth 2 times daily as needed for constipation           REVIEW OF SYSTEMS:  Unobtainable secondary to cognitive impairment.     Objective:  BP 95/67   Pulse 85   Temp 97.5  F (36.4  C)   Resp 20   Wt 45.4 kg (100 lb)   BMI 18.29 kg/m    Exam:  GENERAL  APPEARANCE:  Alert, in no distress, thin  ENT:  [place], oropharynx clear  EYES:  EOM normal, conjunctiva and lids normal  NECK: no adenopathy,masses or thyromegaly  RESP:  lungs clear to auscultation , no respiratory distress  CV:  regular rate and rhythm, no murmur, +2 pedal pulses, peripheral edema trace+ in both LE  ABDOMEN:  soft, non-tender, no distension, no masses  M/S:  up in chair.  VALENTINO with good strength. No joint inflammation  SKIN:  no rashes or open areas  PSYCH:  oriented to self, insight and judgement impaired, memory impaired , affect and mood normal    Labs:   Recent labs in [place] reviewed by me today.     ASSESSMENT / PLAN:  (G30.1,  F02.81) Late onset Alzheimer's dementia with behavioral disturbance (H)  (primary encounter diagnosis)  (F22) Paranoia (H)  Comment: severe deficits with poor functional status. Paranoia and behavior well managed   Plan: continue risperidone, ativan prn, haldol prn. Memory Care staff assistance with cares, meals, activities and med admin.    Discussed with her son Frank Alexander.     (E46) Protein-calorie malnutrition, unspecified severity (H)  Comment: weight stable at 100-102 lbs. Oral intake fair   Plan: encourage supplements     (I42.9) Cardiomyopathy, unspecified type (H)  (R60.0) Lower extremity edema  Comment: minimal edema on exam. No s/s of volume overload   EF 46% in 9/2019  Plan: continue lasix, metoprolol. Monitor symptoms     (I47.1) SVT (supraventricular tachycardia) (H)  (I10) Essential hypertension  Comment: HR in the 70s on exam   Plan: continue metoprolol    (Z51.5) Hospice care patient  Comment: son was told that she may not meet criteria at next recertification and is accepting of this. Goals of care would remain comfort focused.   Plan: continue hospice care       Electronically signed by:  MARTY Uribe CNP               Sincerely,        MARTY Uribe CNP

## 2022-01-17 ENCOUNTER — ASSISTED LIVING VISIT (OUTPATIENT)
Dept: GERIATRICS | Facility: CLINIC | Age: 87
End: 2022-01-17
Payer: OTHER MISCELLANEOUS

## 2022-01-17 VITALS
WEIGHT: 100 LBS | OXYGEN SATURATION: 96 % | DIASTOLIC BLOOD PRESSURE: 67 MMHG | BODY MASS INDEX: 18.4 KG/M2 | SYSTOLIC BLOOD PRESSURE: 95 MMHG | TEMPERATURE: 97.2 F | HEIGHT: 62 IN | RESPIRATION RATE: 20 BRPM | HEART RATE: 85 BPM

## 2022-01-17 DIAGNOSIS — G30.1 LATE ONSET ALZHEIMER'S DEMENTIA WITH BEHAVIORAL DISTURBANCE (H): ICD-10-CM

## 2022-01-17 DIAGNOSIS — F22 PARANOIA (H): ICD-10-CM

## 2022-01-17 DIAGNOSIS — E46 PROTEIN-CALORIE MALNUTRITION, UNSPECIFIED SEVERITY (H): ICD-10-CM

## 2022-01-17 DIAGNOSIS — Z51.5 HOSPICE CARE PATIENT: ICD-10-CM

## 2022-01-17 DIAGNOSIS — F02.818 LATE ONSET ALZHEIMER'S DEMENTIA WITH BEHAVIORAL DISTURBANCE (H): ICD-10-CM

## 2022-01-17 DIAGNOSIS — I47.10 SVT (SUPRAVENTRICULAR TACHYCARDIA) (H): ICD-10-CM

## 2022-01-17 DIAGNOSIS — I42.9 CARDIOMYOPATHY, UNSPECIFIED TYPE (H): Primary | ICD-10-CM

## 2022-01-17 DIAGNOSIS — I10 ESSENTIAL HYPERTENSION: ICD-10-CM

## 2022-01-17 RX ORDER — FUROSEMIDE 20 MG
20 TABLET ORAL DAILY
Qty: 30 TABLET | Refills: 11 | Status: SHIPPED | OUTPATIENT
Start: 2022-01-17 | End: 2022-01-01

## 2022-01-17 ASSESSMENT — MIFFLIN-ST. JEOR: SCORE: 846.85

## 2022-01-17 NOTE — PROGRESS NOTES
"Frances Alexander is a 86 year old female seen January 17, 2022 at [facility] Memory Care unit where she has resided for 2 years (admit 10/2019) seen to follow up dementia, HTN and hypothyroidism.   Pt is seen on the unit up ambulating without device.  She is in good spirits, less edgy than at previous visits.   Considerable conversation, but most of that is nonsensical.  When asked questions about how she is doing, she gives such random answers as \"What happened to my hair?\" and \"I've got a heart for two people.\"    By chart review, patient was living independently in [place], but family noticed cognitive decline with weight loss and moved her to [place] in August 2019, to an AL in [place].   She did not do well there, and moved to Memory Care for additional support.  She eventually settled after a couple of elopement attempts and concerns about staff coming into her apartment for med administration.    Pt was seen by Cardiology for palpitations, with Ziopatch revealing >800 short runs of SVT and one 5 beat run of VT.    She was started on metoprolol.  She was also noted to have mild cardiomyopathy of unclear etiology, EF 46%, mild -mod TR and mild pulmonary HTN  In April 2021 pt signed on to Hospice with dx of protein-calorie malnutrition, delusional disorder and dementia.   She was recertified in November 2021      Past Medical History:   Diagnosis Date     Cardiomyopathy (H)     mild, EF 46%      Essential hypertension      Hypothyroidism 8/30/2019     Irregular heart beat 8/30/2019     NSVT (nonsustained ventricular tachycardia) (H)      Vitamin D deficiency 8/30/2019   Breast cancer, s/p Right mastectomy    SH:  , lives [place] Memory Care unit.  Moved to [place] from [place]   She has a son Frank, daughter-in-law Lisa and 2 grandchildren that live in [place].     Her daughter Sherrell and a granddaughter live in [place]    She also has a sister Kajal.   Non smoker    ROS:  SLUMS 18/30   " ">>>>now 5/30    Hearing loss  Wt Readings from Last 5 Encounters:   01/17/22 45.4 kg (100 lb)   01/03/22 45.4 kg (100 lb)   11/08/21 46.5 kg (102 lb 8 oz)   07/21/21 45.2 kg (99 lb 9.6 oz)   05/12/21 42.2 kg (93 lb)      EXAM:  NAD  BP 95/67   Pulse 85   Temp 97.2  F (36.2  C)   Resp 20   Ht 1.575 m (5' 2\")   Wt 45.4 kg (100 lb)   SpO2 96%   BMI 18.29 kg/m     Neck supple without adenopathy  S/p right mastectomy  Lungs with decreased BS, no rales or wheeze  Heart RRR s1s2  Abd soft, NT, no distention, +BS  Ext without edema  Neuro: ambulatory without device, disoriented, +STML, confabulatory  Psych: affect okay, cheerful    Labs reviewed     9/27/19 Lexiscan     Impression:  1.  Myocardial perfusion imaging using single isotope technique  demonstrated normal tracer uptake at rest and stress without evidence for ischemia or infarction.   2.  Gated images demonstrated mild global hypokinesis.  The left  ventricular systolic function is mildly reduced ejection fraction of 46%.      IMP/PLAN:   (I42.9) Cardiomyopathy, unspecified type (H)  Comment: unclear etiology, does not appear to be symptomatic     (I10) Benign essential hypertension   Comment: GFR >60     BP Readings from Last 3 Encounters:   01/17/22 95/67   01/03/22 95/67   11/08/21 118/68      Pulse Readings from Last 4 Encounters:   01/17/22 85   01/03/22 85   11/08/21 66   03/29/21 99      Plan: lisinopril stopped secondary to hypotension   Remains on metoprolol 25 mg/day which she also needs for VR control  Today will decrease furosemide to 20 mg/day and follow exam     (E44.0) Moderate protein-calorie malnutrition (H)  Comment: low BMI  Plan: preferences, assist to meals, nutritional supplement.       (E03.9) Hypothyroidism, unspecified type  Comment:   TSH   Date Value Ref Range Status   06/09/2021 2.66 0.30 - 5.00 uIU/mL Final   03/16/2021 0.97 0.30 - 5.00 uIU/mL Final      Plan: levothyroxine 75 mcg/day    (I47.1) SVT (supraventricular " tachycardia) (H)  Comment: >800 runs on Ziopatch reading   Plan: metoprolol ER 25 mg/day       (G30.1,  F02.81) Late onset Alzheimer's dementia with behavioral disturbance (H)  Comment: confusion and disorientation, low functional status with preserved language and mobility  Plan: [place] Memory Care unit for assist with meds, meals, activity; secure unit for safety    (F22) Paranoia (H)  Comment: and delusional thinking, sometimes interferes with cares   Plan: risperidone 0.25 mg/HS and prn     (Z51.5) Hospice care patient  Advanced directive: pt is DNR/DNI per signed POLST.  Pt is on [facility] since 4/2021    Alyssa Sandra MD

## 2022-01-17 NOTE — LETTER
"    1/17/2022        RE: Frances Alexander  [facility]  [address]  [facility] [address]        Frances Alexander is a 86 year old female seen January 17, 2022 at [facility] Memory Care unit where she has resided for 2 years (admit 10/2019) seen to follow up dementia, HTN and hypothyroidism.   Pt is seen on the unit up ambulating without device.  She is in good spirits, less edgy than at previous visits.   Considerable conversation, but most of that is nonsensical.  When asked questions about how she is doing, she gives such random answers as \"What happened to my hair?\" and \"I've got a heart for two people.\"    By chart review, patient was living independently in [place], but family noticed cognitive decline with weight loss and moved her to [place] in August 2019, to an AL in [place].   She did not do well there, and moved to Memory Care for additional support.  She eventually settled after a couple of elopement attempts and concerns about staff coming into her apartment for med administration.    Pt was seen by Cardiology for palpitations, with Ziopatch revealing >800 short runs of SVT and one 5 beat run of VT.    She was started on metoprolol.  She was also noted to have mild cardiomyopathy of unclear etiology, EF 46%, mild -mod TR and mild pulmonary HTN  In April 2021 pt signed on to Hospice with dx of protein-calorie malnutrition, delusional disorder and dementia.   She was recertified in November 2021      Past Medical History:   Diagnosis Date     Cardiomyopathy (H)     mild, EF 46%      Essential hypertension      Hypothyroidism 8/30/2019     Irregular heart beat 8/30/2019     NSVT (nonsustained ventricular tachycardia) (H)      Vitamin D deficiency 8/30/2019   Breast cancer, s/p Right mastectomy    SH:  , lives [place] Memory Care unit.  Moved to [place] from [place]   She has a son Frank, daughter-in-law Lisa and 2 grandchildren that live in [place].     Her daughter Sherrell and a " "granddaughter live in [place]    She also has a sister Kajal.   Non smoker    ROS:  SLUMS 18/30   >>>>now 5/30    Hearing loss  Wt Readings from Last 5 Encounters:   01/17/22 45.4 kg (100 lb)   01/03/22 45.4 kg (100 lb)   11/08/21 46.5 kg (102 lb 8 oz)   07/21/21 45.2 kg (99 lb 9.6 oz)   05/12/21 42.2 kg (93 lb)      EXAM:  NAD  BP 95/67   Pulse 85   Temp 97.2  F (36.2  C)   Resp 20   Ht 1.575 m (5' 2\")   Wt 45.4 kg (100 lb)   SpO2 96%   BMI 18.29 kg/m     Neck supple without adenopathy  S/p right mastectomy  Lungs with decreased BS, no rales or wheeze  Heart RRR s1s2  Abd soft, NT, no distention, +BS  Ext without edema  Neuro: ambulatory without device, disoriented, +STML, confabulatory  Psych: affect okay, cheerful    Labs reviewed     9/27/19 Lexiscan     Impression:  1.  Myocardial perfusion imaging using single isotope technique  demonstrated normal tracer uptake at rest and stress without evidence for ischemia or infarction.   2.  Gated images demonstrated mild global hypokinesis.  The left  ventricular systolic function is mildly reduced ejection fraction of 46%.      IMP/PLAN:   (I42.9) Cardiomyopathy, unspecified type (H)  Comment: unclear etiology, does not appear to be symptomatic     (I10) Benign essential hypertension   Comment: GFR >60     BP Readings from Last 3 Encounters:   01/17/22 95/67   01/03/22 95/67   11/08/21 118/68      Pulse Readings from Last 4 Encounters:   01/17/22 85   01/03/22 85   11/08/21 66   03/29/21 99      Plan: lisinopril stopped secondary to hypotension   Remains on metoprolol 25 mg/day which she also needs for VR control  Today will decrease furosemide to 20 mg/day and follow exam     (E44.0) Moderate protein-calorie malnutrition (H)  Comment: low BMI  Plan: preferences, assist to meals, nutritional supplement.       (E03.9) Hypothyroidism, unspecified type  Comment:   TSH   Date Value Ref Range Status   06/09/2021 2.66 0.30 - 5.00 uIU/mL Final   03/16/2021 0.97 0.30 " - 5.00 uIU/mL Final      Plan: levothyroxine 75 mcg/day    (I47.1) SVT (supraventricular tachycardia) (H)  Comment: >800 runs on Ziopatch reading   Plan: metoprolol ER 25 mg/day       (G30.1,  F02.81) Late onset Alzheimer's dementia with behavioral disturbance (H)  Comment: confusion and disorientation, low functional status with preserved language and mobility  Plan: [place] Memory Care unit for assist with meds, meals, activity; secure unit for safety    (F22) Paranoia (H)  Comment: and delusional thinking, sometimes interferes with cares   Plan: risperidone 0.25 mg/HS and prn     (Z51.5) Hospice care patient  Advanced directive: pt is DNR/DNI per signed POLST.  Pt is on [facility] since 4/2021    Alyssa Sandra MD         Sincerely,        Alyssa Sandra MD

## 2022-02-01 DIAGNOSIS — I47.29 NONSUSTAINED VENTRICULAR TACHYCARDIA (H): ICD-10-CM

## 2022-02-07 RX ORDER — METOPROLOL SUCCINATE 25 MG/1
TABLET, EXTENDED RELEASE ORAL
Qty: 28 TABLET | Refills: 11 | Status: SHIPPED | OUTPATIENT
Start: 2022-02-07 | End: 2023-01-01

## 2022-03-20 VITALS
RESPIRATION RATE: 20 BRPM | SYSTOLIC BLOOD PRESSURE: 122 MMHG | DIASTOLIC BLOOD PRESSURE: 91 MMHG | WEIGHT: 102 LBS | TEMPERATURE: 96.4 F | HEART RATE: 64 BPM | BODY MASS INDEX: 18.66 KG/M2

## 2022-03-21 ENCOUNTER — ASSISTED LIVING VISIT (OUTPATIENT)
Dept: GERIATRICS | Facility: CLINIC | Age: 87
End: 2022-03-21
Payer: OTHER MISCELLANEOUS

## 2022-03-21 DIAGNOSIS — I42.9 CARDIOMYOPATHY, UNSPECIFIED TYPE (H): ICD-10-CM

## 2022-03-21 DIAGNOSIS — G30.1 LATE ONSET ALZHEIMER'S DEMENTIA WITH BEHAVIORAL DISTURBANCE (H): Primary | ICD-10-CM

## 2022-03-21 DIAGNOSIS — I47.10 SVT (SUPRAVENTRICULAR TACHYCARDIA) (H): ICD-10-CM

## 2022-03-21 DIAGNOSIS — Z51.5 HOSPICE CARE PATIENT: ICD-10-CM

## 2022-03-21 DIAGNOSIS — F22 PARANOIA (H): ICD-10-CM

## 2022-03-21 DIAGNOSIS — E46 PROTEIN-CALORIE MALNUTRITION, UNSPECIFIED SEVERITY (H): ICD-10-CM

## 2022-03-21 DIAGNOSIS — F02.818 LATE ONSET ALZHEIMER'S DEMENTIA WITH BEHAVIORAL DISTURBANCE (H): Primary | ICD-10-CM

## 2022-03-21 DIAGNOSIS — I10 ESSENTIAL HYPERTENSION: ICD-10-CM

## 2022-03-21 NOTE — PROGRESS NOTES
[facility]    Chief Complaint   Patient presents with     RECHECK     HPI:  Frances Alexander is a 86 year old  (1935), who is being seen today for an episodic care visit at: [facility] ([facility]) [[id number]].   She has lived in Memory Care at this facility since 10/2019. Medical history significant for dementia, HTN, cardiomyopathy, SVT, remote breast cancer, hypothyroidism. She is receiving care with [facility].     Today's concern is:      Late onset Alzheimer's dementia with behavioral disturbance (H)  Paranoia (H)  Protein-calorie malnutrition, unspecified severity (H)  Cardiomyopathy, unspecified type (H)  SVT (supraventricular tachycardia) (H)  Essential hypertension  Hospice care patient   She is a poor historian. Alert and interactive, conversation is nonsensical. Up ad zaid without a device. Requires assist of 1 with cares. Remains on hospice.       Allergies, and PMH/PSH reviewed in EPIC today    REVIEW OF SYSTEMS:  Unobtainable secondary to cognitive impairment.     Objective:   BP (!) 122/91   Pulse 64   Temp (!) 96.4  F (35.8  C)   Resp 20   Wt 46.3 kg (102 lb)   BMI 18.66 kg/m    GENERAL APPEARANCE:  Alert, in no distress, thin, frail appearing   ENT:  [place], oropharynx clear  EYES:  EOM normal, conjunctiva and lids normal  NECK: no adenopathy,masses or thyromegaly  RESP:  lungs clear to auscultation , no respiratory distress  CV:  regular rate and rhythm, no murmur, peripheral edema trace+ in both LE  ABDOMEN:  soft, non-tender, no distension, no masses  M/S:   gait steady without a  device. VALENTINO with good strength. No joint inflammation  SKIN:  no rashes or open areas  PSYCH:  oriented to self, insight and judgement impaired, memory impaired , affect and mood normal      Recent labs in [facility] reviewed by me today.     ASSESSMENT / PLAN:  (G30.1,  F02.81) Late onset Alzheimer's dementia with behavioral disturbance (H)  (primary encounter  diagnosis)  (F22) Paranoia (H)  Comment: advanced disease with loss of language and poor functional status. Paranoia and behavior issues are well managed   Plan: continue risperidone, ativan prn, haldol prn. Memory Care staff to assist with all cares, meals and med admin.    (E46) Protein-calorie malnutrition, unspecified severity (H)  Comment:weight is unchanged at 102 lbs. Oral intake fair  Plan: encourage supplements. Supervision at meals     (I42.9) Cardiomyopathy, unspecified type (H)  Comment: minimal edema on exam and no overt cardiac symptoms  EF 46% in 9/20219   Plan: continue lasix, metoprolol     (I47.1) SVT (supraventricular tachycardia) (H)  (I10) Essential hypertension  Comment: controlled with BPs: 134/87, 122/91  HR: 64-77  Plan: continue metoprolol. Avoid hypotension due to advanced age and fall risk     (Z51.5) Hospice care patient  Comment: comfort meds are available   Plan: continue comfort care with [facility]         Electronically signed by: MARTY Uribe CNP

## 2022-03-21 NOTE — LETTER
3/21/2022        RE: Frances Alexander  C/o Frank Alexander  [address]  [facility] [address]        [facility]    Chief Complaint   Patient presents with     RECHECK     HPI:  Frances Alexander is a 86 year old  (1935), who is being seen today for an episodic care visit at: [facility] [[id number]].   She has lived in Memory Care at this facility since 10/2019. Medical history significant for dementia, HTN, cardiomyopathy, SVT, remote breast cancer, hypothyroidism. She is receiving care with [facility].     Today's concern is:      Late onset Alzheimer's dementia with behavioral disturbance (H)  Paranoia (H)  Protein-calorie malnutrition, unspecified severity (H)  Cardiomyopathy, unspecified type (H)  SVT (supraventricular tachycardia) (H)  Essential hypertension  Hospice care patient   She is a poor historian. Alert and interactive, conversation is nonsensical. Up ad zaid without a device. Requires assist of 1 with cares. Remains on hospice.       Allergies, and PMH/PSH reviewed in EPIC today    REVIEW OF SYSTEMS:  Unobtainable secondary to cognitive impairment.     Objective:   BP (!) 122/91   Pulse 64   Temp (!) 96.4  F (35.8  C)   Resp 20   Wt 46.3 kg (102 lb)   BMI 18.66 kg/m    GENERAL APPEARANCE:  Alert, in no distress, thin, frail appearing   ENT:  [place], oropharynx clear  EYES:  EOM normal, conjunctiva and lids normal  NECK: no adenopathy,masses or thyromegaly  RESP:  lungs clear to auscultation , no respiratory distress  CV:  regular rate and rhythm, no murmur, peripheral edema trace+ in both LE  ABDOMEN:  soft, non-tender, no distension, no masses  M/S:   gait steady without a  device. VALENTINO with good strength. No joint inflammation  SKIN:  no rashes or open areas  PSYCH:  oriented to self, insight and judgement impaired, memory impaired , affect and mood normal      Recent labs in [employer] reviewed by me today.     ASSESSMENT / PLAN:  (G30.1,   F02.81) Late onset Alzheimer's dementia with behavioral disturbance (H)  (primary encounter diagnosis)  (F22) Paranoia (H)  Comment: advanced disease with loss of language and poor functional status. Paranoia and behavior issues are well managed   Plan: continue risperidone, ativan prn, haldol prn. Memory Care staff to assist with all cares, meals and med admin.    (E46) Protein-calorie malnutrition, unspecified severity (H)  Comment:weight is unchanged at 102 lbs. Oral intake fair  Plan: encourage supplements. Supervision at meals     (I42.9) Cardiomyopathy, unspecified type (H)  Comment: minimal edema on exam and no overt cardiac symptoms  EF 46% in 9/20219   Plan: continue lasix, metoprolol     (I47.1) SVT (supraventricular tachycardia) (H)  (I10) Essential hypertension  Comment: controlled with BPs: 134/87, 122/91  HR: 64-77  Plan: continue metoprolol. Avoid hypotension due to advanced age and fall risk     (Z51.5) Hospice care patient  Comment: comfort meds are available   Plan: continue comfort care with [facility]         Electronically signed by: MARTY Uribe CNP             Sincerely,        MARTY Uribe CNP

## 2022-03-24 DIAGNOSIS — H40.003 GLAUCOMA SUSPECT, BILATERAL: Primary | ICD-10-CM

## 2022-03-25 RX ORDER — DORZOLAMIDE HYDROCHLORIDE AND TIMOLOL MALEATE 20; 5 MG/ML; MG/ML
SOLUTION/ DROPS OPHTHALMIC
Qty: 10 ML | Refills: 10 | Status: SHIPPED | OUTPATIENT
Start: 2022-03-25

## 2022-05-23 NOTE — LETTER
5/23/2022        RE: Frances Alexander  C/o Frank Alexander  [address]  [facility] [address]        [facility] GERIATRICS    Chief Complaint   Patient presents with     RECHECK     HPI:  Frances Alexander is a 86 year old  (1935), who is being seen today for an episodic care visit at: [facility] ([facility]) [[id number]].   She has lived in Memory Care at this facility since 10/2019. Medical history significant for dementia, HTN, cardiomyopathy, SVT, remote breast cancer, hypothyroidism. She is receiving care with [facility].     Today's concern is:      Late onset Alzheimer's dementia with behavioral disturbance (H)  Paranoia (H)  Protein-calorie malnutrition, unspecified severity (H)  Cardiomyopathy, unspecified type (H)  SVT (supraventricular tachycardia) (H)  Essential hypertension  Lower extremity edema  Hospice care patient   She is unable to provide history. Smiles and says a few nonsensical words. Has a small pressure area on her right 5th toe with intact skin. No other issues reported by staff. Ambulates without a device. No recent falls. Requires assist of 1 with cares.       Allergies, and PMH/PSH reviewed in EPIC today    REVIEW OF SYSTEMS:  Unobtainable secondary to cognitive impairment.     Objective:   /84   Pulse 78   Temp 97.4  F (36.3  C)   Resp 18   Wt 48.1 kg (106 lb)   BMI 19.39 kg/m    GENERAL APPEARANCE:  Alert, in no distress, frail appearing   ENT:  [place], oropharynx clear  EYES:  EOM normal, conjunctiva and lids normal  NECK: no adenopathy or thyromegaly   RESP:  lungs clear to auscultation , no respiratory distress  CV:  regular rate and rhythm, no murmur, peripheral edema trace both LE  ABDOMEN:  soft, non-tender, no distension, no masses  M/S:  up in chair. VALENTINO with good strength. No joint inflammation  SKIN:  no rashes or open areas  PSYCH:  oriented to self, insight and judgement impaired, memory impaired , affect and mood  normal    Recent labs in [facility] reviewed by me today.     ASSESSMENT / PLAN:  (G30.1,  F02.81) Late onset Alzheimer's dementia with behavioral disturbance (H)  (primary encounter diagnosis)  (F22) Paranoia (H)  Comment: advanced disease with low functional status. Paranoia and difficult behaviors are managed   Plan: continue risperidone 0.5 mg daily and 0.25 mg daily prn. Continue haldol, lorazepam. Memory Care staff assistance with cares, meals, activities and med admin.     (E46) Protein-calorie malnutrition, unspecified severity (H)  Comment: recent weights 102-106 lbs   Plan: encourage intake, supplements. Staff assistance at meals     (I42.9) Cardiomyopathy, unspecified type (H)  Comment: appears asymptomatic. EF 46% in 9/2019  Plan: continue lasix, metoprolol. No need for labs given goals of care     (I47.1) SVT (supraventricular tachycardia) (H)  (I10) Essential hypertension  Comment: controlled with recent /84  HR 78   Plan: continue metoprolol. Avoid hypotension due to advanced age and fall risk     (R60.0) Lower extremity edema  Comment: chronic and mild   Plan: continue lasix    (Z51.5) Hospice care patient  Comment: appears comfortable   Plan: continue comfort meds and care with [facility]         Electronically signed by: MARTY Uribe CNP             Sincerely,        MARTY Uribe CNP

## 2022-05-23 NOTE — PROGRESS NOTES
[facility] GERIATRICS    Chief Complaint   Patient presents with     RECHECK     HPI:  Frances Alexander is a 86 year old  (1935), who is being seen today for an episodic care visit at: [facility] ([facility] [[id number]].   She has lived in Memory Care at this facility since 10/2019. Medical history significant for dementia, HTN, cardiomyopathy, SVT, remote breast cancer, hypothyroidism. She is receiving care with [facility].     Today's concern is:      Late onset Alzheimer's dementia with behavioral disturbance (H)  Paranoia (H)  Protein-calorie malnutrition, unspecified severity (H)  Cardiomyopathy, unspecified type (H)  SVT (supraventricular tachycardia) (H)  Essential hypertension  Lower extremity edema  Hospice care patient   She is unable to provide history. Smiles and says a few nonsensical words. Has a small pressure area on her right 5th toe with intact skin. No other issues reported by staff. Ambulates without a device. No recent falls. Requires assist of 1 with cares.       Allergies, and PMH/PSH reviewed in EPIC today    REVIEW OF SYSTEMS:  Unobtainable secondary to cognitive impairment.     Objective:   /84   Pulse 78   Temp 97.4  F (36.3  C)   Resp 18   Wt 48.1 kg (106 lb)   BMI 19.39 kg/m    GENERAL APPEARANCE:  Alert, in no distress, frail appearing   ENT:  [place], oropharynx clear  EYES:  EOM normal, conjunctiva and lids normal  NECK: no adenopathy or thyromegaly   RESP:  lungs clear to auscultation , no respiratory distress  CV:  regular rate and rhythm, no murmur, peripheral edema trace both LE  ABDOMEN:  soft, non-tender, no distension, no masses  M/S:  up in chair. VALENTINO with good strength. No joint inflammation  SKIN:  no rashes or open areas  PSYCH:  oriented to self, insight and judgement impaired, memory impaired , affect and mood normal    Recent labs in [facility] reviewed by me today.     ASSESSMENT / PLAN:  (G30.1,  F02.81) Late onset  Alzheimer's dementia with behavioral disturbance (H)  (primary encounter diagnosis)  (F22) Paranoia (H)  Comment: advanced disease with low functional status. Paranoia and difficult behaviors are managed   Plan: continue risperidone 0.5 mg daily and 0.25 mg daily prn. Continue haldol, lorazepam. Memory Care staff assistance with cares, meals, activities and med admin.     (E46) Protein-calorie malnutrition, unspecified severity (H)  Comment: recent weights 102-106 lbs   Plan: encourage intake, supplements. Staff assistance at meals     (I42.9) Cardiomyopathy, unspecified type (H)  Comment: appears asymptomatic. EF 46% in 9/2019  Plan: continue lasix, metoprolol. No need for labs given goals of care     (I47.1) SVT (supraventricular tachycardia) (H)  (I10) Essential hypertension  Comment: controlled with recent /84  HR 78   Plan: continue metoprolol. Avoid hypotension due to advanced age and fall risk     (R60.0) Lower extremity edema  Comment: chronic and mild   Plan: continue lasix    (Z51.5) Hospice care patient  Comment: appears comfortable   Plan: continue comfort meds and care with [facility]         Electronically signed by: MARTY Uribe CNP

## 2022-07-11 NOTE — PROGRESS NOTES
[facility] GERIATRICS    Chief Complaint   Patient presents with     RECHECK     HPI:  Frances Alexander is a 87 year old  (1935), who is being seen today for an episodic care visit at: [facility] ([facility]) [[id number]].  She has lived in Memory Care at this facility since 10/2019. Medical history significant for dementia, HTN, cardiomyopathy, SVT, remote breast cancer, hypothyroidism.   She was receiving care with [facility] and was discharged 6/8/2022 due to stability.      Today's concern is:      Late onset Alzheimer's dementia with behavioral disturbance (H)  Paranoia (H)  Protein-calorie malnutrition, unspecified severity (H)  Cardiomyopathy, unspecified type (H)  Lower extremity edema  SVT (supraventricular tachycardia) (H)  Essential hypertension  Hypothyroidism, unspecified type   She is a poor historian. Alert and smiling, conversation is nonsensical. Good appetite. Staff reports no issues. Ambulates without a device. No falls. Requires assist of 1 with cares.       Allergies, and PMH/PSH reviewed in EPIC today    REVIEW OF SYSTEMS:  Unable to obtain due to dementia.     Objective:   /76   Pulse 63   Temp 97.8  F (36.6  C)   Resp 20   Wt 55.3 kg (122 lb)   BMI 22.31 kg/m    GENERAL APPEARANCE:  Alert, in no distress    ENT:  [place], oropharynx clear  EYES:  conjunctiva and lids normal  NECK: no adenopathy or thyromegaly   RESP:  lungs clear to auscultation , no respiratory distress  CV:  regular rate and rhythm, no murmur, trace edema both LE  ABDOMEN:  soft, non-tender, no distension, no masses  M/S: gait steady.  VALENTINO with good strength. No joint inflammation  SKIN:  no rashes or open areas  PSYCH:  oriented to self, insight and judgement impaired, memory impaired , affect and mood normal    Recent labs in [facility] reviewed by me today.     ASSESSMENT / PLAN:  (G30.1,  F02.81) Late onset Alzheimer's dementia with behavioral disturbance (H)  (primary  encounter diagnosis)  (F22) Paranoia (H)  Comment: severe deficits with low functional status. Paranoid and anxious at times-staff able to manage without difficulty   Plan: continue low dose risperidone. Memory Care staff assistance with cares, meals,activities and med admin.     (E46) Protein-calorie malnutrition, unspecified severity (H)  Comment: desirable weight gain. Most recent weight is 122 lbs, may not be accurate. Usual weight is 106-110 lbs.   Plan: follow weight. Encourage intake, supplements. Staff supervision at meals     (I42.9) Cardiomyopathy, unspecified type (H)  Comment: no acute symptoms. EF 6% in 9/2019  Plan: continue lasix, metoprolol     (R60.0) Lower extremity edema  Comment: chronic, well managed   Plan: continue lasix. BMP in the near future-will discuss with family prior to ordering labs.     (I47.1) SVT (supraventricular tachycardia) (H)  (I10) Essential hypertension  Comment: controlled with /70, 113/76  HR: 63-74  Plan: continue low dose metoprolol and lasix. Monitor VS and adjust dose to avoid hypotension.     (E03.9) Hypothyroidism, unspecified type  Comment: TSH 2.66 on 6/9/2021  Plan: continue levothyroxine. Recheck TSH        Electronically signed by: MARTY Uribe CNP

## 2022-07-11 NOTE — LETTER
7/11/2022        RE: Frances Alexander  C/o Frank Alexander  [address]  [facility] [address]        [facility] GERIATRICS    Chief Complaint   Patient presents with     RECHECK     HPI:  Frances Alexander is a 87 year old  (1935), who is being seen today for an episodic care visit at: [facility] [[id number]].  She has lived in Memory Care at this facility since 10/2019. Medical history significant for dementia, HTN, cardiomyopathy, SVT, remote breast cancer, hypothyroidism.   She was receiving care with [facility] and was discharged 6/8/2022 due to stability.      Today's concern is:      Late onset Alzheimer's dementia with behavioral disturbance (H)  Paranoia (H)  Protein-calorie malnutrition, unspecified severity (H)  Cardiomyopathy, unspecified type (H)  Lower extremity edema  SVT (supraventricular tachycardia) (H)  Essential hypertension  Hypothyroidism, unspecified type   She is a poor historian. Alert and smiling, conversation is nonsensical. Good appetite. Staff reports no issues. Ambulates without a device. No falls. Requires assist of 1 with cares.       Allergies, and PMH/PSH reviewed in EPIC today    REVIEW OF SYSTEMS:  Unable to obtain due to dementia.     Objective:   /76   Pulse 63   Temp 97.8  F (36.6  C)   Resp 20   Wt 55.3 kg (122 lb)   BMI 22.31 kg/m    GENERAL APPEARANCE:  Alert, in no distress    ENT:  [place], oropharynx clear  EYES:  conjunctiva and lids normal  NECK: no adenopathy or thyromegaly   RESP:  lungs clear to auscultation , no respiratory distress  CV:  regular rate and rhythm, no murmur, trace edema both LE  ABDOMEN:  soft, non-tender, no distension, no masses  M/S: gait steady.  VALENTINO with good strength. No joint inflammation  SKIN:  no rashes or open areas  PSYCH:  oriented to self, insight and judgement impaired, memory impaired , affect and mood normal    Recent labs in [facility] reviewed by me today.     ASSESSMENT  / PLAN:  (G30.1,  F02.81) Late onset Alzheimer's dementia with behavioral disturbance (H)  (primary encounter diagnosis)  (F22) Paranoia (H)  Comment: severe deficits with low functional status. Paranoid and anxious at times-staff able to manage without difficulty   Plan: continue low dose risperidone. Memory Care staff assistance with cares, meals,activities and med admin.     (E46) Protein-calorie malnutrition, unspecified severity (H)  Comment: desirable weight gain. Most recent weight is 122 lbs, may not be accurate. Usual weight is 106-110 lbs.   Plan: follow weight. Encourage intake, supplements. Staff supervision at meals     (I42.9) Cardiomyopathy, unspecified type (H)  Comment: no acute symptoms. EF 6% in 9/2019  Plan: continue lasix, metoprolol     (R60.0) Lower extremity edema  Comment: chronic, well managed   Plan: continue lasix. BMP in the near future-will discuss with family prior to ordering labs.     (I47.1) SVT (supraventricular tachycardia) (H)  (I10) Essential hypertension  Comment: controlled with /70, 113/76  HR: 63-74  Plan: continue low dose metoprolol and lasix. Monitor VS and adjust dose to avoid hypotension.     (E03.9) Hypothyroidism, unspecified type  Comment: TSH 2.66 on 6/9/2021  Plan: continue levothyroxine. Recheck TSH        Electronically signed by: MARTY Uribe CNP             Sincerely,        MARTY Uribe CNP

## 2022-08-01 NOTE — PROGRESS NOTES
[facility] GERIATRICS    Chief Complaint   Patient presents with     RECHECK     HPI:  Frances Alexander is a 87 year old  (1935), who is being seen today for an episodic care visit at: [facility] ([facility]) [[id number]].  She has lived in Memory Care at this facility since 10/2019. Medical history significant for dementia, HTN, cardiomyopathy, SVT, remote breast cancer, hypothyroidism.   She was receiving care with [facility] and was discharged 6/8/2022 due to stability.      Today's concern is:      Infection due to 2019 novel coronavirus  Late onset Alzheimer's dementia with behavioral disturbance (H)  Paranoia (H)  Protein-calorie malnutrition, unspecified severity (H)  Cardiomyopathy, unspecified type (H)  Lower extremity edema  SVT (supraventricular tachycardia) (H)  Essential hypertension  She is seen today after testing positive for COVID-19 on 7/25/2022. There is an outbreak on the unit. She is unable to provide history. Smiles and says a few words. Does not respond to questions. Staff reports she's had a mild cough, but otherwise appears asymptomatic. Afebrile, no hypoxia. Good appetite. Ambulates without a device. No recent falls. Requires assist of 1 with cares.     Allergies, and PMH/PSH reviewed in EPIC today    REVIEW OF SYSTEMS:  Unable to obtain due to dementia. Positives as noted under HPI     Objective:   BP (!) 145/67   Pulse 90   Temp 97.7  F (36.5  C)   Resp 20   GENERAL APPEARANCE:  Alert, in no distress, thin   ENT:  [place], oropharynx clear  EYES:  conjunctiva and lids normal  NECK: no adenopathy or thyromegaly   RESP:  lungs clear to auscultation  CV:  regular rate and rhythm, no murmur, trace edema both LE  ABDOMEN:  soft, non-tender, no distension, no masses  M/S: up in chair. VALENTINO with good strength. No joint inflammation  SKIN:  no visible rashes or open areas  PSYCH:  oriented to self, insight and judgement impaired, memory impaired ,  affect and mood normal    Recent labs in EPIC reviewed by me today.     ASSESSMENT / PLAN:  (U07.1) Infection due to 2019 novel coronavirus  (primary encounter diagnosis)  Comment: tested positive 7/25/2022, very mild symptoms. Appears at baseline today   Plan: monitor VS, O2 sats, symptoms  Discussed with her son/POA Frank Alexander, who agrees with plan of care.     (G30.1,  F02.81) Late onset Alzheimer's dementia with behavioral disturbance (H)  (F22) Paranoia (H)  Comment: severe deficits with loss of verbal skills and low functional status. Staff able to manage behaviors and episodes of increased anxiety   Plan: continue risperidone. Memory Care staff assistance with cares, meals,activities and med admin     (E46) Protein-calorie malnutrition, unspecified severity (H)  Comment: she's had a desirable weight gain, but recent weight of 122 lbs is probably not accurate. Weight is usually 106-110 lbs.   Plan: encourage intake. Follow weight.     (I42.9) Cardiomyopathy, unspecified type (H)  (R60.0) Lower extremity edema  Comment: mild LE edema, no other s/s of volume overload.   EF 46% in 9/2019  Plan: continue lasix, metoprolol. Will discuss obtaining labs with her son now that she is off hospice.     (I47.1) SVT (supraventricular tachycardia) (H)  (I10) Essential hypertension  Comment: controlled with recent /47  HR: 90  Plan: continue metoprolol and lasix. Avoid hypotension due to advanced age and fall risk         Electronically signed by: MARTY Uribe CNP

## 2022-08-01 NOTE — LETTER
8/1/2022        RE: Frances Alexander  C/o Frank Alexander  [address]  [facility] [address]        [facility] GERIATRICS    Chief Complaint   Patient presents with     RECHECK     HPI:  Frances Alexander is a 87 year old  (1935), who is being seen today for an episodic care visit at: [facility] [[id number]].  She has lived in Memory Care at this facility since 10/2019. Medical history significant for dementia, HTN, cardiomyopathy, SVT, remote breast cancer, hypothyroidism.   She was receiving care with [facility] and was discharged 6/8/2022 due to stability.      Today's concern is:      Infection due to 2019 novel coronavirus  Late onset Alzheimer's dementia with behavioral disturbance (H)  Paranoia (H)  Protein-calorie malnutrition, unspecified severity (H)  Cardiomyopathy, unspecified type (H)  Lower extremity edema  SVT (supraventricular tachycardia) (H)  Essential hypertension  She is seen today after testing positive for COVID-19 on 7/25/2022. There is an outbreak on the unit. She is unable to provide history. Smiles and says a few words. Does not respond to questions. Staff reports she's had a mild cough, but otherwise appears asymptomatic. Afebrile, no hypoxia. Good appetite. Ambulates without a device. No recent falls. Requires assist of 1 with cares.     Allergies, and PMH/PSH reviewed in EPIC today    REVIEW OF SYSTEMS:  Unable to obtain due to dementia. Positives as noted under HPI     Objective:   BP (!) 145/67   Pulse 90   Temp 97.7  F (36.5  C)   Resp 20   GENERAL APPEARANCE:  Alert, in no distress, thin   ENT:  [place], oropharynx clear  EYES:  conjunctiva and lids normal  NECK: no adenopathy or thyromegaly   RESP:  lungs clear to auscultation  CV:  regular rate and rhythm, no murmur, trace edema both LE  ABDOMEN:  soft, non-tender, no distension, no masses  M/S: up in chair. VALENTINO with good strength. No joint inflammation  SKIN:  no visible  rashes or open areas  PSYCH:  oriented to self, insight and judgement impaired, memory impaired , affect and mood normal    Recent labs in EPIC reviewed by me today.     ASSESSMENT / PLAN:  (U07.1) Infection due to 2019 novel coronavirus  (primary encounter diagnosis)  Comment: tested positive 7/25/2022, very mild symptoms. Appears at baseline today   Plan: monitor VS, O2 sats, symptoms  Discussed with her son/POA Frank Alexander, who agrees with plan of care.     (G30.1,  F02.81) Late onset Alzheimer's dementia with behavioral disturbance (H)  (F22) Paranoia (H)  Comment: severe deficits with loss of verbal skills and low functional status. Staff able to manage behaviors and episodes of increased anxiety   Plan: continue risperidone. Memory Care staff assistance with cares, meals,activities and med admin     (E46) Protein-calorie malnutrition, unspecified severity (H)  Comment: she's had a desirable weight gain, but recent weight of 122 lbs is probably not accurate. Weight is usually 106-110 lbs.   Plan: encourage intake. Follow weight.     (I42.9) Cardiomyopathy, unspecified type (H)  (R60.0) Lower extremity edema  Comment: mild LE edema, no other s/s of volume overload.   EF 46% in 9/2019  Plan: continue lasix, metoprolol. Will discuss obtaining labs with her son now that she is off hospice.     (I47.1) SVT (supraventricular tachycardia) (H)  (I10) Essential hypertension  Comment: controlled with recent /47  HR: 90  Plan: continue metoprolol and lasix. Avoid hypotension due to advanced age and fall risk         Electronically signed by: MARTY Uribe CNP             Sincerely,        MARTY Uribe CNP

## 2022-10-10 NOTE — PROGRESS NOTES
"[facility] GERIATRICS    Chief Complaint   Patient presents with     Nursing Home Acute     HPI:  Frances Alexander is a 87 year old  (1935), who is being seen today for an episodic care visit at: [facility] ([facility] [[id number]].   She has lived in Memory Care at this facility since 10/2019. Medical history significant for dementia, HTN, cardiomyopathy, SVT, remote breast cancer, hypothyroidism.   She was receiving care with [facility] and discharged 6/8/2022 due to stability.   She tested positive for COVID-19 on 7/25/2022 and had a mild cough with no other symptoms.     Today's concern is:      Late onset Alzheimer's dementia with behavioral disturbance (H)  Paranoia (H)  Protein-calorie malnutrition, unspecified severity (H)  Cardiomyopathy, unspecified type (H)  Lower extremity edema  SVT (supraventricular tachycardia) (H)  Essential hypertension  She is unable to provide history.Speaks a few words, does not respond to questions. Staff reports no new issues. No cough or respiratory issues. Appetite is fairly good. Ambulates without a device. Can be resistive at times. Requires assist of 1 with cares.     Allergies, and PMH/PSH reviewed in EPIC today    REVIEW OF SYSTEMS:  Unable to obtain due to dementia    Objective:   /60   Pulse 60   Temp 97.5  F (36.4  C)   Resp 18   Ht 1.575 m (5' 2\")   SpO2 95%   BMI 22.31 kg/m    GENERAL APPEARANCE:  Alert, in no distress   ENT:  [place], oropharynx clear  EYES:  conjunctiva and lids normal  NECK: no adenopathy or thyromegaly   RESP:  lungs clear to auscultation  CV:  regular rate and rhythm, no murmur, trace edema both LE  ABDOMEN:  soft, non-tender, no distension, no masses  M/S: gait steady. VLAENTINO with good strength. No joint inflammation  SKIN:  no visible rashes or open areas  PSYCH:  oriented to self, insight and judgement impaired, memory impaired , affect and mood normal    Recent labs in EPIC reviewed by " me today.     ASSESSMENT / PLAN:  (G30.1,  F02.818) Late onset Alzheimer's dementia with behavioral disturbance (H)  (primary encounter diagnosis)  (F22) Paranoia (H)  Comment: advanced disease with loss of verbal skills, low functional status. Behaviors are manageable    Plan: continue risperidone at HS and prn. Discontinue prn haldol and lorazepam-not used.  Memory Care staff assistance with cares,meals, activities, med admin.     (E46) Protein-calorie malnutrition, unspecified severity (H)  Comment: oral intake fairly good and she's maintaining her weight   Plan: staff supervision and assistance at meals. Follow weight     (I42.9) Cardiomyopathy, unspecified type (H)  (R60.0) Lower extremity edema  Comment: minimal edema on exam  EF 46% in 9/2019  Plan: continue lasix, metoprolol    (I47.1) SVT (supraventricular tachycardia) (H)  (I10) Essential hypertension  Comment: controlled with recent /60 HR 60  Plan: continue lasix,metoprolol. Avoid hypotension due to advanced age and fall risk         Electronically signed by: MARTY Uribe CNP

## 2022-10-10 NOTE — LETTER
"    10/10/2022        RE: Frances Alexander  C/o Frank Alexander  [address]  [facility] [address]        [facility] G[facility]IATR[facility]    Chief Complaint   Patient presents with     Nursing Home Acute     HPI:  Frances Alexander is a 87 year old  (1935), who is being seen today for an episodic care visit at: [facility] [[id number]].   She has lived in Memory Care at this facility since 10/2019. Medical history significant for dementia, HTN, cardiomyopathy, SVT, remote breast cancer, hypothyroidism.   She was receiving care with [facility] and discharged 6/8/2022 due to stability.   She tested positive for COVID-19 on 7/25/2022 and had a mild cough with no other symptoms.     Today's concern is:      Late onset Alzheimer's dementia with behavioral disturbance (H)  Paranoia (H)  Protein-calorie malnutrition, unspecified severity (H)  Cardiomyopathy, unspecified type (H)  Lower extremity edema  SVT (supraventricular tachycardia) (H)  Essential hypertension  She is unable to provide history.Speaks a few words, does not respond to questions. Staff reports no new issues. No cough or respiratory issues. Appetite is fairly good. Ambulates without a device. Can be resistive at times. Requires assist of 1 with cares.     Allergies, and PMH/PSH reviewed in EPIC today    REVIEW OF SYSTEMS:  Unable to obtain due to dementia    Objective:   /60   Pulse 60   Temp 97.5  F (36.4  C)   Resp 18   Ht 1.575 m (5' 2\")   SpO2 95%   BMI 22.31 kg/m    GENERAL APPEARANCE:  Alert, in no distress   ENT:  [place], oropharynx clear  EYES:  conjunctiva and lids normal  NECK: no adenopathy or thyromegaly   RESP:  lungs clear to auscultation  CV:  regular rate and rhythm, no murmur, trace edema both LE  ABDOMEN:  soft, non-tender, no distension, no masses  M/S: gait steady. VALENTINO with good strength. No joint inflammation  SKIN:  no visible rashes or open areas  PSYCH:  oriented to self, " insight and judgement impaired, memory impaired , affect and mood normal    Recent labs in [facility] reviewed by me today.     ASSESSMENT / PLAN:  (G30.1,  F02.818) Late onset Alzheimer's dementia with behavioral disturbance (H)  (primary encounter diagnosis)  (F22) Paranoia (H)  Comment: advanced disease with loss of verbal skills, low functional status. Behaviors are manageable    Plan: continue risperidone at HS and prn. Discontinue prn haldol and lorazepam-not used.  Memory Care staff assistance with cares,meals, activities, med admin.     (E46) Protein-calorie malnutrition, unspecified severity (H)  Comment: oral intake fairly good and she's maintaining her weight   Plan: staff supervision and assistance at meals. Follow weight     (I42.9) Cardiomyopathy, unspecified type (H)  (R60.0) Lower extremity edema  Comment: minimal edema on exam  EF 46% in 9/2019  Plan: continue lasix, metoprolol    (I47.1) SVT (supraventricular tachycardia) (H)  (I10) Essential hypertension  Comment: controlled with recent /60 HR 60  Plan: continue lasix,metoprolol. Avoid hypotension due to advanced age and fall risk         Electronically signed by: MARTY Uribe CNP           Sincerely,        MARTY Uribe CNP

## 2022-11-21 NOTE — PROGRESS NOTES
[facility] GERIATRICS    Chief Complaint   Patient presents with     RECHECK     HPI:  Frances Alexander is a 87 year old  (1935), who is being seen today for an episodic care visit at: [facility] [[id number]].   She has lived in Memory Care at this facility since 10/2019. Medical history significant for dementia, HTN, cardiomyopathy, SVT, remote breast cancer, hypothyroidism.   She was receiving care with [facility] and was discharged 6/8/2022 due to stability.   She tested positive for COVID-19 on 7/25/2022 and had a mild cough with no other symptoms.     Today's concern is:      Late onset Alzheimer's dementia with behavioral disturbance (H)  Paranoia (H)  Protein-calorie malnutrition, unspecified severity (H)  Cardiomyopathy, unspecified type (H)  Lower extremity edema  SVT (supraventricular tachycardia) (H)  Essential hypertension  She is unable to provide history. Alert, smiling and very talkative today, though conversation is nonsensical. We ambulated about the unit together, but she declined entering her apartment. Staff reports she is requiring more assistance with cares, including feeding at times. Staff has also noticed drooling, though this is not present today. Ambulates without a device. Requires assist of 1 with cares. Can be anxious and resistive at times.     Allergies, and PMH/PSH reviewed in EPIC today    REVIEW OF SYSTEMS:  Unable to obtain due to dementia. Positives as noted under HPI.       Objective:   /80   Pulse 117   Temp 97.5  F (36.4  C)   Resp 28   Wt 48.5 kg (107 lb)   BMI 19.57 kg/m    GENERAL APPEARANCE:  Alert, in no distress, thin   ENT:  [place], oropharynx clear  EYES:  conjunctiva and lids normal  NECK: no adenopathy or thyromegaly   RESP:  lungs clear to auscultation  CV:  regular rate and rhythm, no murmur, 1+  edema both LE  ABDOMEN:  soft, non-tender, no distension, no masses  M/S: gait steady. VALENTINO with good  strength. No joint inflammation  SKIN:  no visible rashes or open areas  PSYCH:  oriented to self, insight and judgement impaired, memory impaired , affect and mood normal    Recent labs in EPIC reviewed by me today.     ASSESSMENT / PLAN:  (G30.1,  F02.818) Late onset Alzheimer's dementia with behavioral disturbance (H)  (primary encounter diagnosis)  (F22) Paranoia (H)  Comment: advanced disease with loss of language and low functional status. See below re: weight loss.   Plan: continue low dose seroquel. Memory Care staff assistance with cares, meals, activities, med admin  Discussed with her son Frank Alexander, who is considering moving her to a different facility due to cost. We discussed the progressive nature of dementia, weight loss and declining functional status. He confirms goals of care are comfort focused, avoid hospitalization, hospice when appropriate.     (E46) Protein-calorie malnutrition, unspecified severity (H)  Comment: requiring more assistance during meals. Her weight has ranged from 108-122 lbs over the past several months, usual weight ~115 lbs. Most recent weight 107 lbs   Plan: refer to hospice if weight loss continues. Change diet to cut up food with thin liquids. Staff assistance with meals.     (I42.9) Cardiomyopathy, unspecified type (H)  (R60.0) Lower extremity edema  Comment: no chest pain or dyspnea.   EF 46% in 9/2019   Plan: continue lasix, metoprolol. Follow weight,symptoms. BMP     (I47.1) SVT (supraventricular tachycardia) (H)  (I10) Essential hypertension  Comment: controlled with BPs: 110/80, 125/60, 135/60. Mild tachycardia with recent    Plan: continue lasix, metoprolol. BMP. Avoid hypotension due to advanced age and fall risk         Electronically signed by: MARTY Uribe CNP

## 2022-11-21 NOTE — LETTER
11/21/2022        RE: Frances Alexander  C/o Frank Alexander  [address]  [facility] [address]        [facility] GERIATRICS    Chief Complaint   Patient presents with     RECHECK     HPI:  Frances Alexander is a 87 year old  (1935), who is being seen today for an episodic care visit at: [facility] [[id number]].   She has lived in Memory Care at this facility since 10/2019. Medical history significant for dementia, HTN, cardiomyopathy, SVT, remote breast cancer, hypothyroidism.   She was receiving care with [facility] and was discharged 6/8/2022 due to stability.   She tested positive for COVID-19 on 7/25/2022 and had a mild cough with no other symptoms.     Today's concern is:      Late onset Alzheimer's dementia with behavioral disturbance (H)  Paranoia (H)  Protein-calorie malnutrition, unspecified severity (H)  Cardiomyopathy, unspecified type (H)  Lower extremity edema  SVT (supraventricular tachycardia) (H)  Essential hypertension  She is unable to provide history. Alert, smiling and very talkative today, though conversation is nonsensical. We ambulated about the unit together, but she declined entering her apartment. Staff reports she is requiring more assistance with cares, including feeding at times. Staff has also noticed drooling, though this is not present today. Ambulates without a device. Requires assist of 1 with cares. Can be anxious and resistive at times.     Allergies, and PMH/PSH reviewed in EPIC today    REVIEW OF SYSTEMS:  Unable to obtain due to dementia. Positives as noted under HPI.       Objective:   /80   Pulse 117   Temp 97.5  F (36.4  C)   Resp 28   Wt 48.5 kg (107 lb)   BMI 19.57 kg/m    GENERAL APPEARANCE:  Alert, in no distress, thin   ENT:  [place], oropharynx clear  EYES:  conjunctiva and lids normal  NECK: no adenopathy or thyromegaly   RESP:  lungs clear to auscultation  CV:  regular rate and rhythm, no murmur, 1+   edema both LE  ABDOMEN:  soft, non-tender, no distension, no masses  M/S: gait steady. VALENTINO with good strength. No joint inflammation  SKIN:  no visible rashes or open areas  PSYCH:  oriented to self, insight and judgement impaired, memory impaired , affect and mood normal    Recent labs in [facility] reviewed by me today.     ASSESSMENT / PLAN:  (G30.1,  F02.818) Late onset Alzheimer's dementia with behavioral disturbance (H)  (primary encounter diagnosis)  (F22) Paranoia (H)  Comment: advanced disease with loss of language and low functional status. See below re: weight loss.   Plan: continue low dose seroquel. Memory Care staff assistance with cares, meals, activities, med admin  Discussed with her son Frnak Alexander, who is considering moving her to a different facility due to cost. We discussed the progressive nature of dementia, weight loss and declining functional status. He confirms goals of care are comfort focused, avoid hospitalization, hospice when appropriate.     (E46) Protein-calorie malnutrition, unspecified severity (H)  Comment: requiring more assistance during meals. Her weight has ranged from 108-122 lbs over the past several months, usual weight ~115 lbs. Most recent weight 107 lbs   Plan: refer to hospice if weight loss continues. Change diet to cut up food with thin liquids. Staff assistance with meals.     (I42.9) Cardiomyopathy, unspecified type (H)  (R60.0) Lower extremity edema  Comment: no chest pain or dyspnea.   EF 46% in 9/2019   Plan: continue lasix, metoprolol. Follow weight,symptoms. BMP     (I47.1) SVT (supraventricular tachycardia) (H)  (I10) Essential hypertension  Comment: controlled with BPs: 110/80, 125/60, 135/60. Mild tachycardia with recent    Plan: continue lasix, metoprolol. BMP. Avoid hypotension due to advanced age and fall risk         Electronically signed by: MARTY Uribe CNP             Sincerely,        MARTY Uribe CNP

## 2022-12-12 NOTE — ED TRIAGE NOTES
Patient took an unwitnessed fall in dinning area of the memory care unit, staff found patient on the floor with skin abrasion of over nose with bruise, slight shifting of the nose to left side. Staff reported patient is at baseline mentation since fall. No other injuries seen. Not on blood thinners.      Triage Assessment       Row Name 12/12/22 0944       Triage Assessment (Adult)    Airway WDL WDL       Respiratory WDL    Respiratory WDL WDL       Skin Circulation/Temperature WDL    Skin Circulation/Temperature WDL X       Cardiac WDL    Cardiac WDL WDL       Peripheral/Neurovascular WDL    Peripheral Neurovascular WDL WDL       Cognitive/Neuro/Behavioral WDL    Cognitive/Neuro/Behavioral WDL WDL

## 2022-12-12 NOTE — ED PROVIDER NOTES
History   Chief Complaint:  Fall       History limited by: Dementia.      Frances Alexander is a 87 year old female with history of dementia who presents after a fall. The patient presents via EMS from [facility] where she suffered an unwitnessed fall in a dining area. The staff at the facility found her on the floor with a skin abrasion and bruising to her nose. They also noted that her nose appeared slightly shifted to the left. They state that her mentation is at baseline.    Review of Systems   Unable to perform ROS: Dementia     Allergies:  No known drug allergies    Medications:  Bisacodyl  Lasix  Synthroid  Toprol-XL  Risperidone  Senokot    Past Medical History:     Protein-calorie malnutrition  Vitamin D deficiency  Hypothyroidism  Irregular heart beat  Nonsustained ventricular tachycardia  Cardiomyopathy  Hypertension  Dementia     Past Surgical History:    Mastectomy, right     Social History:  The patient presents to the ED alone. She arrived via EMS.    Physical Exam     Patient Vitals for the past 24 hrs:   BP Temp Temp src Pulse Resp SpO2   12/12/22 0924 (!) 148/100 98.5  F (36.9  C) Oral 71 16 94 %       Physical Exam  Vital signs reviewed.  Nursing note reviewed.  Constitutional: Well-developed, Well-nourished.  Non-diaphoretic.  Mild distress    HENT: Abrasion to nasal bridge w/o bony crepitance or step-offs.  No pain or instability to palpation of bony orbits, mandible, maxilla.  Good jaw opening.  No malocclusion.  No nasal septal hematoma.  OP clear and moist.    EYES:  PERRL.  EOMI.  NECK:  No Cspine tenderness.  Trachea midline.  Full ROM.  Supple. No stridor.  CARDIAC:  RRR. 2+ bilat radial pulses   CHEST:  Chest NT  PULM: Effort  Normal.  Breath sounds clear and equal bilat  ABD:  Soft, NT/ND  No guarding, no rebound.    BACK:  No T or L spinous process tenderness.  Pelvis stable.  EXT:  Full ROM X4.  No tenderness, edema, crepitus or obvious deformity  NEURO:  Alert, disoriented (baseline  dementia).  Moves ext x4 .  Sensation intact to LT.   SKIN :  Warm.  Dry.   No erythema.  No rash  PSYCH.:  Normal judgment.  Normal affect.      Emergency Department Course     Imaging:  CT Cervical Spine w/o Contrast   Final Result   IMPRESSION:    1. Focal irregularity involving the anterior superior edge of the C5   vertebral body. Subtle age indeterminate fracture cannot be excluded.   Prevertebral edema would be typically expected if this was acute,   however prevertebral edema is not present.   2. Otherwise, no evidence of acute fracture posttraumatic subluxation.      JONAS SANABRIA MD            SYSTEM ID:  [id number]      CT Head w/o Contrast   Final Result   IMPRESSION:    1. No acute intracranial abnormality.   2. Nasal bone fracture, age indeterminate.   3. Small metallic foreign body within the left orbit.      JONAS SANABRIA MD            SYSTEM ID:  [id number]        Report per radiology    Emergency Department Course:       Reviewed:  I reviewed nursing notes, vitals, past medical history and Care Everywhere    Assessments:  0934 I obtained history and examined the patient as noted above.   1038 I rechecked the patient and explained findings.     Disposition:  The patient was discharged to home.     Impression & Plan     Medical Decision Makin year old female presenting w/ facial injury s/p mechanical fall     DDx includes mechanical fall, fracture, contusion, intracranial hemorrhage, skull fracture.  Doubt seizure, syncope, CVA given history and physical exam.  No other evidence of trauma on full primary and secondary trauma surveys other than areas imaged above or documented in physical exam.  Given mechanical fall and no other complaints, EKG and blood work deferred. Imaging sig for likely incidental finding of foreign body in left orbit given no evidence of orbital trauma on physical exam, likely incidental finding of age-indeterminate fracture on CT C-spine given no associated edema and no  significant tenderness or decreased range of motion on exam.  Interventions as noted above.  At this time I feel the pt is safe for discharge.  Written recommendations given regarding follow up with PCP and return to the emergency department as needed for new or worsening symptoms.  Written results, disposition and diagnosis provided.  They are understanding and agreeable to plan. Patient discharged in stable condition.       Diagnosis:    ICD-10-CM    1. Closed fracture of nasal bone, initial encounter  S02.2XXA       2. Foreign body of left orbit, initial encounter  S05.42XA       3. Fall, initial encounter  W19.XXXA       4. Dementia without behavioral disturbance, psychotic disturbance, mood disturbance, or anxiety, unspecified dementia severity, unspecified dementia type (H)  F03.90       5. Abrasion of nose, initial encounter  S00.31XA           Discharge Medications:  New Prescriptions    No medications on file       Scribe Disclosure:  [place] HI, am serving as a scribe at 9:34 AM on 12/12/2022 to document services personally performed by Jae Helm MD based on my observations and the provider's statements to me.        Jae Helm MD  12/12/22 1153       Jae Helm MD  12/12/22 1662

## 2022-12-12 NOTE — ED NOTES
Nursing facility called back and left a message on voice mail for nurse to call back for updates and patients return. Franky Marie also updated on patients condition.

## 2022-12-12 NOTE — DISCHARGE INSTRUCTIONS
The nasal bone fracture will heal on its own.  Ms. Alexander may follow-up in ENT clinic as needed if there is significant cosmetic deformity after the swelling improves.    Please return to the emergency department as needed for new or worsening symptoms including repeated falls, difficulty breathing, vomiting unable to keep anything down, any other concerning symptoms.     -Take acetaminophen 500 to 1000 mg by mouth every 4 to 6 hours as needed for pain or fever.  Do not take more than 4000 mg in 24 hours.  Do not take within 6 hours of another acetaminophen containing medication such as norco (vicodin) or percocet.

## 2022-12-12 NOTE — ED NOTES
Bed: ED23  Expected date: 12/12/22  Expected time: 9:00 AM  Means of arrival:   Comments:  H418: 87F- fall poss nose fracture

## 2022-12-19 NOTE — LETTER
12/19/2022        RE: Frances Alexander  C/o Frank Alexander  [address]  [facility] [address]        [facility] GERIATRICS    Chief Complaint   Patient presents with     RECHECK     HPI:  Frances Alexander is a 87 year old  (1935), who is being seen today for an episodic care visit at: [facility] ([facility]) [[id number]].   She has lived in Memory Care at this facility since 10/2019. Medical history significant for dementia, HTN, cardiomyopathy, SVT, remote breast cancer, hypothyroidism.   She was receiving care with [facility] and was discharged 6/8/2022 due to stability.   She tested positive for COVID-19 on 7/25/2022 and had a mild cough with no other symptoms.     She was sent to the ED 12/12/2022 after an unwitnessed fall with facial trauma and epistaxis. CT head showed a nasal bone fracture, age indeterminate, small metallic foreign body in the left orbit along the anterior lens,  no acute intracranial pathology. She had no evidence of orbital trauma . CT cervical spine showed a focal irregularity of C5 without prevertebral edema, no evidence of acute fracture. She returned to the facility. Tylenol was scheduled for pain.       Today's concern is:      Closed fracture of nasal bone with routine healing, subsequent encounter  Late onset Alzheimer's dementia with behavioral disturbance (H)  Paranoia (H)  Chronic kidney disease, stage 3a (H)  Protein-calorie malnutrition, unspecified severity (H)  Cardiomyopathy, unspecified type (H)  Lower extremity edema  SVT (supraventricular tachycardia) (H)  Essential hypertension  Hypothyroidism, unspecified type  She is up in the dining room, feeding herself without difficulty and just finishing breakfast. Pleasant and smiling. Conversation is nonsensical. Ambulates without a device. Requires assist of 1 with cares. She can be resistive and agitated at times, but no significant issues. Staff reports no other issues.      Allergies, and PMH/PSH reviewed in EPIC today    REVIEW OF SYSTEMS:  Unable to obtain due to dementia. Positives as noted under HPI.       Objective:   BP (!) 148/100   Pulse 71   Temp 98.5  F (36.9  C)   Resp 16   Wt 56.2 kg (124 lb)   BMI 22.68 kg/m    GENERAL APPEARANCE:  Alert, in no distress   ENT:  [place], oropharynx clear  EYES:  conjunctiva and lids normal  NECK: no adenopathy or thyromegaly   RESP:  lungs clear to auscultation  CV:  regular rate and rhythm, no murmur, trace edema both LE  ABDOMEN:  soft, non-tender, no distension, no masses  M/S: up in chair. VALENTINO with good strength. No joint inflammation  SKIN:  fading facial bruising and small abrasion over the bridge of her nose, skin intact. No rashes   PSYCH:  oriented to self, insight and judgement impaired, memory impaired , affect and mood normal    Recent labs in [facility] reviewed by me today.     ASSESSMENT / PLAN:  (S02.2XXD) Closed fracture of nasal bone with routine healing, subsequent encounter  (primary encounter diagnosis)  Comment: age indeterminate on imaging. She appears comfortable, no difficulty breathing through her nose   Plan: continue tylenol  Discussed with her son Frank Alexander     (G30.1,  F02.818) Late onset Alzheimer's dementia with behavioral disturbance (H)  (F22) Paranoia (H)  Comment: severe deficits, low functional status and loss of verbal skills. Behavior manageable   Plan: continue seroquel. Memory Care staff assistance with cares, meals, activities, med admin      (N18.31) Chronic kidney disease, stage 3a (H)  Comment: creatinine 1.00 with GFR 54  Plan: avoid nephrotoxins. Renal dose meds     (E46) Protein-calorie malnutrition, unspecified severity (H)  Comment: weight improved at 124 lbs. The previous weight of 107 lbs may have been incorrect   Plan: continue cut up diet with thin liquids, staff assistance at meals. Follow weight     (I42.9) Cardiomyopathy, unspecified type (H)  (R60.0) Lower extremity edema  Comment:  minimal edema on exam. Appears euvolemic   EF 46% in 9/2019  Plan: continue lasix, metoprolol. Follow weight.     (I47.1) SVT (supraventricular tachycardia) (H)  (I10) Essential hypertension  Comment: acceptable control with /93, 135/60  HR: 71-79  Plan: continue lasix, metoprolol. Avoid hypotension due to advanced age and fall risk     (E03.9) Hypothyroidism, unspecified type  Comment: TSH 2.70 on 11/22/2022   Plan: continue levothyroxine 75 mcg daily       ADDENDUM 12/20/2022  AL nurse called reporting patient had another fall without injury. They are requesting evaluation by hospice. Unclear if she meets criteria, but will ask [facility] to assess.           Electronically signed by: MARTY Uribe CNP             Sincerely,        MARTY Uribe CNP

## 2022-12-19 NOTE — PROGRESS NOTES
[facility]IATRICS    Chief Complaint   Patient presents with     RECHECK     HPI:  Frances Alexander is a 87 year old  (1935), who is being seen today for an episodic care visit at: [facility] [[id number]].   She has lived in Memory Care at this facility since 10/2019. Medical history significant for dementia, HTN, cardiomyopathy, SVT, remote breast cancer, hypothyroidism.   She was receiving care with [facility] and was discharged 6/8/2022 due to stability.   She tested positive for COVID-19 on 7/25/2022 and had a mild cough with no other symptoms.     She was sent to the ED 12/12/2022 after an unwitnessed fall with facial trauma and epistaxis. CT head showed a nasal bone fracture, age indeterminate, small metallic foreign body in the left orbit along the anterior lens,  no acute intracranial pathology. She had no evidence of orbital trauma . CT cervical spine showed a focal irregularity of C5 without prevertebral edema, no evidence of acute fracture. She returned to the facility. Tylenol was scheduled for pain.       Today's concern is:      Closed fracture of nasal bone with routine healing, subsequent encounter  Late onset Alzheimer's dementia with behavioral disturbance (H)  Paranoia (H)  Chronic kidney disease, stage 3a (H)  Protein-calorie malnutrition, unspecified severity (H)  Cardiomyopathy, unspecified type (H)  Lower extremity edema  SVT (supraventricular tachycardia) (H)  Essential hypertension  Hypothyroidism, unspecified type  She is up in the dining room, feeding herself without difficulty and just finishing breakfast. Pleasant and smiling. Conversation is nonsensical. Ambulates without a device. Requires assist of 1 with cares. She can be resistive and agitated at times, but no significant issues. Staff reports no other issues.     Allergies, and PMH/PSH reviewed in EPIC today    REVIEW OF SYSTEMS:  Unable to obtain due to dementia.  Positives as noted under HPI.       Objective:   BP (!) 148/100   Pulse 71   Temp 98.5  F (36.9  C)   Resp 16   Wt 56.2 kg (124 lb)   BMI 22.68 kg/m    GENERAL APPEARANCE:  Alert, in no distress   ENT:  [place], oropharynx clear  EYES:  conjunctiva and lids normal  NECK: no adenopathy or thyromegaly   RESP:  lungs clear to auscultation  CV:  regular rate and rhythm, no murmur, trace edema both LE  ABDOMEN:  soft, non-tender, no distension, no masses  M/S: up in chair. VALENTINO with good strength. No joint inflammation  SKIN:  fading facial bruising and small abrasion over the bridge of her nose, skin intact. No rashes   PSYCH:  oriented to self, insight and judgement impaired, memory impaired , affect and mood normal    Recent labs in [facility] reviewed by me today.     ASSESSMENT / PLAN:  (S02.2XXD) Closed fracture of nasal bone with routine healing, subsequent encounter  (primary encounter diagnosis)  Comment: age indeterminate on imaging. She appears comfortable, no difficulty breathing through her nose   Plan: continue tylenol  Discussed with her son Frank Alexander     (G30.1,  F02.818) Late onset Alzheimer's dementia with behavioral disturbance (H)  (F22) Paranoia (H)  Comment: severe deficits, low functional status and loss of verbal skills. Behavior manageable   Plan: continue seroquel. Memory Care staff assistance with cares, meals, activities, med admin      (N18.31) Chronic kidney disease, stage 3a (H)  Comment: creatinine 1.00 with GFR 54  Plan: avoid nephrotoxins. Renal dose meds     (E46) Protein-calorie malnutrition, unspecified severity (H)  Comment: weight improved at 124 lbs. The previous weight of 107 lbs may have been incorrect   Plan: continue cut up diet with thin liquids, staff assistance at meals. Follow weight     (I42.9) Cardiomyopathy, unspecified type (H)  (R60.0) Lower extremity edema  Comment: minimal edema on exam. Appears euvolemic   EF 46% in 9/2019  Plan: continue lasix, metoprolol. Follow  weight.     (I47.1) SVT (supraventricular tachycardia) (H)  (I10) Essential hypertension  Comment: acceptable control with /93, 135/60  HR: 71-79  Plan: continue lasix, metoprolol. Avoid hypotension due to advanced age and fall risk     (E03.9) Hypothyroidism, unspecified type  Comment: TSH 2.70 on 11/22/2022   Plan: continue levothyroxine 75 mcg daily       ADDENDUM 12/20/2022  AL nurse called reporting patient had another fall without injury. They are requesting evaluation by hospice. Unclear if she meets criteria, but will ask [facility] to assess.           Electronically signed by: MARTY Uribe CNP

## 2022-12-20 PROBLEM — N18.31 CHRONIC KIDNEY DISEASE, STAGE 3A (H): Status: ACTIVE | Noted: 2022-01-01

## 2023-01-01 ENCOUNTER — ASSISTED LIVING VISIT (OUTPATIENT)
Dept: GERIATRICS | Facility: CLINIC | Age: 88
End: 2023-01-01
Payer: COMMERCIAL

## 2023-01-01 ENCOUNTER — TELEPHONE (OUTPATIENT)
Dept: GERIATRICS | Facility: CLINIC | Age: 88
End: 2023-01-01
Payer: MEDICARE

## 2023-01-01 ENCOUNTER — ASSISTED LIVING VISIT (OUTPATIENT)
Dept: GERIATRICS | Facility: CLINIC | Age: 88
End: 2023-01-01
Payer: MEDICARE

## 2023-01-01 ENCOUNTER — LAB REQUISITION (OUTPATIENT)
Dept: LAB | Facility: CLINIC | Age: 88
End: 2023-01-01
Payer: COMMERCIAL

## 2023-01-01 VITALS
HEART RATE: 88 BPM | DIASTOLIC BLOOD PRESSURE: 74 MMHG | TEMPERATURE: 98.4 F | WEIGHT: 100.6 LBS | SYSTOLIC BLOOD PRESSURE: 145 MMHG | RESPIRATION RATE: 20 BRPM | BODY MASS INDEX: 18.4 KG/M2

## 2023-01-01 VITALS
TEMPERATURE: 98.3 F | DIASTOLIC BLOOD PRESSURE: 82 MMHG | BODY MASS INDEX: 18.66 KG/M2 | HEART RATE: 94 BPM | SYSTOLIC BLOOD PRESSURE: 130 MMHG | RESPIRATION RATE: 16 BRPM | WEIGHT: 102 LBS

## 2023-01-01 VITALS
HEART RATE: 77 BPM | WEIGHT: 122 LBS | TEMPERATURE: 96.7 F | BODY MASS INDEX: 22.45 KG/M2 | OXYGEN SATURATION: 91 % | DIASTOLIC BLOOD PRESSURE: 104 MMHG | RESPIRATION RATE: 20 BRPM | HEIGHT: 62 IN | SYSTOLIC BLOOD PRESSURE: 137 MMHG

## 2023-01-01 DIAGNOSIS — R63.4 WEIGHT LOSS: ICD-10-CM

## 2023-01-01 DIAGNOSIS — S02.2XXD CLOSED FRACTURE OF NASAL BONE WITH ROUTINE HEALING, SUBSEQUENT ENCOUNTER: ICD-10-CM

## 2023-01-01 DIAGNOSIS — I42.9 CARDIOMYOPATHY, UNSPECIFIED TYPE (H): ICD-10-CM

## 2023-01-01 DIAGNOSIS — G30.1 LATE ONSET ALZHEIMER'S DEMENTIA WITH BEHAVIORAL DISTURBANCE (H): ICD-10-CM

## 2023-01-01 DIAGNOSIS — R60.9 EDEMA, UNSPECIFIED: ICD-10-CM

## 2023-01-01 DIAGNOSIS — N18.31 CHRONIC KIDNEY DISEASE, STAGE 3A (H): ICD-10-CM

## 2023-01-01 DIAGNOSIS — F02.818 LATE ONSET ALZHEIMER'S DEMENTIA WITH BEHAVIORAL DISTURBANCE (H): ICD-10-CM

## 2023-01-01 DIAGNOSIS — K59.00 CONSTIPATION: Primary | ICD-10-CM

## 2023-01-01 DIAGNOSIS — R60.0 LOWER EXTREMITY EDEMA: ICD-10-CM

## 2023-01-01 DIAGNOSIS — E46 PROTEIN-CALORIE MALNUTRITION, UNSPECIFIED SEVERITY (H): ICD-10-CM

## 2023-01-01 DIAGNOSIS — U07.1 COVID-19: ICD-10-CM

## 2023-01-01 DIAGNOSIS — Z51.5 HOSPICE CARE PATIENT: ICD-10-CM

## 2023-01-01 DIAGNOSIS — I47.10 SVT (SUPRAVENTRICULAR TACHYCARDIA) (H): ICD-10-CM

## 2023-01-01 DIAGNOSIS — F22 PARANOIA (H): ICD-10-CM

## 2023-01-01 DIAGNOSIS — S72.001A CLOSED FRACTURE OF NECK OF RIGHT FEMUR, INITIAL ENCOUNTER (H): Primary | ICD-10-CM

## 2023-01-01 DIAGNOSIS — I10 ESSENTIAL HYPERTENSION: ICD-10-CM

## 2023-01-01 DIAGNOSIS — R29.6 FALLS FREQUENTLY: ICD-10-CM

## 2023-01-01 DIAGNOSIS — G30.1 LATE ONSET ALZHEIMER'S DEMENTIA WITH BEHAVIORAL DISTURBANCE (H): Primary | ICD-10-CM

## 2023-01-01 DIAGNOSIS — S72.001D CLOSED FRACTURE OF NECK OF RIGHT FEMUR WITH ROUTINE HEALING, SUBSEQUENT ENCOUNTER: Primary | ICD-10-CM

## 2023-01-01 DIAGNOSIS — F02.818 LATE ONSET ALZHEIMER'S DEMENTIA WITH BEHAVIORAL DISTURBANCE (H): Primary | ICD-10-CM

## 2023-01-01 DIAGNOSIS — I47.29 NONSUSTAINED VENTRICULAR TACHYCARDIA (H): ICD-10-CM

## 2023-01-01 LAB
ANION GAP SERPL CALCULATED.3IONS-SCNC: 11 MMOL/L (ref 7–15)
BUN SERPL-MCNC: 15.4 MG/DL (ref 8–23)
CALCIUM SERPL-MCNC: 9.4 MG/DL (ref 8.8–10.2)
CHLORIDE SERPL-SCNC: 105 MMOL/L (ref 98–107)
CREAT SERPL-MCNC: 0.84 MG/DL (ref 0.51–0.95)
DEPRECATED HCO3 PLAS-SCNC: 27 MMOL/L (ref 22–29)
GFR SERPL CREATININE-BSD FRML MDRD: 67 ML/MIN/1.73M2
GLUCOSE SERPL-MCNC: 103 MG/DL (ref 70–99)
POTASSIUM SERPL-SCNC: 4.2 MMOL/L (ref 3.4–5.3)
SARS-COV-2 RNA RESP QL NAA+PROBE: NEGATIVE
SODIUM SERPL-SCNC: 143 MMOL/L (ref 136–145)

## 2023-01-01 PROCEDURE — U0003 INFECTIOUS AGENT DETECTION BY NUCLEIC ACID (DNA OR RNA); SEVERE ACUTE RESPIRATORY SYNDROME CORONAVIRUS 2 (SARS-COV-2) (CORONAVIRUS DISEASE [COVID-19]), AMPLIFIED PROBE TECHNIQUE, MAKING USE OF HIGH THROUGHPUT TECHNOLOGIES AS DESCRIBED BY CMS-2020-01-R: HCPCS | Mod: ORL | Performed by: NURSE PRACTITIONER

## 2023-01-01 PROCEDURE — 80048 BASIC METABOLIC PNL TOTAL CA: CPT | Mod: ORL | Performed by: NURSE PRACTITIONER

## 2023-01-01 PROCEDURE — P9603 ONE-WAY ALLOW PRORATED MILES: HCPCS | Mod: ORL | Performed by: NURSE PRACTITIONER

## 2023-01-01 PROCEDURE — 99350 HOME/RES VST EST HIGH MDM 60: CPT | Performed by: NURSE PRACTITIONER

## 2023-01-01 PROCEDURE — 99349 HOME/RES VST EST MOD MDM 40: CPT | Mod: GV | Performed by: NURSE PRACTITIONER

## 2023-01-01 PROCEDURE — 99349 HOME/RES VST EST MOD MDM 40: CPT | Performed by: INTERNAL MEDICINE

## 2023-01-01 PROCEDURE — 36415 COLL VENOUS BLD VENIPUNCTURE: CPT | Mod: ORL | Performed by: NURSE PRACTITIONER

## 2023-01-01 RX ORDER — SENNOSIDES 8.6 MG/1
TABLET, COATED ORAL
Qty: 30 TABLET | Refills: 10 | Status: SHIPPED | OUTPATIENT
Start: 2023-01-01

## 2023-01-01 RX ORDER — MORPHINE SULFATE 30 MG/1
2.5 TABLET ORAL
COMMUNITY

## 2023-01-01 RX ORDER — HALOPERIDOL 0.5 MG/1
0.5 TABLET ORAL EVERY 6 HOURS PRN
COMMUNITY

## 2023-01-01 RX ORDER — ACETAMINOPHEN 325 MG/1
TABLET ORAL
Qty: 120 TABLET | Refills: 11 | Status: SHIPPED | OUTPATIENT
Start: 2023-01-01

## 2023-01-01 RX ORDER — METOPROLOL SUCCINATE 25 MG/1
TABLET, EXTENDED RELEASE ORAL
Qty: 31 TABLET | Refills: 11 | Status: SHIPPED | OUTPATIENT
Start: 2023-01-01

## 2023-01-01 RX ORDER — LORAZEPAM 0.5 MG/1
0.5 TABLET ORAL EVERY 4 HOURS PRN
COMMUNITY

## 2023-01-25 NOTE — PROGRESS NOTES
"Frances Alexander is a 87 year old female seen January 25, 2023 at [facility] Memory Care unit where she has resided for 3 years (admit 10/2019) seen to follow up dementia, HTN and hypothyroidism.   Pt is seen on the unit up to chair holding a stuffed dog, state \"I like this little dog, he's looking up.\"   When asked how she is doing responds, \"I'm very direct.\"        By chart review, patient was living independently in [place], but family noticed cognitive decline with weight loss and moved her to [place] in August 2019, to an AL in [place].   She did not do well there, and moved to Memory Care for additional support.  She eventually settled after a couple of elopement attempts and concerns about staff coming into her apartment for med administration.    Pt was seen by Cardiology for palpitations, with Ziopatch revealing >800 short runs of SVT and one 5 beat run of VT.    She was started on metoprolol.  She was also noted to have mild cardiomyopathy of unclear etiology, EF 46%, mild -mod TR and mild pulmonary HTN  In April 2021 pt signed on to Hospice with dx of protein-calorie malnutrition, delusional disorder and dementia.   She was recertified in November 2021, then \"graduated\" in June 2022     Pt was seen in the ED in December 2022 following a fall with facial trauma and nose bleed.   Also had a fall last month.      Past Medical History:   Diagnosis Date     Cardiomyopathy (H)     mild, EF 46%      Essential hypertension      Hypothyroidism 8/30/2019     Irregular heart beat 8/30/2019     NSVT (nonsustained ventricular tachycardia) (H)      Vitamin D deficiency 8/30/2019   Breast cancer, s/p Right mastectomy    SH:  , lives AL Memory Care unit.  Moved to [place] from [place]   She has a son Frank, daughter-in-law Lisa and 2 grandchildren that live in [place].     Her daughter Sherrell and a granddaughter live in [place]    She also has a sister Kajal.   Non smoker    ROS:  SLUMS 18/30   " ">>>>now 5/30    Hearing loss  Weight in 2020 was 101 lbs >>>in 2021 was 93 lbs  Wt Readings from Last 5 Encounters:   01/24/23 55.3 kg (122 lb)   12/19/22 56.2 kg (124 lb)   11/21/22 48.5 kg (107 lb)   07/10/22 55.3 kg (122 lb)   05/23/22 48.1 kg (106 lb)      EXAM:  NAD  BP (!) 137/104   Pulse 77   Temp (!) 96.7  F (35.9  C)   Resp 20   Ht 1.575 m (5' 2\")   Wt 55.3 kg (122 lb)   SpO2 91%   BMI 22.31 kg/m     Neck supple without adenopathy  S/p right mastectomy  Lungs with decreased BS, no rales or wheeze  Heart irreg irreg s1s2  Abd soft, NT, no distention or guarding, +BS  Ext without edema  Neuro: ambulatory without device, disoriented, +STML, confabulatory  Psych: affect okay, cheerful    Last Comprehensive Metabolic Panel:  Sodium   Date Value Ref Range Status   11/22/2022 143 136 - 145 mmol/L Final     Potassium   Date Value Ref Range Status   11/22/2022 4.0 3.4 - 5.3 mmol/L Final     Carbon Dioxide (CO2)   Date Value Ref Range Status   11/22/2022 20 (L) 22 - 29 mmol/L Final     Glucose   Date Value Ref Range Status   11/22/2022 114 (H) 70 - 99 mg/dL Final     Urea Nitrogen   Date Value Ref Range Status   11/22/2022 22.2 8.0 - 23.0 mg/dL Final     Creatinine   Date Value Ref Range Status   11/22/2022 1.00 (H) 0.51 - 0.95 mg/dL Final     GFR Estimate   Date Value Ref Range Status   11/22/2022 54 (L) >60 mL/min/1.73m2 Final     Calcium   Date Value Ref Range Status   11/22/2022 9.7 8.8 - 10.2 mg/dL Final     Lab Results   Component Value Date    WBC 6.6 11/22/2022      HGB 13.9 11/22/2022      MCV 98 11/22/2022       11/22/2022      CT SCAN OF THE HEAD WITHOUT CONTRAST December 12, 2022   HISTORY: Fall, trauma.  FINDINGS: No evidence of hemorrhage. Volume loss and confluent white  matter hypoattenuation which likely represents advanced chronic small  vessel ischemic change. Probable nasal bone fracture, age  indeterminate. Small metallic foreign body within the left orbit along  the anterior lens. " Changes of bilateral cataract surgery.                                                             IMPRESSION:   1. No acute intracranial abnormality.  2. Nasal bone fracture, age indeterminate.  3. Small metallic foreign body within the left orbit.    9/27/19 Lexiscan     Impression:  1.  Myocardial perfusion imaging using single isotope technique  demonstrated normal tracer uptake at rest and stress without evidence for ischemia or infarction.   2.  Gated images demonstrated mild global hypokinesis.  The left  ventricular systolic function is mildly reduced ejection fraction of 46%.      IMP/PLAN:   (I42.9) Cardiomyopathy, unspecified type (H)  (I10) Benign essential hypertension   (N18.31) Chronic kidney disease, stage 3a (H)  Comment: GFR 54   No CV symptoms   BP Readings from Last 3 Encounters:   01/24/23 (!) 137/104   12/19/22 (!) 148/100   12/12/22 (!) 148/100      Pulse Readings from Last 4 Encounters:   01/24/23 77   12/19/22 71   12/12/22 71   11/21/22 117      Plan: Remains on metoprolol ER 25 mg/day which she also needs for VR control.  With diastolic HTN could consider increasing dose to 50 mg/day   Furosemide 20 mg/day   Follow BPs and BMP      (E44.0) Moderate protein-calorie malnutrition (H)  Comment: has regained >20 lbs   Plan: preferences, assist with meals       (E03.9) Hypothyroidism, unspecified type  Comment:   TSH   Date Value Ref Range Status   11/22/2022 2.70 0.30 - 4.20 uIU/mL Final   06/09/2021 2.66 0.30 - 5.00 uIU/mL Final   03/16/2021 0.97 0.30 - 5.00 uIU/mL Final      Plan: levothyroxine 75 mcg/day; yearly TSH     (I47.1) SVT (supraventricular tachycardia) (H)  Comment: >800 runs on Ziopatch reading   Plan: metoprolol ER 25 mg/day       (G30.1,  F02.81) Late onset Alzheimer's dementia with behavioral disturbance (H)  Comment: confusion and disorientation, low functional status with preserved language and mobility  Plan: AL Memory Care unit for assist with meds, meals, activity; secure  unit for safety    (F22) Paranoia (H)  Comment: and delusional thinking, sometimes interferes with cares   Plan: risperidone 0.5 mg/HS and prn     Advanced directive: pt is DNR/DNI per signed POLST.  Consider re-evaluation by Hospice if she has a downturn.       Alyssa Sandra MD

## 2023-01-25 NOTE — LETTER
"    1/25/2023        RE: Frances Alexander  C/o Frank Alexander  [address]  [facility] [id number]        Frances Alexander is a 87 year old female seen January 25, 2023 at [facility] Memory Care unit where she has resided for 3 years (admit 10/2019) seen to follow up dementia, HTN and hypothyroidism.   Pt is seen on the unit up to chair holding a stuffed dog, state \"I like this little dog, he's looking up.\"   When asked how she is doing responds, \"I'm very direct.\"        By chart review, patient was living independently in [place], but family noticed cognitive decline with weight loss and moved her to [place] in August 2019, to an AL in [place].   She did not do well there, and moved to Memory Care for additional support.  She eventually settled after a couple of elopement attempts and concerns about staff coming into her apartment for med administration.    Pt was seen by Cardiology for palpitations, with Ziopatch revealing >800 short runs of SVT and one 5 beat run of VT.    She was started on metoprolol.  She was also noted to have mild cardiomyopathy of unclear etiology, EF 46%, mild -mod TR and mild pulmonary HTN  In April 2021 pt signed on to Hospice with dx of protein-calorie malnutrition, delusional disorder and dementia.   She was recertified in November 2021, then \"graduated\" in June 2022     Pt was seen in the ED in December 2022 following a fall with facial trauma and nose bleed.   Also had a fall last month.      Past Medical History:   Diagnosis Date     Cardiomyopathy (H)     mild, EF 46%      Essential hypertension      Hypothyroidism 8/30/2019     Irregular heart beat 8/30/2019     NSVT (nonsustained ventricular tachycardia) (H)      Vitamin D deficiency 8/30/2019   Breast cancer, s/p Right mastectomy    SH:  , lives AL Memory Care unit.  Moved to [place] from [place]   She has a son Frank, daughter-in-law Lisa and 2 grandchildren that live in [place].     Her daughter Sherrell and a " "granddaughter live in [place]    She also has a sister Kajal.   Non smoker    ROS:  SLUMS 18/30   >>>>now 5/30    Hearing loss  Weight in 2020 was 101 lbs >>>in 2021 was 93 lbs  Wt Readings from Last 5 Encounters:   01/24/23 55.3 kg (122 lb)   12/19/22 56.2 kg (124 lb)   11/21/22 48.5 kg (107 lb)   07/10/22 55.3 kg (122 lb)   05/23/22 48.1 kg (106 lb)      EXAM:  NAD  BP (!) 137/104   Pulse 77   Temp (!) 96.7  F (35.9  C)   Resp 20   Ht 1.575 m (5' 2\")   Wt 55.3 kg (122 lb)   SpO2 91%   BMI 22.31 kg/m     Neck supple without adenopathy  S/p right mastectomy  Lungs with decreased BS, no rales or wheeze  Heart irreg irreg s1s2  Abd soft, NT, no distention or guarding, +BS  Ext without edema  Neuro: ambulatory without device, disoriented, +STML, confabulatory  Psych: affect okay, cheerful    Last Comprehensive Metabolic Panel:  Sodium   Date Value Ref Range Status   11/22/2022 143 136 - 145 mmol/L Final     Potassium   Date Value Ref Range Status   11/22/2022 4.0 3.4 - 5.3 mmol/L Final     Carbon Dioxide (CO2)   Date Value Ref Range Status   11/22/2022 20 (L) 22 - 29 mmol/L Final     Glucose   Date Value Ref Range Status   11/22/2022 114 (H) 70 - 99 mg/dL Final     Urea Nitrogen   Date Value Ref Range Status   11/22/2022 22.2 8.0 - 23.0 mg/dL Final     Creatinine   Date Value Ref Range Status   11/22/2022 1.00 (H) 0.51 - 0.95 mg/dL Final     GFR Estimate   Date Value Ref Range Status   11/22/2022 54 (L) >60 mL/min/1.73m2 Final     Calcium   Date Value Ref Range Status   11/22/2022 9.7 8.8 - 10.2 mg/dL Final     Lab Results   Component Value Date    WBC 6.6 11/22/2022      HGB 13.9 11/22/2022      MCV 98 11/22/2022       11/22/2022      CT SCAN OF THE HEAD WITHOUT CONTRAST December 12, 2022   HISTORY: Fall, trauma.  FINDINGS: No evidence of hemorrhage. Volume loss and confluent white  matter hypoattenuation which likely represents advanced chronic small  vessel ischemic change. Probable nasal bone " fracture, age  indeterminate. Small metallic foreign body within the left orbit along  the anterior lens. Changes of bilateral cataract surgery.                                                             IMPRESSION:   1. No acute intracranial abnormality.  2. Nasal bone fracture, age indeterminate.  3. Small metallic foreign body within the left orbit.    9/27/19 Lexiscan     Impression:  1.  Myocardial perfusion imaging using single isotope technique  demonstrated normal tracer uptake at rest and stress without evidence for ischemia or infarction.   2.  Gated images demonstrated mild global hypokinesis.  The left  ventricular systolic function is mildly reduced ejection fraction of 46%.      IMP/PLAN:   (I42.9) Cardiomyopathy, unspecified type (H)  (I10) Benign essential hypertension   (N18.31) Chronic kidney disease, stage 3a (H)  Comment: GFR 54   No CV symptoms   BP Readings from Last 3 Encounters:   01/24/23 (!) 137/104   12/19/22 (!) 148/100   12/12/22 (!) 148/100      Pulse Readings from Last 4 Encounters:   01/24/23 77   12/19/22 71   12/12/22 71   11/21/22 117      Plan: Remains on metoprolol ER 25 mg/day which she also needs for VR control.  With diastolic HTN could consider increasing dose to 50 mg/day   Furosemide 20 mg/day   Follow BPs and BMP      (E44.0) Moderate protein-calorie malnutrition (H)  Comment: has regained >20 lbs   Plan: preferences, assist with meals       (E03.9) Hypothyroidism, unspecified type  Comment:   TSH   Date Value Ref Range Status   11/22/2022 2.70 0.30 - 4.20 uIU/mL Final   06/09/2021 2.66 0.30 - 5.00 uIU/mL Final   03/16/2021 0.97 0.30 - 5.00 uIU/mL Final      Plan: levothyroxine 75 mcg/day; yearly TSH     (I47.1) SVT (supraventricular tachycardia) (H)  Comment: >800 runs on Ziopatch reading   Plan: metoprolol ER 25 mg/day       (G30.1,  F02.81) Late onset Alzheimer's dementia with behavioral disturbance (H)  Comment: confusion and disorientation, low functional status with  preserved language and mobility  Plan: AL Memory Care unit for assist with meds, meals, activity; secure unit for safety    (F22) Paranoia (H)  Comment: and delusional thinking, sometimes interferes with cares   Plan: risperidone 0.5 mg/HS and prn     Advanced directive: pt is DNR/DNI per signed POLST.  Consider re-evaluation by Hospice if she has a downturn.       Alyssa Sandra MD         Sincerely,        Alyssa Sandra MD

## 2023-02-08 PROBLEM — I47.10 SVT (SUPRAVENTRICULAR TACHYCARDIA) (H): Status: ACTIVE | Noted: 2023-01-01

## 2023-02-08 PROBLEM — F22 PARANOIA (H): Status: ACTIVE | Noted: 2023-01-01

## 2023-02-27 NOTE — LETTER
"    2/27/2023        RE: Frances Alexander  C/o Frank Alexander  [address]  [facility] [address]        [facility] GERIATR[facility]    Chief Complaint   Patient presents with     RECHECK     HPI:  Frances Alexander is a 87 year old  (1935), who is being seen today for an episodic care visit at: [facility] ([facility]) [[id number]].   She has lived in Memory Care at this facility since 10/2019. Medical history significant for dementia, HTN, cardiomyopathy, SVT, remote breast cancer, hypothyroidism.   She was receiving care with [facility] and discharged 6/8/2022 due to stability.   She tested positive for COVID-19 on 7/25/2022 and had a mild cough with no other symptoms.   She was sent to the ED 12/12/2022 after an unwitnessed fall with facial trauma and epistaxis. CT head showed a nasal bone fracture, age indeterminate, small metallic foreign body in the left orbit along the anterior lens,  no acute intracranial pathology. She had no evidence of orbital trauma . CT cervical spine showed a focal irregularity of C5 without prevertebral edema, no evidence of acute fracture. She returned to the facility. Tylenol was scheduled for pain.       Today's concern is:      Late onset Alzheimer's dementia with behavioral disturbance (H)  Paranoia (H)  Protein-calorie malnutrition, unspecified severity (H)  Weight loss  Chronic kidney disease, stage 3a (H)  Cardiomyopathy, unspecified type (H)  Essential hypertension  Closed fracture of nasal bone with routine healing, subsequent encounter  Falls frequently  She is seen today to follow up on progressive dementia and weight loss. Functional status has declined in recent weeks. She's had frequent falls (2 in the past week) and now requires assistance with feeding. Weight is down 8-10 lbs to 100 lbs. She is unable to provide history. Smiles and says a few words.She's looking at pictures of puppies and kittens, \"aren't they tom.\" Ambulates " without a device. Requires assist of 1 with cares. Can be anxious and resistive with cares.     Allergies, and PMH/PSH reviewed in EPIC today    REVIEW OF SYSTEMS:  Unable to obtain due to dementia. Positives as noted under HPI.       Objective:   BP (!) 145/74   Pulse 88   Temp 98.4  F (36.9  C)   Resp 20   Wt 45.6 kg (100 lb 9.6 oz)   BMI 18.40 kg/m    GENERAL APPEARANCE:  Alert, in no distress, thin, frail appearing   ENT:  [place], oropharynx clear  EYES:  conjunctiva and lids normal  NECK: no adenopathy or thyromegaly   RESP:  lungs clear to auscultation  CV:  regular rate and rhythm,occasional ectopy,  no murmur, trace edema both LE  ABDOMEN:  soft, non-tender, no distension, no masses  M/S: up in chair.  VALENTINO with good strength. No joint inflammation  SKIN:  no visible rashes or open areas  PSYCH:  oriented to self, insight and judgement impaired, memory impaired , affect and mood normal    Recent labs in [facility] reviewed by me today.     ASSESSMENT / PLAN:  (G30.1,  F02.818) Late onset Alzheimer's dementia with behavioral disturbance (H)  (primary encounter diagnosis)  (F22) Paranoia (H)  Comment: progressive disease with declining functional status, frequent falls and weight loss.   Discussed at length with her son Frank Alexander, who confirms comfort focused goals of care, no  hospitalizations.   Plan: continue risperidone. Refer to [facility].   Discussed with staff.     (E46) Protein-calorie malnutrition, unspecified severity (H)  (R63.4) Weight loss  Comment: current weight 100 lbs. She had a weight as high as 122 in 12/2022, usual weights 108-110 lbs. Having more difficulty feeding herself and is assisted by staff   Plan: encourage intake. Staff assistance at meals. Hospice referral.     (N18.31) Chronic kidney disease, stage 3a (H)  Comment: creatinine 0.84 on 2/21/2023, baseline   Plan: avoid nephrotoxins. Renal dose meds     (I42.9) Cardiomyopathy, unspecified type (H)  (I10) Essential  hypertension  Comment: acceptable control with recent /74, 113/88  Plan: continue lasix, metoprolol. Avoid hypotension due to advanced age and fall risk     (S02.2XXD) Closed fracture of nasal bone with routine healing, subsequent encounter  Comment: appears comfortable,   Plan: tylenol     (R29.6) Falls frequently  Comment: gait instability due to progressive dementia   Plan: staff assistance with cares, supervision with mobility      Addendum: nurse called late in the day reporting patient fell and has right hip pain and difficulty standing. XR ordered.             Electronically signed by: MARTY Uribe CNP             Sincerely,        MARTY Uribe CNP

## 2023-02-28 NOTE — TELEPHONE ENCOUNTER
"Ortho Nursing home visit    Frances Alexander is a 87 year old female who resides at [facility] unit;    Patient had x-rays taken on-site that were read by the radiologist, showing, Sub/capital  Right femoral neck fracture;      Past Medical History:   Diagnosis Date     Abnormal electrocardiogram 08/28/2019     Abnormal weight loss 10/24/2019     Breast cancer (H) 10/27/2019     Cardiomyopathy (H)     mild, EF 46%      Chronic systolic heart failure (H) 10/27/2019     Dementia (H) 10/27/2019     Essential hypertension 08/28/2019     Glaucoma 08/28/2019     Hypothyroidism 8/30/2019     Irregular heart beat 8/30/2019     Left upper quadrant abdominal swelling, mass and lump 08/28/2019     Moderate protein-calorie malnutrition (H) 08/28/2019     NSVT (nonsustained ventricular tachycardia) 10/27/2019     Other specified diseases of liver 10/27/2019     Vitamin D deficiency 8/30/2019      Past Surgical History:   Procedure Laterality Date     MASTECTOMY Right         Allergies   Allergen Reactions     No Known Allergies       There were no vitals taken for this visit.    ASSESSMENT / PLAN : If patient is not a surgical candidate for dia-arthroplasty. Would rec; hospice or palliative care;     Position of comfort would be \" beachchair \" position with head of bead elevated 30 degrees. A pillow under the right knee to flex the hip slightly. If turning the patient please use a pillow between the legs, li or 2 person asst for transfers,     I can be reached at [phone] for any questions;    [id number]          Olesya OPA-C  [phone] Cell    "

## 2023-02-28 NOTE — TELEPHONE ENCOUNTER
Patient fell late yesterday and had right hip pain and was unable to ambulate. XR results are back today and show an acute nondisplaced subcapital right femur fracture.   Results reviewed with her son Frank Alexander, who wishes to proceed with comfort care. He completed enrollment paperwork with [facility] earlier today.   Consulted with Eric LAWRENCE for management-please see his note.   Also consulted with Dr Alyssa Sandra  Discussed with facility staff and hospice . Pain meds will be ordered by hospice. AL nurse reports patient has been comfortable in a wheelchair.       Anders Mckee, MARTY CNP on 2/28/2023 at 6:01 PM

## 2023-03-03 NOTE — LETTER
3/3/2023        RE: Frances Alexander  C/o Frank Alexander  [address]  [facility] [address]        [facility] GERIATRICS    Chief Complaint   Patient presents with     RECHECK     HPI:  Frances Alexander is a 87 year old  (1935), who is being seen today for an episodic care visit at: [facility] ([facility]) [[id number]]. Today's concern is: ***    Allergies, and PMH/PSH reviewed in [facility] today.  REVIEW OF SYSTEMS:  {vmvzmh14:108139}    Objective:   /82   Pulse 94   Temp 98.3  F (36.8  C)   Resp 16   Wt 46.3 kg (102 lb)   BMI 18.66 kg/m    {Nursing home physical exam :480239}    {fgslab:478119}    Assessment/Plan:  {S DX2:327465}    MED REC REQUIRED{TIP  Click the link below to document or use med rec list, use list to pull in response :154545}  Post Medication Reconciliation Status: {MED REC LIST:986983}      Orders:  {fgsorders:148410}  ***    Electronically signed by: Yanet Soto ***            Sincerely,        MARTY Uribe CNP

## 2023-03-03 NOTE — PROGRESS NOTES
[facility] GERIATRICS    Chief Complaint   Patient presents with     RECHECK     HPI:  Frances Alexander is a 87 year old  (1935), who is being [facility] today [facility][id number]].   She has lived in Memory Care at this facility since 10/2019. Medical history significant for dementia, HTN, cardiomyopathy, SVT, remote breast cancer, hypothyroidism.   She was receiving care with [facility] and discharged 6/8/2022 due to stability.   She tested positive for COVID-19 on 7/25/2022 and had a mild cough with no other symptoms.   She was sent to the ED 12/12/2022 after an unwitnessed fall with facial trauma and epistaxis. CT head showed a nasal bone fracture, age indeterminate, small metallic foreign body in the left orbit along the anterior lens,  no acute intracranial pathology. She had no evidence of orbital trauma . CT cervical spine showed a focal irregularity of C5 without prevertebral edema, no evidence of acute fracture. She returned to the facility. Tylenol was scheduled for pain.     She had right hip pain and inability to ambulate after a fall 2/27/2023. XR showed an acute nondisplaced subcapital right femur fracture. Her son opted for comfort care and she enrolled with [facility] 2/28/2023.     Today's concern is:      Closed fracture of neck of right femur, initial encounter (H)  Late onset Alzheimer's dementia with behavioral disturbance (H)  Paranoia (H)  Protein-calorie malnutrition, unspecified severity (H)  Weight loss  Cardiomyopathy, unspecified type (H)  Lower extremity edema  Essential hypertension  She is unable to provide history. Smiles and speaks a few words. Does not respond to questions. Staff reports she appears comfortable. Wheelchair bound and  li is used for transfers. She attempted to get up on her own yesterday. Requires max assist with cares. Oral intake remains poor.     Allergies, and  PMH/PSH reviewed in EPIC today    REVIEW OF SYSTEMS:  Unable to obtain due to dementia. Positives as noted under HPI.       Objective:   /82   Pulse 94   Temp 98.3  F (36.8  C)   Resp 16   Wt 46.3 kg (102 lb)   BMI 18.66 kg/m    GENERAL APPEARANCE:  Alert, in no distress,  frail appearing   ENT:  [place], oropharynx clear  EYES:  conjunctiva and lids normal  NECK: no adenopathy or thyromegaly   RESP:  lungs clear to auscultation  CV:  regular rate and rhythm,occasional ectopy,  no murmur, trace edema both LE, positive pedal pulses   ABDOMEN:  soft, non-tender, no distension, no masses  M/S: wheelchair.  Weakness RLE. No joint inflammation   SKIN:  no rashes or open areas  PSYCH:  oriented to self, insight and judgement impaired, memory impaired , affect and mood normal    Recent labs in [facility] reviewed by me today.     ASSESSMENT / PLAN:  (S72.001A) Closed fracture of neck of right femur, initial encounter (H)  (primary encounter diagnosis)  Comment: acute fracture after a fall. Pain appears controlled   Plan: tylenol, morphine. Comfort meds and care with [facility]     (G30.1,  F02.818) Late onset Alzheimer's dementia with behavioral disturbance (H)  (F22) Paranoia (H)  Comment: advanced disease. Status was declining prior to the fall 2/27/2023  and a referral had been made to hospice earlier that day.   Plan:continue risperidone. Memory Care staff assistance with cares, meals, activities, med admin.     (E46) Protein-calorie malnutrition, unspecified severity (H)  (R63.4) Weight loss  Comment: weight is down 8-10 lbs   Plan: encourage intake, staff assistance during meals     (I42.9) Cardiomyopathy, unspecified type (H)  (R60.0) Lower extremity edema  (I10) Essential hypertension  Comment: no chest pain or acute symptoms. Chronic LE edema unchanged.   Plan: continue lasix, metoprolol. Monitor VS and adjust meds as indicated.         Electronically signed by: MARTY Uribe CNP

## 2023-03-27 NOTE — PROGRESS NOTES
[facility] GERIATRICS    Chief Complaint   Patient presents with     RECHECK     HPI:  Frances Alexander is a 87 year old  (1935), who is being [facility] today [facility] [[id number]].   She has lived in Memory Care at this facility since 10/2019. Medical history significant for dementia, HTN, cardiomyopathy, SVT, remote breast cancer, hypothyroidism.   She was receiving care with [facility] and discharged 6/8/2022 due to stability.   She tested positive for COVID-19 on 7/25/2022 and had a mild cough with no other symptoms.   She was sent to the ED 12/12/2022 after an unwitnessed fall with facial trauma and epistaxis. CT head showed a nasal bone fracture, age indeterminate, small metallic foreign body in the left orbit along the anterior lens,  no acute intracranial pathology. She had no evidence of orbital trauma . CT cervical spine showed a focal irregularity of C5 without prevertebral edema, no evidence of acute fracture. She returned to the facility. Tylenol was scheduled for pain.      She had right hip pain and inability to ambulate after a fall 2/27/2023. XR showed an acute nondisplaced subcapital right femur fracture. Her son opted for comfort care and she enrolled with [facility] 2/28/2023.     Today's concern is:      Closed fracture of neck of right femur with routine healing, subsequent encounter  Hospice care patient  Late onset Alzheimer's dementia with behavioral disturbance (H)  Paranoia (H)  Protein-calorie malnutrition, unspecified severity (H)  Weight loss  Cardiomyopathy, unspecified type (H)  Essential hypertension   She is seen today after staff reported a decline in status. Oral intake is very poor and she was unable to swallow most meds this morning. She's sitting up in a Broda chair. She's awake, but does not make eye contact or interact.       Allergies, and PMH/PSH reviewed in EPIC  today    REVIEW OF SYSTEMS:  Unable to obtain due to dementia. Positives as noted under HPI.       Objective:   BP (!) 146/95   Pulse 72   Temp 98  F (36.7  C)   Resp 16   GENERAL APPEARANCE:  Alert, in no distress, very thin, appears pale   ENT:  [place], oropharynx dry   EYES:  conjunctiva and lids normal  NECK: no adenopathy or thyromegaly   RESP:  lungs diminished bilaterally, no crackles   CV:  regular rate, frequent ectopy,  no murmur, trace edema both LE  ABDOMEN:  soft, non-tender, no distension, no masses  M/S: Broda chair. Generalized weakness of extremities. No joint inflammation   SKIN:  no rashes or open areas  PSYCH:  oriented to self, insight and judgement impaired, memory impaired , affect and mood normal        ASSESSMENT / PLAN:  (S72.001D) Closed fracture of neck of right femur with routine healing, subsequent encounter  (primary encounter diagnosis)  (Z51.5) Hospice care patient  Comment: appears to be in the early stages of transitioning. Unable to swallow pills this am. Pain appears controlled   Plan: discontinue all meds except comfort meds. Comfort care and meds with [facility].   Met with the hospice nurse, who has spoken with patient's son today.   Discussed with staff.     (G30.1,  F02.818) Late onset Alzheimer's dementia with behavioral disturbance (H)  (F22) Paranoia (H)  Comment: status was slowly declining prior to the hip fracture   Plan: as above. Memory Care staff assistance with cares, meals, activities, med admin    (E46) Protein-calorie malnutrition, unspecified severity (H)  (R63.4) Weight loss  Comment: significant weight loss since the hip fracture  Plan: oral intake as tolerated     (I42.9) Cardiomyopathy, unspecified type (H)  Comment: no chest pain or acute symptoms   Plan: comfort care     (I10) Essential hypertension  Comment: acceptable control  Plan: discontinue meds as above         Electronically signed by: MARTY Uribe CNP

## 2023-03-27 NOTE — LETTER
3/27/2023        RE: Frances Alexander  C/o Frank Alexander  [address]  [facility] [address]        [facility] GERIATRICS    Chief Complaint   Patient presents with     RECHECK     HPI:  Frances Alexander is a 87 year old  (1935), who is being seen today for an episodic care visit at: [facility] [place] [facility] [place] ([facility]) [[id number]]. Today's concern is: ***    Allergies, and PMH/PSH reviewed in [facility] today.  REVIEW OF SYSTEMS:  {wcocga70:754887}    Objective:   /82   Pulse 64   Temp 98  F (36.7  C)   Resp 16   Wt 46.3 kg (102 lb)   BMI 18.66 kg/m    {Nursing home physical exam :406653}    {fgslab:070998}    Assessment/Plan:  {[facility] DX2:606605}    MED REC REQUIRED{TIP  Click the link below to document or use med rec list, use list to pull in response :525392}  Post Medication Reconciliation Status: {MED REC LIST:138771}      Orders:  {fgsorders:434250}  ***    Electronically signed by: Yanet Soto ***            Sincerely,        MARTY Uribe CNP

## 2023-04-04 VITALS
SYSTOLIC BLOOD PRESSURE: 146 MMHG | RESPIRATION RATE: 16 BRPM | DIASTOLIC BLOOD PRESSURE: 95 MMHG | HEART RATE: 72 BPM | TEMPERATURE: 98 F

## 2025-03-07 NOTE — PROGRESS NOTES
"[facility] GERIATRICS    Chief Complaint   Patient presents with     RECHECK     HPI:  Frances Alexander is a 87 year old  (1935), who is being [facility] today [facility] [[id number]].   She has lived in Memory Care at this facility since 10/2019. Medical history significant for dementia, HTN, cardiomyopathy, SVT, remote breast cancer, hypothyroidism.   She was receiving care with [facility] and discharged 6/8/2022 due to stability.   She tested positive for COVID-19 on 7/25/2022 and had a mild cough with no other symptoms.   She was sent to the ED 12/12/2022 after an unwitnessed fall with facial trauma and epistaxis. CT head showed a nasal bone fracture, age indeterminate, small metallic foreign body in the left orbit along the anterior lens,  no acute intracranial pathology. She had no evidence of orbital trauma . CT cervical spine showed a focal irregularity of C5 without prevertebral edema, no evidence of acute fracture. She returned to the facility. Tylenol was scheduled for pain.       Today's concern is:      Late onset Alzheimer's dementia with behavioral disturbance (H)  Paranoia (H)  Protein-calorie malnutrition, unspecified severity (H)  Weight loss  Chronic kidney disease, stage 3a (H)  Cardiomyopathy, unspecified type (H)  Essential hypertension  Closed fracture of nasal bone with routine healing, subsequent encounter  Falls frequently  She is seen today to follow up on progressive dementia and weight loss. Functional status has declined in recent weeks. She's had frequent falls (2 in the past week) and now requires assistance with feeding. Weight is down 8-10 lbs to 100 lbs. She is unable to provide history. Smiles and says a few words.She's looking at pictures of puppies and kittens, \"aren't they tom.\" Ambulates without a device. Requires assist of 1 with cares. Can be anxious and resistive with cares. "     Allergies, and PMH/PSH reviewed in EPIC today    REVIEW OF SYSTEMS:  Unable to obtain due to dementia. Positives as noted under HPI.       Objective:   BP (!) 145/74   Pulse 88   Temp 98.4  F (36.9  C)   Resp 20   Wt 45.6 kg (100 lb 9.6 oz)   BMI 18.40 kg/m    GENERAL APPEARANCE:  Alert, in no distress, thin, frail appearing   ENT:  [place], oropharynx clear  EYES:  conjunctiva and lids normal  NECK: no adenopathy or thyromegaly   RESP:  lungs clear to auscultation  CV:  regular rate and rhythm,occasional ectopy,  no murmur, trace edema both LE  ABDOMEN:  soft, non-tender, no distension, no masses  M/S: up in chair.  [place] with good strength. No joint inflammation  SKIN:  no visible rashes or open areas  PSYCH:  oriented to self, insight and judgement impaired, memory impaired , affect and mood normal    Recent labs in [facility] reviewed by me today.     ASSESSMENT / PLAN:  (G30.1,  F02.818) Late onset Alzheimer's dementia with behavioral disturbance (H)  (primary encounter diagnosis)  (F22) Paranoia (H)  Comment: progressive disease with declining functional status, frequent falls and weight loss.   Discussed at length with her son Frank Alexander, who confirms comfort focused goals of care, no  hospitalizations.   Plan: continue risperidone. Refer to [facility].   Discussed with staff.     (E46) Protein-calorie malnutrition, unspecified severity (H)  (R63.4) Weight loss  Comment: current weight 100 lbs. She had a weight as high as 122 in 12/2022, usual weights 108-110 lbs. Having more difficulty feeding herself and is assisted by staff   Plan: encourage intake. Staff assistance at meals. Hospice referral.     (N18.31) Chronic kidney disease, stage 3a (H)  Comment: creatinine 0.84 on 2/21/2023, baseline   Plan: avoid nephrotoxins. Renal dose meds     (I42.9) Cardiomyopathy, unspecified type (H)  (I10) Essential hypertension  Comment: acceptable control with recent /74, 113/88  Plan: continue lasix,  metoprolol. Avoid hypotension due to advanced age and fall risk     (S02.2XXD) Closed fracture of nasal bone with routine healing, subsequent encounter  Comment: appears comfortable,   Plan: tylenol     (R29.6) Falls frequently  Comment: gait instability due to progressive dementia   Plan: staff assistance with cares, supervision with mobility      Addendum: nurse called late in the day reporting patient fell and has right hip pain and difficulty standing. XR ordered.             Electronically signed by: MARTY Uribe CNP        Yes

## (undated) RX ORDER — REGADENOSON 0.08 MG/ML
INJECTION, SOLUTION INTRAVENOUS
Status: DISPENSED
Start: 2019-09-27